# Patient Record
Sex: MALE | Race: BLACK OR AFRICAN AMERICAN | NOT HISPANIC OR LATINO | Employment: FULL TIME | URBAN - METROPOLITAN AREA
[De-identification: names, ages, dates, MRNs, and addresses within clinical notes are randomized per-mention and may not be internally consistent; named-entity substitution may affect disease eponyms.]

---

## 2019-07-28 ENCOUNTER — HOSPITAL ENCOUNTER (EMERGENCY)
Facility: HOSPITAL | Age: 24
Discharge: HOME | End: 2019-07-29
Attending: EMERGENCY MEDICINE

## 2019-07-28 DIAGNOSIS — J36 PERITONSILLAR ABSCESS: Primary | ICD-10-CM

## 2019-07-28 LAB
ALBUMIN SERPL BCP-MCNC: 4.5 G/DL (ref 3.5–5.2)
ALP SERPL-CCNC: 81 U/L (ref 55–135)
ALT SERPL W/O P-5'-P-CCNC: 24 U/L (ref 10–44)
ANION GAP SERPL CALC-SCNC: 12 MMOL/L (ref 8–16)
AST SERPL-CCNC: 14 U/L (ref 10–40)
BASOPHILS # BLD AUTO: 0.02 K/UL (ref 0–0.2)
BASOPHILS NFR BLD: 0.1 % (ref 0–1.9)
BILIRUB SERPL-MCNC: 1.1 MG/DL (ref 0.1–1)
BUN SERPL-MCNC: 10 MG/DL (ref 6–20)
CALCIUM SERPL-MCNC: 10.1 MG/DL (ref 8.7–10.5)
CHLORIDE SERPL-SCNC: 101 MMOL/L (ref 95–110)
CO2 SERPL-SCNC: 25 MMOL/L (ref 23–29)
CREAT SERPL-MCNC: 0.9 MG/DL (ref 0.5–1.4)
DEPRECATED S PYO AG THROAT QL EIA: NEGATIVE
DIFFERENTIAL METHOD: ABNORMAL
EOSINOPHIL # BLD AUTO: 0 K/UL (ref 0–0.5)
EOSINOPHIL NFR BLD: 0.2 % (ref 0–8)
ERYTHROCYTE [DISTWIDTH] IN BLOOD BY AUTOMATED COUNT: 13.4 % (ref 11.5–14.5)
EST. GFR  (AFRICAN AMERICAN): >60 ML/MIN/1.73 M^2
EST. GFR  (NON AFRICAN AMERICAN): >60 ML/MIN/1.73 M^2
GLUCOSE SERPL-MCNC: 114 MG/DL (ref 70–110)
HCT VFR BLD AUTO: 45 % (ref 40–54)
HETEROPH AB SERPL QL IA: NEGATIVE
HGB BLD-MCNC: 15 G/DL (ref 14–18)
HIV 1+2 AB+HIV1 P24 AG SERPL QL IA: NEGATIVE
LYMPHOCYTES # BLD AUTO: 2.3 K/UL (ref 1–4.8)
LYMPHOCYTES NFR BLD: 10.2 % (ref 18–48)
MCH RBC QN AUTO: 27.1 PG (ref 27–31)
MCHC RBC AUTO-ENTMCNC: 33.3 G/DL (ref 32–36)
MCV RBC AUTO: 81 FL (ref 82–98)
MONOCYTES # BLD AUTO: 2.4 K/UL (ref 0.3–1)
MONOCYTES NFR BLD: 10.7 % (ref 4–15)
NEUTROPHILS # BLD AUTO: 17.7 K/UL (ref 1.8–7.7)
NEUTROPHILS NFR BLD: 79.2 % (ref 38–73)
PLATELET # BLD AUTO: 285 K/UL (ref 150–350)
PMV BLD AUTO: 8.8 FL (ref 9.2–12.9)
POTASSIUM SERPL-SCNC: 3.6 MMOL/L (ref 3.5–5.1)
PROT SERPL-MCNC: 8.9 G/DL (ref 6–8.4)
RBC # BLD AUTO: 5.54 M/UL (ref 4.6–6.2)
SODIUM SERPL-SCNC: 138 MMOL/L (ref 136–145)
WBC # BLD AUTO: 22.45 K/UL (ref 3.9–12.7)

## 2019-07-28 PROCEDURE — 25000003 PHARM REV CODE 250: Performed by: EMERGENCY MEDICINE

## 2019-07-28 PROCEDURE — 80053 COMPREHEN METABOLIC PANEL: CPT

## 2019-07-28 PROCEDURE — 36415 COLL VENOUS BLD VENIPUNCTURE: CPT

## 2019-07-28 PROCEDURE — 42700 I&D ABSCESS PERITONSILLAR: CPT

## 2019-07-28 PROCEDURE — 87081 CULTURE SCREEN ONLY: CPT

## 2019-07-28 PROCEDURE — 96374 THER/PROPH/DIAG INJ IV PUSH: CPT | Mod: 59

## 2019-07-28 PROCEDURE — 25500020 PHARM REV CODE 255: Performed by: REGISTERED NURSE

## 2019-07-28 PROCEDURE — 96361 HYDRATE IV INFUSION ADD-ON: CPT | Mod: 59

## 2019-07-28 PROCEDURE — 99285 EMERGENCY DEPT VISIT HI MDM: CPT | Mod: 25

## 2019-07-28 PROCEDURE — 63600175 PHARM REV CODE 636 W HCPCS: Performed by: REGISTERED NURSE

## 2019-07-28 PROCEDURE — 86703 HIV-1/HIV-2 1 RESULT ANTBDY: CPT

## 2019-07-28 PROCEDURE — 87880 STREP A ASSAY W/OPTIC: CPT

## 2019-07-28 PROCEDURE — 86308 HETEROPHILE ANTIBODY SCREEN: CPT

## 2019-07-28 PROCEDURE — 85025 COMPLETE CBC W/AUTO DIFF WBC: CPT

## 2019-07-28 RX ORDER — DEXAMETHASONE SODIUM PHOSPHATE 4 MG/ML
8 INJECTION, SOLUTION INTRA-ARTICULAR; INTRALESIONAL; INTRAMUSCULAR; INTRAVENOUS; SOFT TISSUE
Status: COMPLETED | OUTPATIENT
Start: 2019-07-28 | End: 2019-07-28

## 2019-07-28 RX ORDER — ACETAMINOPHEN 500 MG
1000 TABLET ORAL
Status: COMPLETED | OUTPATIENT
Start: 2019-07-28 | End: 2019-07-28

## 2019-07-28 RX ORDER — LIDOCAINE HYDROCHLORIDE 10 MG/ML
10 INJECTION, SOLUTION EPIDURAL; INFILTRATION; INTRACAUDAL; PERINEURAL
Status: COMPLETED | OUTPATIENT
Start: 2019-07-28 | End: 2019-07-28

## 2019-07-28 RX ORDER — DEXAMETHASONE SODIUM PHOSPHATE 4 MG/ML
8 INJECTION, SOLUTION INTRA-ARTICULAR; INTRALESIONAL; INTRAMUSCULAR; INTRAVENOUS; SOFT TISSUE
Status: DISCONTINUED | OUTPATIENT
Start: 2019-07-28 | End: 2019-07-28

## 2019-07-28 RX ORDER — AMOXICILLIN AND CLAVULANATE POTASSIUM 875; 125 MG/1; MG/1
1 TABLET, FILM COATED ORAL EVERY 12 HOURS
Qty: 28 TABLET | Refills: 0 | Status: SHIPPED | OUTPATIENT
Start: 2019-07-28 | End: 2019-08-11

## 2019-07-28 RX ORDER — AMOXICILLIN AND CLAVULANATE POTASSIUM 875; 125 MG/1; MG/1
1 TABLET, FILM COATED ORAL
Status: COMPLETED | OUTPATIENT
Start: 2019-07-28 | End: 2019-07-28

## 2019-07-28 RX ADMIN — IOHEXOL 75 ML: 350 INJECTION, SOLUTION INTRAVENOUS at 09:07

## 2019-07-28 RX ADMIN — SODIUM CHLORIDE 1000 ML: 0.9 INJECTION, SOLUTION INTRAVENOUS at 08:07

## 2019-07-28 RX ADMIN — LIDOCAINE HYDROCHLORIDE 100 MG: 10 INJECTION, SOLUTION EPIDURAL; INFILTRATION; INTRACAUDAL; PERINEURAL at 11:07

## 2019-07-28 RX ADMIN — DEXAMETHASONE SODIUM PHOSPHATE 8 MG: 4 INJECTION, SOLUTION INTRA-ARTICULAR; INTRALESIONAL; INTRAMUSCULAR; INTRAVENOUS; SOFT TISSUE at 08:07

## 2019-07-28 RX ADMIN — ACETAMINOPHEN 1000 MG: 500 TABLET ORAL at 11:07

## 2019-07-28 RX ADMIN — AMOXICILLIN AND CLAVULANATE POTASSIUM 1 TABLET: 875; 125 TABLET, FILM COATED ORAL at 11:07

## 2019-07-29 VITALS
RESPIRATION RATE: 19 BRPM | DIASTOLIC BLOOD PRESSURE: 77 MMHG | WEIGHT: 175.5 LBS | BODY MASS INDEX: 29.24 KG/M2 | HEIGHT: 65 IN | TEMPERATURE: 98 F | SYSTOLIC BLOOD PRESSURE: 145 MMHG | HEART RATE: 84 BPM | OXYGEN SATURATION: 99 %

## 2019-07-29 NOTE — ED PROVIDER NOTES
SCRIBE #1 NOTE: IGus Zee, am scribing for, and in the presence of, LJ Hager Jr. . I have scribed the HPI, ROS, and PEx.    SCRIBE #2 NOTE: I, Chris Ramírez/Naya Cesar, am scribing for, and in the presence of,  Arnav Mccarthy MD. I have scribed the remaining portions of the note not scribed by Scribe #1.      History     Chief Complaint   Patient presents with    Sore Throat     states sore throat for a week with difficulty swallowing past 4 days. Denies fever. Able to swallow with difficulty, no airway distress noted.      Review of patient's allergies indicates:  No Known Allergies      History of Present Illness           7/28/2019, 7:48 PM  History obtained from the patient      History of Present Illness: Wilfred Beach is a 23 y.o. male patient who presents to the Emergency Department for evaluation of sore throat which onset gradually last weekend. Pt states his sxs had improved during the week but recently worsened. Symptoms are worsening and moderate in severity. No mitigating or exacerbating factors reported. Associated sxs include difficulty swallowing and hoarseness. Patient denies any fever, chills, rhinorrhea, congestion, ear pain, and all other sxs at this time. No further complaints or concerns at this time.         Arrival mode: Personal vehicle     PCP: Primary Doctor No        Past Medical History:  History reviewed. No pertinent past medical history.    Past Surgical History:  History reviewed. No pertinent surgical history.      Family History:  History reviewed. No pertinent family history.    Social History:  Social History     Tobacco Use    Smoking status: Current Some Day Smoker     Types: Cigarettes    Smokeless tobacco: Never Used   Substance and Sexual Activity    Alcohol use: Never     Frequency: Never    Drug use: Never    Sexual activity: Unknown        Review of Systems     Review of Systems   Constitutional: Negative for fever.   HENT:  Positive for sore throat, trouble swallowing and voice change. Negative for congestion, ear pain and rhinorrhea.    Respiratory: Negative for shortness of breath.    Cardiovascular: Negative for chest pain.   Gastrointestinal: Negative for nausea.   Genitourinary: Negative for dysuria.   Musculoskeletal: Negative for back pain.   Skin: Negative for rash.   Neurological: Negative for weakness.   Hematological: Does not bruise/bleed easily.   All other systems reviewed and are negative.       Physical Exam     Initial Vitals [07/28/19 1939]   BP Pulse Resp Temp SpO2   (!) 155/77 86 18 98.4 °F (36.9 °C) 95 %      MAP       --          Physical Exam  Nursing Notes and Vital Signs Reviewed.  Constitutional: Patient is in no apparent distress. Well-developed and well-nourished.  Head: Atraumatic. Normocephalic.  Eyes: PERRL. EOM intact. Conjunctivae are not pale. No scleral icterus.  Nose: Patent nares. Turbinates are normal. No drainage.   Throat: Moist mucous membranes. Posterior oropharynx is symmetric with erythema. +3 right tonsillar edema. +1 left tonsillar edema. Tonsillar exudate is not present. No trismus. Normal handling of secretions. No stridor..    Neck: Supple. Full ROM. No lymphadenopathy.  Cardiovascular: Regular rate. Regular rhythm. No murmurs, rubs, or gallops. Distal pulses are 2+ and symmetric.  Pulmonary/Chest: No respiratory distress. Clear to auscultation bilaterally. No wheezing or rales.  Abdominal: Soft and non-distended.  There is no tenderness.  No rebound, guarding, or rigidity. Good bowel sounds.  Genitourinary: No CVA tenderness  Musculoskeletal: Moves all extremities. No obvious deformities. No edema.  Skin: Warm and dry.  Neurological:  Alert, awake, and appropriate.  Normal speech.  No acute focal neurological deficits are appreciated.  Psychiatric: Normal affect. Good eye contact. Appropriate in content.     ED Course   I & D - Incision and Drainage  Date/Time: 7/28/2019 11:28  "PM  Performed by: Tre Mccarthy MD  Authorized by: Tre Mccarthy MD   Consent Done: Yes  Consent: Verbal consent obtained.  Risks and benefits: risks, benefits and alternatives were discussed  Consent given by: patient  Patient understanding: patient states understanding of the procedure being performed  Patient identity confirmed: name and verbally with patient  Type: abscess  Body area: mouth  Location details: peritonsillar  Anesthesia: local infiltration    Anesthesia:  Local Anesthetic: lidocaine 1% without epinephrine  Anesthetic total: 1 mL  Patient sedated: no  Scalpel size: 11  Incision type: single straight  Complexity: simple  Drainage: bloody and  purulent  Drainage amount: scant  Wound treatment: incision,  drainage and  expression of material  Packing material: none  Complications: No  Patient tolerance: Patient tolerated the procedure well with no immediate complications        ED Vital Signs:  Vitals:    07/28/19 1939   BP: (!) 155/77   Pulse: 86   Resp: 18   Temp: 98.4 °F (36.9 °C)   TempSrc: Oral   SpO2: 95%   Weight: 79.6 kg (175 lb 7.8 oz)   Height: 5' 5" (1.651 m)       Abnormal Lab Results:  Labs Reviewed   CBC W/ AUTO DIFFERENTIAL - Abnormal; Notable for the following components:       Result Value    WBC 22.45 (*)     Mean Corpuscular Volume 81 (*)     MPV 8.8 (*)     Gran # (ANC) 17.7 (*)     Mono # 2.4 (*)     Gran% 79.2 (*)     Lymph% 10.2 (*)     All other components within normal limits   COMPREHENSIVE METABOLIC PANEL - Abnormal; Notable for the following components:    Glucose 114 (*)     Total Protein 8.9 (*)     Total Bilirubin 1.1 (*)     All other components within normal limits   THROAT SCREEN, RAPID   CULTURE, STREP A,  THROAT   HIV 1 / 2 ANTIBODY   HETEROPHILE AB SCREEN        All Lab Results:  Results for orders placed or performed during the hospital encounter of 07/28/19   Rapid strep screen   Result Value Ref Range    Rapid Strep A Screen Negative Negative   HIV " 1/2 Ag/Ab (4th Gen)   Result Value Ref Range    HIV 1/2 Ag/Ab Negative Negative   CBC auto differential   Result Value Ref Range    WBC 22.45 (H) 3.90 - 12.70 K/uL    RBC 5.54 4.60 - 6.20 M/uL    Hemoglobin 15.0 14.0 - 18.0 g/dL    Hematocrit 45.0 40.0 - 54.0 %    Mean Corpuscular Volume 81 (L) 82 - 98 fL    Mean Corpuscular Hemoglobin 27.1 27.0 - 31.0 pg    Mean Corpuscular Hemoglobin Conc 33.3 32.0 - 36.0 g/dL    RDW 13.4 11.5 - 14.5 %    Platelets 285 150 - 350 K/uL    MPV 8.8 (L) 9.2 - 12.9 fL    Gran # (ANC) 17.7 (H) 1.8 - 7.7 K/uL    Lymph # 2.3 1.0 - 4.8 K/uL    Mono # 2.4 (H) 0.3 - 1.0 K/uL    Eos # 0.0 0.0 - 0.5 K/uL    Baso # 0.02 0.00 - 0.20 K/uL    Gran% 79.2 (H) 38.0 - 73.0 %    Lymph% 10.2 (L) 18.0 - 48.0 %    Mono% 10.7 4.0 - 15.0 %    Eosinophil% 0.2 0.0 - 8.0 %    Basophil% 0.1 0.0 - 1.9 %    Differential Method Automated    Comprehensive metabolic panel   Result Value Ref Range    Sodium 138 136 - 145 mmol/L    Potassium 3.6 3.5 - 5.1 mmol/L    Chloride 101 95 - 110 mmol/L    CO2 25 23 - 29 mmol/L    Glucose 114 (H) 70 - 110 mg/dL    BUN, Bld 10 6 - 20 mg/dL    Creatinine 0.9 0.5 - 1.4 mg/dL    Calcium 10.1 8.7 - 10.5 mg/dL    Total Protein 8.9 (H) 6.0 - 8.4 g/dL    Albumin 4.5 3.5 - 5.2 g/dL    Total Bilirubin 1.1 (H) 0.1 - 1.0 mg/dL    Alkaline Phosphatase 81 55 - 135 U/L    AST 14 10 - 40 U/L    ALT 24 10 - 44 U/L    Anion Gap 12 8 - 16 mmol/L    eGFR if African American >60 >60 mL/min/1.73 m^2    eGFR if non African American >60 >60 mL/min/1.73 m^2   Heterophile Ab Screen   Result Value Ref Range    Monospot Negative Negative         Imaging Results:  Imaging Results          CT Soft Tissue Neck With Contrast (Final result)  Result time 07/28/19 21:49:56    Final result by David Pete Jr., MD (07/28/19 21:49:56)                 Impression:      1. Right peritonsillar abscess that moderately narrows the airway of the nasopharynx.  2. There is also prominent left palatine tonsillar tissue  without abscess in this region.  Prominent adenoidal tissue as well.  All CT scans at this facility use dose modulation, iterative reconstruction, and/or weight base dosing when appropriate to reduce radiation dose to as low as reasonably achievable.      Electronically signed by: David Pete Jr., MD  Date:    07/28/2019  Time:    21:49             Narrative:    EXAMINATION:  CT SOFT TISSUE NECK WITH CONTRAST    CLINICAL HISTORY:  Tonsil/adenoid disorder;    TECHNIQUE:  Contiguous axial images were obtained from the aortic arch through the vertex with intravenous contrast in venous phase of imaging.    COMPARISON:  None    FINDINGS:  The parotid and submandibular glands are normal. The thyroid gland is normal in CT appearance.    There is a peripherally enhancing fluid collection within the right palatine tonsil and mucosal pharyngeal space consistent with abscess.  The peripherally enhancing component measures 16 mm craniocaudal by 13 mm transverse by 9 mm AP.  It moderately narrows the airway of the nasopharynx and upper hypopharynx.  The left palatine tonsil is prominent without abscess.  There is also prominent adenoidal tissue bilaterally.  The larynx is normal. The cervical esophagus is unremarkable.    Slightly prominent right jugulodigastric node which is almost certainly reactive in nature.  No cystic necrotic or enhancing lymph nodes.    The carotid arteries and jugular veins are patent.    The cervical spine is normal in appearance. Imaged intracranial structures are unremarkable. Orbits are normal. Paranasal sinuses and mastoid air cells are normal. Visualized lung apices and mediastinum are normal.                                          The Emergency Provider reviewed the vital signs and test results, which are outlined above.     ED Discussion     9:52 PM: Lewis Kahn NP transfers care of pt to Dr. Mccarthy pending lab results.    11:13 PM: Dr. Mccarthy evaluated pt. Pt is resting comfortably  and is in no acute distress.  D/w pt all pertinent results. D/w pt any concerns expressed at this time. Answered all questions. Pt expresses understanding at this time. Discussed with patient risks and benefits of I&D, patient agreed to proceed with I&D.    11:40 PM: Reassessed pt at this time. Patient states sxs have improved at this time. On re-exam, uvula returned towards midline, decreased swelling of tonsil. Discussed with pt all pertinent ED information. Discussed pt dx and plan of tx. Gave pt all f/u and return to the ED instructions. All questions and concerns were addressed at this time. Pt expresses understanding of information and instructions, and is comfortable with plan to discharge. Pt is stable for discharge.    I discussed wound care precautions with patient and/or family/caretaker; specifically that all wounds have risk of infection despite efforts to cleanse and debride the wound; and there is a risk of an occult foreign body (and thus increased risk of infection) despite a negative examination.  I discussed with patient need to return for any signs of infection, specifically redness, increased pain, fever, drainage of pus, or any concern, immediately.    ED Medication(s):  Medications   lidocaine (PF) 10 mg/ml (1%) injection 100 mg (has no administration in time range)   amoxicillin-clavulanate 875-125mg per tablet 1 tablet (has no administration in time range)   acetaminophen tablet 1,000 mg (has no administration in time range)   sodium chloride 0.9% bolus 1,000 mL (0 mLs Intravenous Stopped 7/28/19 2140)   dexamethasone injection 8 mg (8 mg Intravenous Given 7/28/19 2028)   iohexol (OMNIPAQUE 350) injection 100 mL (75 mLs Intravenous Given 7/28/19 2128)       New Prescriptions    AMOXICILLIN-CLAVULANATE 875-125MG (AUGMENTIN) 875-125 MG PER TABLET    Take 1 tablet by mouth every 12 (twelve) hours. for 14 days       Follow-up Information     Primary Doctor No In 5 days.           [facility]  [place] [facility] [place]    Specialty:  Emergency Medicine  Why:  As needed, If symptoms worsen  Contact information:  [address]  [facility] [address] [phone]                       Medical Decision Making:   Clinical Tests:   Lab Tests: Ordered and Reviewed  Radiological Study: Ordered and Reviewed               Scribe Attestation:   Scribe #1: I performed the above scribed service and the documentation accurately describes the services I performed. I attest to the accuracy of the note.     Attending:   Physician Attestation Statement for Scribe #1: I, Lewis Kahn Jr., ALISP , personally performed the services described in this documentation, as scribed by Gus Koch, in my presence, and it is both accurate and complete.       Scribe Attestation:   Scribe #2: I performed the above scribed service and the documentation accurately describes the services I performed. I attest to the accuracy of the note.    Attending Attestation:           Physician Attestation for Scribe:    Physician Attestation Statement for Scribe #2: I, Arnav Mccarthy MD, reviewed documentation, as scribed by Chris Ramírez/Naya Cesar in my presence, and it is both accurate and complete. I also acknowledge and confirm the content of the note done by Colinibe #1.           Clinical Impression       ICD-10-CM ICD-9-CM   1. Peritonsillar abscess J36 475       Disposition:   Disposition: Discharged  Condition: Stable         Tre Mccarthy MD  07/29/19 0808

## 2019-07-31 LAB — BACTERIA THROAT CULT: NORMAL

## 2021-05-30 ENCOUNTER — HOSPITAL ENCOUNTER (EMERGENCY)
Facility: HOSPITAL | Age: 26
Discharge: ANOTHER HOSPITAL | End: 2021-05-31
Attending: EMERGENCY MEDICINE
Payer: MEDICAID

## 2021-05-30 DIAGNOSIS — S00.83XA FACIAL CONTUSION, INITIAL ENCOUNTER: ICD-10-CM

## 2021-05-30 DIAGNOSIS — S02.609B OPEN FRACTURE OF MANDIBLE, UNSPECIFIED LATERALITY, UNSPECIFIED MANDIBULAR SITE, INITIAL ENCOUNTER: Primary | ICD-10-CM
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
## 2021-05-30 LAB
CTP QC/QA: YES
CTP QC/QA: YES
POC MOLECULAR INFLUENZA A AGN: NEGATIVE
POC MOLECULAR INFLUENZA B AGN: NEGATIVE
SARS-COV-2 RDRP RESP QL NAA+PROBE: NEGATIVE

## 2021-05-30 PROCEDURE — 90471 IMMUNIZATION ADMIN: CPT | Performed by: EMERGENCY MEDICINE

## 2021-05-30 PROCEDURE — U0002 COVID-19 LAB TEST NON-CDC: HCPCS | Performed by: EMERGENCY MEDICINE

## 2021-05-30 PROCEDURE — 25000003 PHARM REV CODE 250: Performed by: EMERGENCY MEDICINE

## 2021-05-30 PROCEDURE — 90715 TDAP VACCINE 7 YRS/> IM: CPT | Performed by: EMERGENCY MEDICINE

## 2021-05-30 PROCEDURE — 96375 TX/PRO/DX INJ NEW DRUG ADDON: CPT

## 2021-05-30 PROCEDURE — 96365 THER/PROPH/DIAG IV INF INIT: CPT

## 2021-05-30 PROCEDURE — 99285 EMERGENCY DEPT VISIT HI MDM: CPT | Mod: 25

## 2021-05-30 PROCEDURE — 63600175 PHARM REV CODE 636 W HCPCS: Performed by: EMERGENCY MEDICINE

## 2021-05-30 RX ORDER — MORPHINE SULFATE 2 MG/ML
2 INJECTION, SOLUTION INTRAMUSCULAR; INTRAVENOUS
Status: COMPLETED | OUTPATIENT
Start: 2021-05-30 | End: 2021-05-30

## 2021-05-30 RX ORDER — CLINDAMYCIN PHOSPHATE 900 MG/50ML
900 INJECTION, SOLUTION INTRAVENOUS
Status: COMPLETED | OUTPATIENT
Start: 2021-05-30 | End: 2021-05-30

## 2021-05-30 RX ORDER — KETOROLAC TROMETHAMINE 10 MG/1
10 TABLET, FILM COATED ORAL
Status: COMPLETED | OUTPATIENT
Start: 2021-05-30 | End: 2021-05-30

## 2021-05-30 RX ADMIN — MORPHINE SULFATE 2 MG: 2 INJECTION, SOLUTION INTRAMUSCULAR; INTRAVENOUS at 11:05

## 2021-05-30 RX ADMIN — KETOROLAC TROMETHAMINE 10 MG: 10 TABLET, FILM COATED ORAL at 09:05

## 2021-05-30 RX ADMIN — TETANUS TOXOID, REDUCED DIPHTHERIA TOXOID AND ACELLULAR PERTUSSIS VACCINE, ADSORBED 0.5 ML: 5; 2.5; 8; 8; 2.5 SUSPENSION INTRAMUSCULAR at 10:05

## 2021-05-30 RX ADMIN — CLINDAMYCIN IN 5 PERCENT DEXTROSE 900 MG: 18 INJECTION, SOLUTION INTRAVENOUS at 10:05

## 2021-05-31 ENCOUNTER — ANESTHESIA (OUTPATIENT)
Dept: SURGERY | Facility: OTHER | Age: 26
DRG: 142 | End: 2021-05-31
Payer: MEDICAID

## 2021-05-31 ENCOUNTER — ANESTHESIA EVENT (OUTPATIENT)
Dept: SURGERY | Facility: OTHER | Age: 26
DRG: 142 | End: 2021-05-31
Payer: MEDICAID

## 2021-05-31 ENCOUNTER — HOSPITAL ENCOUNTER (OUTPATIENT)
Facility: OTHER | Age: 26
Discharge: HOME | DRG: 142 | End: 2021-05-31
Attending: EMERGENCY MEDICINE | Admitting: PLASTIC SURGERY
Payer: MEDICAID

## 2021-05-31 VITALS
HEIGHT: 65 IN | OXYGEN SATURATION: 96 % | HEART RATE: 81 BPM | DIASTOLIC BLOOD PRESSURE: 67 MMHG | RESPIRATION RATE: 18 BRPM | SYSTOLIC BLOOD PRESSURE: 117 MMHG | BODY MASS INDEX: 30.95 KG/M2 | WEIGHT: 185.75 LBS | TEMPERATURE: 99 F

## 2021-05-31 VITALS
SYSTOLIC BLOOD PRESSURE: 147 MMHG | BODY MASS INDEX: 31.34 KG/M2 | DIASTOLIC BLOOD PRESSURE: 82 MMHG | TEMPERATURE: 98 F | WEIGHT: 195 LBS | RESPIRATION RATE: 16 BRPM | OXYGEN SATURATION: 96 % | HEIGHT: 66 IN | HEART RATE: 74 BPM

## 2021-05-31 DIAGNOSIS — S02.609A MANDIBLE FRACTURE: ICD-10-CM

## 2021-05-31 DIAGNOSIS — S02.609A CLOSED FRACTURE OF MANDIBLE, UNSPECIFIED LATERALITY, UNSPECIFIED MANDIBULAR SITE, INITIAL ENCOUNTER: Primary | ICD-10-CM

## 2021-05-31 PROCEDURE — 00190 ANES PX FACIAL B1/SKULL NOS: CPT | Performed by: PLASTIC SURGERY

## 2021-05-31 PROCEDURE — 71000033 HC RECOVERY, INTIAL HOUR: Performed by: PLASTIC SURGERY

## 2021-05-31 PROCEDURE — 63600175 PHARM REV CODE 636 W HCPCS: Performed by: NURSE ANESTHETIST, CERTIFIED REGISTERED

## 2021-05-31 PROCEDURE — 25000003 PHARM REV CODE 250: Performed by: STUDENT IN AN ORGANIZED HEALTH CARE EDUCATION/TRAINING PROGRAM

## 2021-05-31 PROCEDURE — 37000008 HC ANESTHESIA 1ST 15 MINUTES: Performed by: PLASTIC SURGERY

## 2021-05-31 PROCEDURE — 37000009 HC ANESTHESIA EA ADD 15 MINS: Performed by: PLASTIC SURGERY

## 2021-05-31 PROCEDURE — 25000003 PHARM REV CODE 250: Performed by: PLASTIC SURGERY

## 2021-05-31 PROCEDURE — G0378 HOSPITAL OBSERVATION PER HR: HCPCS

## 2021-05-31 PROCEDURE — 36000710: Performed by: PLASTIC SURGERY

## 2021-05-31 PROCEDURE — 36000711: Performed by: PLASTIC SURGERY

## 2021-05-31 PROCEDURE — 12000002 HC ACUTE/MED SURGE SEMI-PRIVATE ROOM

## 2021-05-31 PROCEDURE — 71000015 HC POSTOP RECOV 1ST HR: Performed by: PLASTIC SURGERY

## 2021-05-31 PROCEDURE — 99285 EMERGENCY DEPT VISIT HI MDM: CPT | Mod: 25

## 2021-05-31 PROCEDURE — 71000016 HC POSTOP RECOV ADDL HR: Performed by: PLASTIC SURGERY

## 2021-05-31 PROCEDURE — 25000003 PHARM REV CODE 250: Performed by: NURSE ANESTHETIST, CERTIFIED REGISTERED

## 2021-05-31 PROCEDURE — 63600175 PHARM REV CODE 636 W HCPCS: Performed by: EMERGENCY MEDICINE

## 2021-05-31 PROCEDURE — C1713 ANCHOR/SCREW BN/BN,TIS/BN: HCPCS | Performed by: PLASTIC SURGERY

## 2021-05-31 PROCEDURE — 63600175 PHARM REV CODE 636 W HCPCS: Performed by: ANESTHESIOLOGY

## 2021-05-31 PROCEDURE — 71000039 HC RECOVERY, EACH ADD'L HOUR: Performed by: PLASTIC SURGERY

## 2021-05-31 PROCEDURE — 96376 TX/PRO/DX INJ SAME DRUG ADON: CPT

## 2021-05-31 DEVICE — PLATE BONE MAX 9 HOLE SMALL 2M: Type: IMPLANTABLE DEVICE | Site: FACE | Status: FUNCTIONAL

## 2021-05-31 DEVICE — SCREW BONE MAX 2X6MM: Type: IMPLANTABLE DEVICE | Site: FACE | Status: FUNCTIONAL

## 2021-05-31 RX ORDER — MEPERIDINE HYDROCHLORIDE 25 MG/ML
12.5 INJECTION INTRAMUSCULAR; INTRAVENOUS; SUBCUTANEOUS ONCE AS NEEDED
Status: DISCONTINUED | OUTPATIENT
Start: 2021-05-31 | End: 2021-06-01 | Stop reason: HOSPADM

## 2021-05-31 RX ORDER — LIDOCAINE HYDROCHLORIDE AND EPINEPHRINE 10; 10 MG/ML; UG/ML
INJECTION, SOLUTION INFILTRATION; PERINEURAL
Status: DISCONTINUED | OUTPATIENT
Start: 2021-05-31 | End: 2021-05-31 | Stop reason: HOSPADM

## 2021-05-31 RX ORDER — FENTANYL CITRATE 50 UG/ML
INJECTION, SOLUTION INTRAMUSCULAR; INTRAVENOUS
Status: DISCONTINUED | OUTPATIENT
Start: 2021-05-31 | End: 2021-05-31

## 2021-05-31 RX ORDER — CHLORHEXIDINE GLUCONATE ORAL RINSE 1.2 MG/ML
15 SOLUTION DENTAL 4 TIMES DAILY
Status: DISCONTINUED | OUTPATIENT
Start: 2021-05-31 | End: 2021-06-01 | Stop reason: SDUPTHER

## 2021-05-31 RX ORDER — HYDROCODONE BITARTRATE AND ACETAMINOPHEN 7.5; 325 MG/15ML; MG/15ML
15 SOLUTION ORAL EVERY 4 HOURS PRN
Qty: 473 ML | Refills: 0 | Status: SHIPPED | OUTPATIENT
Start: 2021-05-31 | End: 2021-06-06

## 2021-05-31 RX ORDER — ONDANSETRON HYDROCHLORIDE 4 MG/5ML
8 SOLUTION ORAL 2 TIMES DAILY PRN
Qty: 50 ML | Refills: 0 | Status: SHIPPED | OUTPATIENT
Start: 2021-05-31 | End: 2021-06-03

## 2021-05-31 RX ORDER — PROPOFOL 10 MG/ML
VIAL (ML) INTRAVENOUS
Status: DISCONTINUED | OUTPATIENT
Start: 2021-05-31 | End: 2021-05-31

## 2021-05-31 RX ORDER — OXYMETAZOLINE HCL 0.05 %
SPRAY, NON-AEROSOL (ML) NASAL
Status: DISCONTINUED | OUTPATIENT
Start: 2021-05-31 | End: 2021-05-31

## 2021-05-31 RX ORDER — MIDAZOLAM HYDROCHLORIDE 1 MG/ML
INJECTION INTRAMUSCULAR; INTRAVENOUS
Status: DISCONTINUED | OUTPATIENT
Start: 2021-05-31 | End: 2021-05-31

## 2021-05-31 RX ORDER — SODIUM CHLORIDE 0.9 % (FLUSH) 0.9 %
3 SYRINGE (ML) INJECTION
Status: DISCONTINUED | OUTPATIENT
Start: 2021-05-31 | End: 2021-06-06 | Stop reason: HOSPADM

## 2021-05-31 RX ORDER — HYDROMORPHONE HYDROCHLORIDE 2 MG/ML
INJECTION, SOLUTION INTRAMUSCULAR; INTRAVENOUS; SUBCUTANEOUS
Status: DISCONTINUED | OUTPATIENT
Start: 2021-05-31 | End: 2021-05-31

## 2021-05-31 RX ORDER — MORPHINE SULFATE 2 MG/ML
3 INJECTION, SOLUTION INTRAMUSCULAR; INTRAVENOUS EVERY 4 HOURS PRN
Status: DISCONTINUED | OUTPATIENT
Start: 2021-05-31 | End: 2021-06-06 | Stop reason: HOSPADM

## 2021-05-31 RX ORDER — DEXAMETHASONE SODIUM PHOSPHATE 4 MG/ML
INJECTION, SOLUTION INTRA-ARTICULAR; INTRALESIONAL; INTRAMUSCULAR; INTRAVENOUS; SOFT TISSUE
Status: DISCONTINUED | OUTPATIENT
Start: 2021-05-31 | End: 2021-05-31

## 2021-05-31 RX ORDER — MORPHINE SULFATE 4 MG/ML
4 INJECTION, SOLUTION INTRAMUSCULAR; INTRAVENOUS
Status: COMPLETED | OUTPATIENT
Start: 2021-05-31 | End: 2021-05-31

## 2021-05-31 RX ORDER — CHLORHEXIDINE GLUCONATE ORAL RINSE 1.2 MG/ML
15 SOLUTION DENTAL 2 TIMES DAILY
Qty: 420 ML | Refills: 0 | Status: SHIPPED | OUTPATIENT
Start: 2021-05-31 | End: 2021-06-14

## 2021-05-31 RX ORDER — ROCURONIUM BROMIDE 10 MG/ML
INJECTION, SOLUTION INTRAVENOUS
Status: DISCONTINUED | OUTPATIENT
Start: 2021-05-31 | End: 2021-05-31

## 2021-05-31 RX ORDER — HYDROMORPHONE HYDROCHLORIDE 2 MG/ML
0.4 INJECTION, SOLUTION INTRAMUSCULAR; INTRAVENOUS; SUBCUTANEOUS EVERY 5 MIN PRN
Status: DISCONTINUED | OUTPATIENT
Start: 2021-05-31 | End: 2021-06-06 | Stop reason: HOSPADM

## 2021-05-31 RX ORDER — OXYCODONE HYDROCHLORIDE 5 MG/1
5 TABLET ORAL
Status: DISCONTINUED | OUTPATIENT
Start: 2021-05-31 | End: 2021-06-06 | Stop reason: HOSPADM

## 2021-05-31 RX ORDER — CHLORHEXIDINE GLUCONATE ORAL RINSE 1.2 MG/ML
15 SOLUTION DENTAL 2 TIMES DAILY
Status: DISCONTINUED | OUTPATIENT
Start: 2021-05-31 | End: 2021-06-06 | Stop reason: HOSPADM

## 2021-05-31 RX ORDER — AMOXICILLIN AND CLAVULANATE POTASSIUM 400; 57 MG/5ML; MG/5ML
400 POWDER, FOR SUSPENSION ORAL EVERY 12 HOURS
Qty: 100 ML | Refills: 0 | Status: SHIPPED | OUTPATIENT
Start: 2021-05-31 | End: 2021-06-10

## 2021-05-31 RX ORDER — HYDROCODONE BITARTRATE AND ACETAMINOPHEN 7.5; 325 MG/15ML; MG/15ML
20 SOLUTION ORAL EVERY 4 HOURS PRN
Status: DISCONTINUED | OUTPATIENT
Start: 2021-05-31 | End: 2021-06-06 | Stop reason: HOSPADM

## 2021-05-31 RX ORDER — ONDANSETRON 2 MG/ML
INJECTION INTRAMUSCULAR; INTRAVENOUS
Status: DISCONTINUED | OUTPATIENT
Start: 2021-05-31 | End: 2021-05-31

## 2021-05-31 RX ORDER — LIDOCAINE HCL/PF 100 MG/5ML
SYRINGE (ML) INTRAVENOUS
Status: DISCONTINUED | OUTPATIENT
Start: 2021-05-31 | End: 2021-05-31

## 2021-05-31 RX ORDER — ACETAMINOPHEN 10 MG/ML
INJECTION, SOLUTION INTRAVENOUS
Status: DISCONTINUED | OUTPATIENT
Start: 2021-05-31 | End: 2021-05-31

## 2021-05-31 RX ORDER — KETAMINE HCL IN 0.9 % NACL 50 MG/5 ML
SYRINGE (ML) INTRAVENOUS
Status: DISCONTINUED | OUTPATIENT
Start: 2021-05-31 | End: 2021-05-31

## 2021-05-31 RX ORDER — ONDANSETRON 2 MG/ML
4 INJECTION INTRAMUSCULAR; INTRAVENOUS DAILY PRN
Status: DISCONTINUED | OUTPATIENT
Start: 2021-05-31 | End: 2021-06-06 | Stop reason: HOSPADM

## 2021-05-31 RX ORDER — HYDROCODONE BITARTRATE AND ACETAMINOPHEN 7.5; 325 MG/15ML; MG/15ML
15 SOLUTION ORAL EVERY 4 HOURS PRN
Status: DISCONTINUED | OUTPATIENT
Start: 2021-05-31 | End: 2021-06-06 | Stop reason: HOSPADM

## 2021-05-31 RX ADMIN — FENTANYL CITRATE 100 MCG: 50 INJECTION, SOLUTION INTRAMUSCULAR; INTRAVENOUS at 02:05

## 2021-05-31 RX ADMIN — PROPOFOL 200 MG: 10 INJECTION, EMULSION INTRAVENOUS at 02:05

## 2021-05-31 RX ADMIN — CARBOXYMETHYLCELLULOSE SODIUM 2 DROP: 2.5 SOLUTION/ DROPS OPHTHALMIC at 02:05

## 2021-05-31 RX ADMIN — Medication 30 MG: at 02:05

## 2021-05-31 RX ADMIN — ACETAMINOPHEN 1000 MG: 10 INJECTION, SOLUTION INTRAVENOUS at 02:05

## 2021-05-31 RX ADMIN — ONDANSETRON HYDROCHLORIDE 4 MG: 2 INJECTION INTRAMUSCULAR; INTRAVENOUS at 03:05

## 2021-05-31 RX ADMIN — DEXAMETHASONE SODIUM PHOSPHATE 12 MG: 4 INJECTION, SOLUTION INTRAMUSCULAR; INTRAVENOUS at 02:05

## 2021-05-31 RX ADMIN — LIDOCAINE HYDROCHLORIDE 80 MG: 20 INJECTION, SOLUTION INTRAVENOUS at 02:05

## 2021-05-31 RX ADMIN — CHLORHEXIDINE GLUCONATE 0.12% ORAL RINSE 15 ML: 1.2 LIQUID ORAL at 12:05

## 2021-05-31 RX ADMIN — MORPHINE SULFATE 4 MG: 4 INJECTION, SOLUTION INTRAMUSCULAR; INTRAVENOUS at 07:05

## 2021-05-31 RX ADMIN — FENTANYL CITRATE 50 MCG: 50 INJECTION, SOLUTION INTRAMUSCULAR; INTRAVENOUS at 02:05

## 2021-05-31 RX ADMIN — HYDROMORPHONE HYDROCHLORIDE 0.5 MG: 2 INJECTION, SOLUTION INTRAMUSCULAR; INTRAVENOUS; SUBCUTANEOUS at 03:05

## 2021-05-31 RX ADMIN — Medication 2 SPRAY: at 02:05

## 2021-05-31 RX ADMIN — FENTANYL CITRATE 50 MCG: 50 INJECTION, SOLUTION INTRAMUSCULAR; INTRAVENOUS at 03:05

## 2021-05-31 RX ADMIN — GLYCOPYRROLATE 0.1 MCG: 0.2 INJECTION, SOLUTION INTRAMUSCULAR; INTRAVITREAL at 03:05

## 2021-05-31 RX ADMIN — MIDAZOLAM HYDROCHLORIDE 2 MG: 1 INJECTION, SOLUTION INTRAMUSCULAR; INTRAVENOUS at 02:05

## 2021-05-31 RX ADMIN — ONDANSETRON 4 MG: 2 INJECTION INTRAMUSCULAR; INTRAVENOUS at 04:05

## 2021-05-31 RX ADMIN — ESMOLOL HYDROCHLORIDE 20 MG: 10 INJECTION INTRAVENOUS at 03:05

## 2021-05-31 RX ADMIN — SUGAMMADEX 200 MG: 100 INJECTION, SOLUTION INTRAVENOUS at 03:05

## 2021-05-31 RX ADMIN — ROCURONIUM BROMIDE 50 MG: 10 INJECTION, SOLUTION INTRAVENOUS at 02:05

## 2021-05-31 RX ADMIN — HYDROCODONE BITARTRATE AND ACETAMINOPHEN 15 ML: 7.5; 325 SOLUTION ORAL at 04:05

## 2021-06-01 PROCEDURE — 12000002 HC ACUTE/MED SURGE SEMI-PRIVATE ROOM

## 2021-06-02 ENCOUNTER — PATIENT MESSAGE (OUTPATIENT)
Dept: PLASTIC SURGERY | Facility: CLINIC | Age: 26
End: 2021-06-02

## 2021-06-02 PROCEDURE — 12000002 HC ACUTE/MED SURGE SEMI-PRIVATE ROOM

## 2021-06-03 PROCEDURE — 12000002 HC ACUTE/MED SURGE SEMI-PRIVATE ROOM

## 2021-06-03 PROCEDURE — 25000003 PHARM REV CODE 250

## 2021-06-04 PROCEDURE — 12000002 HC ACUTE/MED SURGE SEMI-PRIVATE ROOM

## 2021-06-05 PROCEDURE — 12000002 HC ACUTE/MED SURGE SEMI-PRIVATE ROOM

## 2024-06-01 ENCOUNTER — HOSPITAL ENCOUNTER (INPATIENT)
Facility: HOSPITAL | Age: 29
LOS: 5 days | Discharge: HOME | DRG: 322 | End: 2024-06-06
Attending: EMERGENCY MEDICINE | Admitting: INTERNAL MEDICINE
Payer: MEDICAID

## 2024-06-01 DIAGNOSIS — I21.4 NSTEMI (NON-ST ELEVATED MYOCARDIAL INFARCTION): ICD-10-CM

## 2024-06-01 DIAGNOSIS — I21.3 STEMI (ST ELEVATION MYOCARDIAL INFARCTION): ICD-10-CM

## 2024-06-01 DIAGNOSIS — I21.02 ST ELEVATION MYOCARDIAL INFARCTION INVOLVING LEFT ANTERIOR DESCENDING (LAD) CORONARY ARTERY: Primary | ICD-10-CM

## 2024-06-01 PROBLEM — F19.90 RECREATIONAL DRUG USE: Status: ACTIVE | Noted: 2024-06-01

## 2024-06-01 LAB
ALBUMIN SERPL BCP-MCNC: 4.1 G/DL (ref 3.5–5.2)
ALP SERPL-CCNC: 76 U/L (ref 55–135)
ALT SERPL W/O P-5'-P-CCNC: 39 U/L (ref 10–44)
AMPHET+METHAMPHET UR QL: ABNORMAL
ANION GAP SERPL CALC-SCNC: 18 MMOL/L (ref 8–16)
AST SERPL-CCNC: 36 U/L (ref 10–40)
BARBITURATES UR QL SCN>200 NG/ML: NEGATIVE
BASOPHILS # BLD AUTO: 0.04 K/UL (ref 0–0.2)
BASOPHILS NFR BLD: 0.4 % (ref 0–1.9)
BENZODIAZ UR QL SCN>200 NG/ML: ABNORMAL
BILIRUB SERPL-MCNC: 0.7 MG/DL (ref 0.1–1)
BNP SERPL-MCNC: <10 PG/ML (ref 0–99)
BUN SERPL-MCNC: 9 MG/DL (ref 6–20)
BZE UR QL SCN: NEGATIVE
CALCIUM SERPL-MCNC: 9.4 MG/DL (ref 8.7–10.5)
CANNABINOIDS UR QL SCN: ABNORMAL
CHLORIDE SERPL-SCNC: 103 MMOL/L (ref 95–110)
CO2 SERPL-SCNC: 18 MMOL/L (ref 23–29)
CREAT SERPL-MCNC: 1.1 MG/DL (ref 0.5–1.4)
CREAT UR-MCNC: 72.1 MG/DL (ref 23–375)
DIFFERENTIAL METHOD BLD: ABNORMAL
EOSINOPHIL # BLD AUTO: 0.1 K/UL (ref 0–0.5)
EOSINOPHIL NFR BLD: 1.2 % (ref 0–8)
ERYTHROCYTE [DISTWIDTH] IN BLOOD BY AUTOMATED COUNT: 13 % (ref 11.5–14.5)
EST. GFR  (NO RACE VARIABLE): >60 ML/MIN/1.73 M^2
GLUCOSE SERPL-MCNC: 165 MG/DL (ref 70–110)
HCT VFR BLD AUTO: 39.5 % (ref 40–54)
HGB BLD-MCNC: 12.4 G/DL (ref 14–18)
IMM GRANULOCYTES # BLD AUTO: 0.04 K/UL (ref 0–0.04)
IMM GRANULOCYTES NFR BLD AUTO: 0.4 % (ref 0–0.5)
LYMPHOCYTES # BLD AUTO: 3.4 K/UL (ref 1–4.8)
LYMPHOCYTES NFR BLD: 30.4 % (ref 18–48)
MCH RBC QN AUTO: 25.8 PG (ref 27–31)
MCHC RBC AUTO-ENTMCNC: 31.4 G/DL (ref 32–36)
MCV RBC AUTO: 82 FL (ref 82–98)
METHADONE UR QL SCN>300 NG/ML: NEGATIVE
MONOCYTES # BLD AUTO: 1.1 K/UL (ref 0.3–1)
MONOCYTES NFR BLD: 9.9 % (ref 4–15)
NEUTROPHILS # BLD AUTO: 6.5 K/UL (ref 1.8–7.7)
NEUTROPHILS NFR BLD: 57.7 % (ref 38–73)
NRBC BLD-RTO: 0 /100 WBC
OPIATES UR QL SCN: NEGATIVE
PCP UR QL SCN>25 NG/ML: NEGATIVE
PLATELET # BLD AUTO: 362 K/UL (ref 150–450)
PMV BLD AUTO: 10.2 FL (ref 9.2–12.9)
POC ACTIVATED CLOTTING TIME K: 207 SEC (ref 74–137)
POC ACTIVATED CLOTTING TIME K: 220 SEC (ref 74–137)
POCT GLUCOSE: 115 MG/DL (ref 70–110)
POTASSIUM SERPL-SCNC: 3.9 MMOL/L (ref 3.5–5.1)
PROT SERPL-MCNC: 8.3 G/DL (ref 6–8.4)
RBC # BLD AUTO: 4.81 M/UL (ref 4.6–6.2)
SAMPLE: ABNORMAL
SAMPLE: ABNORMAL
SODIUM SERPL-SCNC: 139 MMOL/L (ref 136–145)
TOXICOLOGY INFORMATION: ABNORMAL
TROPONIN I SERPL DL<=0.01 NG/ML-MCNC: <0.006 NG/ML (ref 0–0.03)
WBC # BLD AUTO: 11.32 K/UL (ref 3.9–12.7)

## 2024-06-01 PROCEDURE — 99152 MOD SED SAME PHYS/QHP 5/>YRS: CPT | Mod: ,,, | Performed by: INTERNAL MEDICINE

## 2024-06-01 PROCEDURE — 027034Z DILATION OF CORONARY ARTERY, ONE ARTERY WITH DRUG-ELUTING INTRALUMINAL DEVICE, PERCUTANEOUS APPROACH: ICD-10-PCS | Performed by: INTERNAL MEDICINE

## 2024-06-01 PROCEDURE — 25000242 PHARM REV CODE 250 ALT 637 W/ HCPCS: Performed by: EMERGENCY MEDICINE

## 2024-06-01 PROCEDURE — 93458 L HRT ARTERY/VENTRICLE ANGIO: CPT | Mod: 26,51,59, | Performed by: INTERNAL MEDICINE

## 2024-06-01 PROCEDURE — 92941 PRQ TRLML REVSC TOT OCCL AMI: CPT | Mod: LD,,, | Performed by: INTERNAL MEDICINE

## 2024-06-01 PROCEDURE — 63600175 PHARM REV CODE 636 W HCPCS: Mod: JZ,JG | Performed by: INTERNAL MEDICINE

## 2024-06-01 PROCEDURE — 02C03ZZ EXTIRPATION OF MATTER FROM CORONARY ARTERY, ONE ARTERY, PERCUTANEOUS APPROACH: ICD-10-PCS | Performed by: INTERNAL MEDICINE

## 2024-06-01 PROCEDURE — 80307 DRUG TEST PRSMV CHEM ANLYZR: CPT | Performed by: EMERGENCY MEDICINE

## 2024-06-01 PROCEDURE — 96374 THER/PROPH/DIAG INJ IV PUSH: CPT

## 2024-06-01 PROCEDURE — C1725 CATH, TRANSLUMIN NON-LASER: HCPCS | Performed by: INTERNAL MEDICINE

## 2024-06-01 PROCEDURE — C1894 INTRO/SHEATH, NON-LASER: HCPCS | Performed by: INTERNAL MEDICINE

## 2024-06-01 PROCEDURE — 93010 ELECTROCARDIOGRAM REPORT: CPT | Mod: ,,, | Performed by: STUDENT IN AN ORGANIZED HEALTH CARE EDUCATION/TRAINING PROGRAM

## 2024-06-01 PROCEDURE — C1757 CATH, THROMBECTOMY/EMBOLECT: HCPCS | Performed by: INTERNAL MEDICINE

## 2024-06-01 PROCEDURE — 93005 ELECTROCARDIOGRAM TRACING: CPT

## 2024-06-01 PROCEDURE — 63600175 PHARM REV CODE 636 W HCPCS: Performed by: INTERNAL MEDICINE

## 2024-06-01 PROCEDURE — 85347 COAGULATION TIME ACTIVATED: CPT | Performed by: INTERNAL MEDICINE

## 2024-06-01 PROCEDURE — 20000000 HC ICU ROOM

## 2024-06-01 PROCEDURE — 25000003 PHARM REV CODE 250: Performed by: EMERGENCY MEDICINE

## 2024-06-01 PROCEDURE — 93458 L HRT ARTERY/VENTRICLE ANGIO: CPT | Mod: 59 | Performed by: INTERNAL MEDICINE

## 2024-06-01 PROCEDURE — 96375 TX/PRO/DX INJ NEW DRUG ADDON: CPT

## 2024-06-01 PROCEDURE — B2151ZZ FLUOROSCOPY OF LEFT HEART USING LOW OSMOLAR CONTRAST: ICD-10-PCS | Performed by: INTERNAL MEDICINE

## 2024-06-01 PROCEDURE — 63600175 PHARM REV CODE 636 W HCPCS: Performed by: EMERGENCY MEDICINE

## 2024-06-01 PROCEDURE — C1769 GUIDE WIRE: HCPCS | Performed by: INTERNAL MEDICINE

## 2024-06-01 PROCEDURE — 25500020 PHARM REV CODE 255: Performed by: INTERNAL MEDICINE

## 2024-06-01 PROCEDURE — C1760 CLOSURE DEV, VASC: HCPCS | Performed by: INTERNAL MEDICINE

## 2024-06-01 PROCEDURE — C1876 STENT, NON-COA/NON-COV W/DEL: HCPCS | Performed by: INTERNAL MEDICINE

## 2024-06-01 PROCEDURE — 94761 N-INVAS EAR/PLS OXIMETRY MLT: CPT

## 2024-06-01 PROCEDURE — 25000003 PHARM REV CODE 250: Performed by: INTERNAL MEDICINE

## 2024-06-01 PROCEDURE — 84484 ASSAY OF TROPONIN QUANT: CPT | Performed by: EMERGENCY MEDICINE

## 2024-06-01 PROCEDURE — 85025 COMPLETE CBC W/AUTO DIFF WBC: CPT | Performed by: EMERGENCY MEDICINE

## 2024-06-01 PROCEDURE — 93010 ELECTROCARDIOGRAM REPORT: CPT | Mod: 59,,, | Performed by: STUDENT IN AN ORGANIZED HEALTH CARE EDUCATION/TRAINING PROGRAM

## 2024-06-01 PROCEDURE — C1887 CATHETER, GUIDING: HCPCS | Performed by: INTERNAL MEDICINE

## 2024-06-01 PROCEDURE — 4A023N7 MEASUREMENT OF CARDIAC SAMPLING AND PRESSURE, LEFT HEART, PERCUTANEOUS APPROACH: ICD-10-PCS | Performed by: INTERNAL MEDICINE

## 2024-06-01 PROCEDURE — 80053 COMPREHEN METABOLIC PANEL: CPT | Performed by: EMERGENCY MEDICINE

## 2024-06-01 PROCEDURE — 83880 ASSAY OF NATRIURETIC PEPTIDE: CPT | Performed by: EMERGENCY MEDICINE

## 2024-06-01 PROCEDURE — 25000003 PHARM REV CODE 250: Performed by: STUDENT IN AN ORGANIZED HEALTH CARE EDUCATION/TRAINING PROGRAM

## 2024-06-01 PROCEDURE — C9606 PERC D-E COR REVASC W AMI S: HCPCS | Mod: LD | Performed by: INTERNAL MEDICINE

## 2024-06-01 PROCEDURE — 99291 CRITICAL CARE FIRST HOUR: CPT

## 2024-06-01 PROCEDURE — 27201423 OPTIME MED/SURG SUP & DEVICES STERILE SUPPLY: Performed by: INTERNAL MEDICINE

## 2024-06-01 PROCEDURE — B2111ZZ FLUOROSCOPY OF MULTIPLE CORONARY ARTERIES USING LOW OSMOLAR CONTRAST: ICD-10-PCS | Performed by: INTERNAL MEDICINE

## 2024-06-01 DEVICE — EVEROLIMUS-ELUTING PLATINUM CHROMIUM CORONARY STENT SYSTEM
Type: IMPLANTABLE DEVICE | Site: CORONARY | Status: FUNCTIONAL
Brand: SYNERGY MEGATRON™

## 2024-06-01 RX ORDER — CLOPIDOGREL BISULFATE 300 MG/1
TABLET, FILM COATED ORAL
Status: DISCONTINUED | OUTPATIENT
Start: 2024-06-01 | End: 2024-06-01 | Stop reason: HOSPADM

## 2024-06-01 RX ORDER — SODIUM CHLORIDE 9 MG/ML
INJECTION, SOLUTION INTRAVENOUS CONTINUOUS
Status: ACTIVE | OUTPATIENT
Start: 2024-06-01 | End: 2024-06-02

## 2024-06-01 RX ORDER — NITROGLYCERIN 0.4 MG/1
TABLET SUBLINGUAL CODE/TRAUMA/SEDATION MEDICATION
Status: DISCONTINUED | OUTPATIENT
Start: 2024-06-01 | End: 2024-06-01 | Stop reason: SDUPTHER

## 2024-06-01 RX ORDER — ASPIRIN 81 MG/1
81 TABLET ORAL DAILY
Status: DISCONTINUED | OUTPATIENT
Start: 2024-06-02 | End: 2024-06-06 | Stop reason: HOSPADM

## 2024-06-01 RX ORDER — ASPIRIN 325 MG
325 TABLET ORAL
Status: ACTIVE | OUTPATIENT
Start: 2024-06-01 | End: 2024-06-02

## 2024-06-01 RX ORDER — HEPARIN SODIUM,PORCINE/D5W 25000/250
0-40 INTRAVENOUS SOLUTION INTRAVENOUS CONTINUOUS
Status: DISCONTINUED | OUTPATIENT
Start: 2024-06-01 | End: 2024-06-01

## 2024-06-01 RX ORDER — HEPARIN SODIUM 5000 [USP'U]/ML
5000 INJECTION, SOLUTION INTRAVENOUS; SUBCUTANEOUS ONCE
Status: DISCONTINUED | OUTPATIENT
Start: 2024-06-01 | End: 2024-06-02 | Stop reason: ALTCHOICE

## 2024-06-01 RX ORDER — CLOPIDOGREL BISULFATE 300 MG/1
300 TABLET, FILM COATED ORAL ONCE
Status: COMPLETED | OUTPATIENT
Start: 2024-06-01 | End: 2024-06-01

## 2024-06-01 RX ORDER — TIROFIBAN HYDROCHLORIDE 50 UG/ML
INJECTION INTRAVENOUS
Status: DISCONTINUED | OUTPATIENT
Start: 2024-06-01 | End: 2024-06-06 | Stop reason: HOSPADM

## 2024-06-01 RX ORDER — OXYCODONE HYDROCHLORIDE 5 MG/1
10 TABLET ORAL EVERY 4 HOURS PRN
Status: DISCONTINUED | OUTPATIENT
Start: 2024-06-01 | End: 2024-06-02 | Stop reason: SDUPTHER

## 2024-06-01 RX ORDER — CLOPIDOGREL BISULFATE 75 MG/1
75 TABLET ORAL DAILY
Status: DISCONTINUED | OUTPATIENT
Start: 2024-06-02 | End: 2024-06-04

## 2024-06-01 RX ORDER — ACETAMINOPHEN 325 MG/1
650 TABLET ORAL EVERY 4 HOURS PRN
Status: DISCONTINUED | OUTPATIENT
Start: 2024-06-01 | End: 2024-06-02 | Stop reason: SDUPTHER

## 2024-06-01 RX ORDER — NITROGLYCERIN 5 MG/ML
INJECTION, SOLUTION INTRAVENOUS
Status: DISCONTINUED | OUTPATIENT
Start: 2024-06-01 | End: 2024-06-01 | Stop reason: HOSPADM

## 2024-06-01 RX ORDER — HEPARIN SODIUM 1000 [USP'U]/ML
INJECTION, SOLUTION INTRAVENOUS; SUBCUTANEOUS
Status: DISCONTINUED | OUTPATIENT
Start: 2024-06-01 | End: 2024-06-01 | Stop reason: HOSPADM

## 2024-06-01 RX ORDER — ATORVASTATIN CALCIUM 40 MG/1
80 TABLET, FILM COATED ORAL NIGHTLY
Status: DISCONTINUED | OUTPATIENT
Start: 2024-06-01 | End: 2024-06-06 | Stop reason: HOSPADM

## 2024-06-01 RX ORDER — LIDOCAINE HYDROCHLORIDE 20 MG/ML
INJECTION, SOLUTION INFILTRATION; PERINEURAL
Status: DISCONTINUED | OUTPATIENT
Start: 2024-06-01 | End: 2024-06-01 | Stop reason: HOSPADM

## 2024-06-01 RX ORDER — LOSARTAN POTASSIUM 25 MG/1
25 TABLET ORAL DAILY
Status: DISCONTINUED | OUTPATIENT
Start: 2024-06-01 | End: 2024-06-06 | Stop reason: HOSPADM

## 2024-06-01 RX ORDER — ONDANSETRON HYDROCHLORIDE 2 MG/ML
4 INJECTION, SOLUTION INTRAVENOUS
Status: COMPLETED | OUTPATIENT
Start: 2024-06-01 | End: 2024-06-01

## 2024-06-01 RX ORDER — HYDROCODONE BITARTRATE AND ACETAMINOPHEN 5; 325 MG/1; MG/1
1 TABLET ORAL EVERY 4 HOURS PRN
Status: DISCONTINUED | OUTPATIENT
Start: 2024-06-01 | End: 2024-06-02 | Stop reason: SDUPTHER

## 2024-06-01 RX ORDER — FENTANYL CITRATE 50 UG/ML
INJECTION, SOLUTION INTRAMUSCULAR; INTRAVENOUS
Status: DISCONTINUED | OUTPATIENT
Start: 2024-06-01 | End: 2024-06-01 | Stop reason: HOSPADM

## 2024-06-01 RX ORDER — METOPROLOL TARTRATE 25 MG/1
25 TABLET, FILM COATED ORAL 2 TIMES DAILY
Status: DISCONTINUED | OUTPATIENT
Start: 2024-06-01 | End: 2024-06-04

## 2024-06-01 RX ORDER — ATROPINE SULFATE 0.1 MG/ML
0.5 INJECTION INTRAVENOUS
Status: DISCONTINUED | OUTPATIENT
Start: 2024-06-01 | End: 2024-06-02 | Stop reason: SDUPTHER

## 2024-06-01 RX ORDER — NITROGLYCERIN 0.4 MG/1
0.4 TABLET SUBLINGUAL EVERY 5 MIN PRN
Status: DISCONTINUED | OUTPATIENT
Start: 2024-06-01 | End: 2024-06-02 | Stop reason: SDUPTHER

## 2024-06-01 RX ORDER — ONDANSETRON 8 MG/1
8 TABLET, ORALLY DISINTEGRATING ORAL EVERY 8 HOURS PRN
Status: DISCONTINUED | OUTPATIENT
Start: 2024-06-01 | End: 2024-06-02 | Stop reason: SDUPTHER

## 2024-06-01 RX ORDER — DIPHENHYDRAMINE HYDROCHLORIDE 50 MG/ML
INJECTION INTRAMUSCULAR; INTRAVENOUS
Status: DISCONTINUED | OUTPATIENT
Start: 2024-06-01 | End: 2024-06-01 | Stop reason: HOSPADM

## 2024-06-01 RX ORDER — MIDAZOLAM HYDROCHLORIDE 1 MG/ML
INJECTION, SOLUTION INTRAMUSCULAR; INTRAVENOUS
Status: DISCONTINUED | OUTPATIENT
Start: 2024-06-01 | End: 2024-06-01 | Stop reason: HOSPADM

## 2024-06-01 RX ORDER — TIROFIBAN HYDROCHLORIDE 50 UG/ML
0.15 INJECTION INTRAVENOUS CONTINUOUS
Status: DISPENSED | OUTPATIENT
Start: 2024-06-01 | End: 2024-06-02

## 2024-06-01 RX ADMIN — NITROGLYCERIN 0.4 MG: 0.4 TABLET SUBLINGUAL at 02:06

## 2024-06-01 RX ADMIN — LOSARTAN POTASSIUM 25 MG: 25 TABLET, FILM COATED ORAL at 05:06

## 2024-06-01 RX ADMIN — ONDANSETRON 4 MG: 2 INJECTION INTRAMUSCULAR; INTRAVENOUS at 02:06

## 2024-06-01 RX ADMIN — ATORVASTATIN CALCIUM 80 MG: 40 TABLET, FILM COATED ORAL at 08:06

## 2024-06-01 RX ADMIN — NITROGLYCERIN 1 INCH: 20 OINTMENT TOPICAL at 05:06

## 2024-06-01 RX ADMIN — ONDANSETRON 8 MG: 8 TABLET, ORALLY DISINTEGRATING ORAL at 09:06

## 2024-06-01 RX ADMIN — CLOPIDOGREL BISULFATE 300 MG: 300 TABLET, FILM COATED ORAL at 02:06

## 2024-06-01 RX ADMIN — NITROGLYCERIN 0.4 MG: 0.4 TABLET SUBLINGUAL at 01:06

## 2024-06-01 RX ADMIN — TIROFIBAN 0.15 MCG/KG/MIN: 5 INJECTION, SOLUTION INTRAVENOUS at 08:06

## 2024-06-01 RX ADMIN — HYDROCODONE BITARTRATE AND ACETAMINOPHEN 1 TABLET: 5; 325 TABLET ORAL at 08:06

## 2024-06-01 RX ADMIN — NITROGLYCERIN 1 INCH: 20 OINTMENT TOPICAL at 11:06

## 2024-06-01 RX ADMIN — HYDROCODONE BITARTRATE AND ACETAMINOPHEN 1 TABLET: 5; 325 TABLET ORAL at 04:06

## 2024-06-01 RX ADMIN — METOPROLOL TARTRATE 25 MG: 25 TABLET, FILM COATED ORAL at 08:06

## 2024-06-01 NOTE — ED NOTES
Spoke with patient's mother Lizbet ([phone]). Informed her that her son was in the emergency room and stable. Mother is on her way.

## 2024-06-01 NOTE — PHARMACY MED REC
"Admission Medication History     The home medication history was taken by Cate Daily.    You may go to "Admission" then "Reconcile Home Medications" tabs to review and/or act upon these items.     The home medication list has been updated by the Pharmacy department.   Please read ALL comments highlighted in yellow.   Please address this information as you see fit.    Feel free to contact us if you have any questions or require assistance.      The medications listed below were removed from the home medication list. Please reorder if appropriate:  Patient reports no longer taking the following medication(s):  Norco 7.5-325        Medications listed below were obtained from: [employer] software- [employer]      LAST [facility] REC COMPLETED:       Cate Daily  [id number]      Current Outpatient Medications on File Prior to Encounter   Medication Sig Dispense Refill Last Dose                                     .          "

## 2024-06-01 NOTE — H&P
Consult Note  Cardiology    Consult Requested By:  Reason for Consult:     SUBJECTIVE:     History of Present Illness:  Patient is a 28 y.o. male presents with CP while getting haircut. Pt without any significant PMhx. 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 EKG with acute anterolateral MI.    CRF- mother with CAD/CABG and MI    Review of patient's allergies indicates:  No Known Allergies    No past medical history on file.  Past Surgical History:   Procedure Laterality Date    ORIF MANDIBULAR FRACTURE Bilateral 5/31/2021    Procedure: ORIF, FRACTURE, MANDIBLE;  Surgeon: Michael Newberry MD;  Location: [facility];  Service: Plastics;  Laterality: Bilateral;  Nasal intubation     No family history on file.  Social History     Tobacco Use    Smoking status: Some Days     Types: Cigarettes    Smokeless tobacco: Never   Substance Use Topics    Alcohol use: Never    Drug use: Never        Review of Systems:  Constitutional: negative  Eyes: negative  Ears, nose, mouth, throat, and face: negative  Respiratory: negative  Cardiovascular: negative  Gastrointestinal: negative  Genitourinary:negative  Hematologic/lymphatic: negative  Musculoskeletal:negative,   Neurological: negative  Behavioral/Psych: negative  Endocrine: negative  Allergic/Immunologic: negative    OBJECTIVE:     Vital Signs (Most Recent)  Temp: 98.3 °F (36.8 °C) (06/01/24 1405)  Pulse: 99 (06/01/24 1410)  Resp: (!) 26 (06/01/24 1410)  BP: (!) 149/90 (06/01/24 1410)  SpO2: 100 % (06/01/24 1410)    Vital Signs Range (Last 24H):  Temp:  [98.3 °F (36.8 °C)]   Pulse:  [92-99]   Resp:  [22-26]   BP: (122-161)/(90-93)   SpO2:  [100 %]     Current Facility-Administered Medications   Medication    0.9%  NaCl infusion    acetaminophen tablet 650 mg    [START ON 6/2/2024] aspirin EC tablet 81 mg    aspirin tablet 325 mg    atorvastatin tablet 80 mg    atropine injection 0.5 mg    [START ON 6/2/2024] clopidogreL tablet 75 mg    heparin (PORCINE) bolus from bag    heparin  (porcine) injection 5,000 Units    HYDROcodone-acetaminophen 5-325 mg per tablet 1 tablet    metoprolol tartrate (LOPRESSOR) tablet 25 mg    nitroGLYCERIN SL tablet 0.4 mg    ondansetron disintegrating tablet 8 mg    oxyCODONE immediate release tablet 10 mg    tirofiban 12.5 mg in NS 12.5 MG/ 250 mL bolus from bag    tirofiban 12.5 mg in sodium chloride 0.9% 250 mL infusion    tirofiban 12.5 mg in sodium chloride 0.9% 250 mL infusion     No current facility-administered medications on file prior to encounter.     Current Outpatient Medications on File Prior to Encounter   Medication Sig    [DISCONTINUED] hydrocodone-acetaminophen (HYCET) solution 7.5-325 mg/15mL TAKE 15 ML Every 6 hours as needed for pain       Physical Exam:  General appearance: alert, appears stated age and cooperative  Head: Normocephalic, without obvious abnormality, atraumatic  Eyes:  conjunctivae/corneas clear. PERRL, EOM's intact. Fundi benign.  Nose: no discharge  Throat: normal findings: tongue midline and normal  Neck: no adenopathy, no carotid bruit, no JVD, supple, symmetrical, trachea midline and thyroid not enlarged, symmetric, no tenderness/mass/nodules  Back:  no skin lesions, erythema, or scars  Lungs:  clear to auscultation bilaterally  Chest wall: no tenderness  Heart: regular rate and rhythm, S1, S2 normal, no murmur, click, rub or gallop  Abdomen: soft, non-tender; bowel sounds normal; no masses,  no organomegaly  Extremities: extremities normal, atraumatic, no cyanosis or edema  Pulses: 2+ and symmetric  Skin: Skin color, texture, turgor normal. No rashes or lesions  Neurologic: Grossly normal    Laboratory:  Chemistry:   Lab Results   Component Value Date     06/01/2024    K 3.9 06/01/2024     06/01/2024    CO2 18 (L) 06/01/2024    BUN 9 06/01/2024    CREATININE 1.1 06/01/2024    GLUCOSE 89 06/28/2021    CALCIUM 9.4 06/01/2024     Cardiac Markers:   Lab Results   Component Value Date    TROPONINI <0.006 06/01/2024  "    Cardiac Markers (Last 3):   Lab Results   Component Value Date    TROPONINI <0.006 06/01/2024     CBC:   Lab Results   Component Value Date    WBC 11.32 06/01/2024    HGB 12.4 (L) 06/01/2024    HCT 39.5 (L) 06/01/2024    MCV 82 06/01/2024     06/01/2024     Lipids: No results found for: "CHOL", "TRIG", "HDL", "LDLDIRECT"  Coagulation: No results found for: "PT", "INR", "APTT"    Diagnostic Results:  ECG: Reviewed  X-Ray: Reviewed  US: Reviewed  CT: Reviewed  Echo: Reviewed      ASSESSMENT/PLAN:     Patient Active Problem List   Diagnosis    Mandible fracture    Acute anterolaterla MI    Plan:     Asa, plavix, IV heparin, b blockers, statin  To cath lab  "

## 2024-06-01 NOTE — ED PROVIDER NOTES
SCRIBE #1 NOTE: I, Navin Mcdonald, am scribing for, and in the presence of, Souleymane Callahan MD. I have scribed the entire note.       History     Chief Complaint   Patient presents with    Chest Pain     Pt c/o 10/10 CP that began around 1220 today. EMS reports STEMI. 325mg ASA given en route.     Review of patient's allergies indicates:  No Known Allergies      History of Present Illness     HPI    6/1/2024, 2:06 PM  History obtained from the patient and EMS      History of Present Illness: Wilfred Beach is a 28 y.o. male patient with a maternal PMHx of MI who presents to the Emergency Department for evaluation of CP which onset suddenly while getting a haircut at the [employer] shop. Pt had eaten chicken around noon. Pt smoked marijuana prior to getting a haircut. Pt's pain is a 10/10. Pt went into Vtach while en route, and pt's BP was 120/80. No shocks were given. No amiodarone was given. He converted spontaneously. Nitro improved pain while in room. Symptoms are constant and moderate in severity. No mitigating or exacerbating factors reported. Associated sxs include N/V, chest tightness. Patient denies any fever, SOB, abd pain, dizziness, and all other sxs at this time. Prior Tx includes 325 mg en route. Pt had no meds prior to EMS arrival. No further complaints or concerns at this time.       Arrival mode: EMS    PCP: No, Primary Doctor        Past Medical History:  No past medical history on file.    Past Surgical History:  Past Surgical History:   Procedure Laterality Date    ORIF MANDIBULAR FRACTURE Bilateral 5/31/2021    Procedure: ORIF, FRACTURE, MANDIBLE;  Surgeon: Michael Newberry MD;  Location: [facility] OR;  Service: Plastics;  Laterality: Bilateral;  Nasal intubation         Family History:  Mother PMHx of MI.    Social History:  Social History     Tobacco Use    Smoking status: Some Days     Types: Cigarettes    Smokeless tobacco: Never   Substance and Sexual Activity    Alcohol use: Never    Drug  use: Never    Sexual activity: Not on file        Review of Systems     Review of Systems   Constitutional:  Negative for chills and fever.   HENT:  Negative for congestion and sore throat.    Respiratory:  Negative for cough and shortness of breath.    Cardiovascular:  Positive for chest pain.   Gastrointestinal:  Positive for nausea and vomiting. Negative for abdominal pain.   Genitourinary:  Negative for dysuria.   Musculoskeletal:  Negative for back pain.   Skin:  Negative for rash.   Neurological:  Negative for dizziness, seizures, weakness, light-headedness, numbness and headaches.   Hematological:  Does not bruise/bleed easily.   All other systems reviewed and are negative.       Physical Exam     Initial Vitals   BP Pulse Resp Temp SpO2   06/01/24 1355 06/01/24 1355 06/01/24 1355 06/01/24 1405 06/01/24 1355   (!) 122/90 92 (!) 22 98.3 °F (36.8 °C) 100 %      MAP       --                 Physical Exam  Nursing Notes and Vital Signs Reviewed.  Constitutional: Patient is in mild distress. Well-developed and well-nourished.  Head: Atraumatic. Normocephalic.  Eyes: PERRL. EOM intact. Conjunctivae are not pale. No scleral icterus.  ENT: Mucous membranes are moist. Oropharynx is clear and symmetric.    Neck: Supple. Full ROM. No lymphadenopathy.  Cardiovascular: Regular rate. Regular rhythm. No murmurs, rubs, or gallops. Distal pulses are 2+ and symmetric.  Pulmonary/Chest: No respiratory distress. Clear to auscultation bilaterally. No wheezing or rales.  Abdominal: Soft and non-distended.  There is no tenderness.  No rebound, guarding, or rigidity. Good bowel sounds.  Genitourinary: No CVA tenderness  Musculoskeletal: Moves all extremities. No obvious deformities. No edema. No calf tenderness.  Skin: Warm and dry.  Neurological:  Alert, awake, and appropriate.  Normal speech.  No acute focal neurological deficits are appreciated.  Psychiatric: Normal affect. Good eye contact. Appropriate in content.     ED Course  "  Critical Care    Date/Time: 6/1/2024 2:06 PM    Performed by: Souleymane Callahan MD  Authorized by: Souleymane Callahan MD  Direct patient critical care time: 23 minutes  Additional history critical care time: 16 minutes  Ordering / reviewing critical care time: 8 minutes  Documentation critical care time: 8 minutes  Consulting other physicians critical care time: 8 minutes  Total critical care time (exclusive of procedural time) : 63 minutes  Critical care time was exclusive of separately billable procedures and treating other patients and teaching time.  Critical care was necessary to treat or prevent imminent or life-threatening deterioration of the following conditions: cardiac failure (STEMI).  Critical care was time spent personally by me on the following activities: blood draw for specimens, development of treatment plan with patient or surrogate, discussions with consultants, interpretation of cardiac output measurements, evaluation of patient's response to treatment, obtaining history from patient or surrogate, examination of patient, ordering and review of laboratory studies, ordering and performing treatments and interventions, ordering and review of radiographic studies, pulse oximetry, re-evaluation of patient's condition and review of old charts.        ED Vital Signs:  Vitals:    06/01/24 1355 06/01/24 1357 06/01/24 1400 06/01/24 1401   BP: (!) 122/90  (!) 161/93    Pulse: 92  96 95   Resp: (!) 22 (!) 22 (!) 23    Temp:       TempSrc:       SpO2: 100% 100% 100%    Weight:       Height:        06/01/24 1403 06/01/24 1405 06/01/24 1410   BP:   (!) 149/90   Pulse:   99   Resp:   (!) 26   Temp:  98.3 °F (36.8 °C)    TempSrc:  Oral    SpO2:   100%   Weight: 89 kg (196 lb 3.2 oz)     Height: 5' 6" (1.676 m)         Abnormal Lab Results:  Labs Reviewed   DRUG SCREEN PANEL, URINE EMERGENCY        All Lab Results:  Results for orders placed or performed during the hospital encounter of 06/01/24   Brain natriuretic " peptide   Result Value Ref Range    BNP <10 0 - 99 pg/mL   Troponin I   Result Value Ref Range    Troponin I <0.006 0.000 - 0.026 ng/mL         Imaging Results:  Imaging Results    None          The EKG was ordered, reviewed, and independently interpreted by the ED provider.  Interpretation time: 13:55  Rate: 96 BPM  Rhythm: normal sinus rhythm  Interpretation: Lateral infarct. STEMI.             The Emergency Provider reviewed the vital signs and test results, which are outlined above.     ED Discussion       2:36 PM: Discussed case with Dr. Mcnamara ([facility]). Dr. Mcnamara  agrees with current care and management of pt and accepts admission.   Admitting Service: Cardiology  Admitting Physician: Dr. Mcnamara   Admit to: Cath Lab    2:36 PM: Re-evaluated pt. I have discussed test results, shared treatment plan, and the need for admission with patient and family at bedside. Pt and family express understanding at this time and agree with all information. All questions answered. Pt and family have no further questions or concerns at this time. Pt is ready for admit.        Medical Decision Making  Amount and/or Complexity of Data Reviewed  Labs: ordered. Decision-making details documented in ED Course.  ECG/medicine tests: ordered and independent interpretation performed. Decision-making details documented in ED Course.    Risk  OTC drugs.  Prescription drug management.  Decision regarding hospitalization.                ED Medication(s):  Medications   aspirin tablet 325 mg (has no administration in time range)   heparin (porcine) injection 5,000 Units (has no administration in time range)   fentaNYL 50 mcg/mL injection (25 mcg Intravenous Given 6/1/24 1453)   midazolam injection (1 mg Intravenous Given 6/1/24 1446)   diphenhydrAMINE injection (25 mg Intravenous Given 6/1/24 1427)   nitroGLYCERIN SL tablet (0.4 mg Sublingual Given 6/1/24 1420)   LIDOcaine HCL 20 mg/ml (2%) injection (5 mLs Subcutaneous Given  6/1/24 1428)   heparin (PORCINE) bolus from bag (5,000 Units Intravenous Given 6/1/24 1358)   heparin (porcine) injection (2,000 Units Intravenous Given 6/1/24 1452)   nitroGLYCERIN injection (100 mcg Intra-arterial Given 6/1/24 1456)   ondansetron injection 4 mg (4 mg Intravenous Given 6/1/24 1402)   clopidogreL tablet 300 mg (300 mg Oral Given 6/1/24 1404)       There are no discharge medications for this patient.              Scribe Attestation:   Scribe #1: I performed the above scribed service and the documentation accurately describes the services I performed. I attest to the accuracy of the note.     Attending:   Physician Attestation Statement for Scribe #1: I, Souleymane Callahan MD, personally performed the services described in this documentation, as scribed by Navin Mcdonald, in my presence, and it is both accurate and complete.           Clinical Impression       ICD-10-CM ICD-9-CM   1. STEMI (ST elevation myocardial infarction)  I21.3 410.90       Disposition:   Disposition: Admitted  Condition: Serious        Souleymane Callahan MD  06/01/24 8917

## 2024-06-01 NOTE — NURSING
Pt received from cath lab, hematoma noted to right groin. Manual pressure held x 10 min by cath lab RN, then sandbag placed. Aggrastat stopped per Dr. Mcnamara. Pulses strong and equal. Puncture site monitored q15 and remains WNL. Pt given Norco for pain, c/o CP 7/10 left sternal. Nitropaste ordered by MD. MARTIN. Advised he must lay flat for min 4 hours. Mother updated at bedside with patient.

## 2024-06-02 LAB
ALBUMIN SERPL BCP-MCNC: 3.6 G/DL (ref 3.5–5.2)
ALP SERPL-CCNC: 69 U/L (ref 55–135)
ALT SERPL W/O P-5'-P-CCNC: 58 U/L (ref 10–44)
ANION GAP SERPL CALC-SCNC: 12 MMOL/L (ref 8–16)
ANION GAP SERPL CALC-SCNC: 12 MMOL/L (ref 8–16)
ASCENDING AORTA: 2.69 CM
AST SERPL-CCNC: 224 U/L (ref 10–40)
AV INDEX (PROSTH): 0.65
AV MEAN GRADIENT: 4 MMHG
AV PEAK GRADIENT: 8 MMHG
AV VALVE AREA BY VELOCITY RATIO: 2.17 CM²
AV VALVE AREA: 2.24 CM²
AV VELOCITY RATIO: 0.63
BILIRUB SERPL-MCNC: 0.7 MG/DL (ref 0.1–1)
BSA FOR ECHO PROCEDURE: 2.04 M2
BUN SERPL-MCNC: 7 MG/DL (ref 6–20)
BUN SERPL-MCNC: 7 MG/DL (ref 6–20)
CALCIUM SERPL-MCNC: 9.1 MG/DL (ref 8.7–10.5)
CALCIUM SERPL-MCNC: 9.1 MG/DL (ref 8.7–10.5)
CATH EF QUANTITATIVE: 50 %
CHLORIDE SERPL-SCNC: 106 MMOL/L (ref 95–110)
CHLORIDE SERPL-SCNC: 106 MMOL/L (ref 95–110)
CHOLEST SERPL-MCNC: 140 MG/DL (ref 120–199)
CHOLEST/HDLC SERPL: 4.4 {RATIO} (ref 2–5)
CO2 SERPL-SCNC: 21 MMOL/L (ref 23–29)
CO2 SERPL-SCNC: 21 MMOL/L (ref 23–29)
CREAT SERPL-MCNC: 0.8 MG/DL (ref 0.5–1.4)
CREAT SERPL-MCNC: 0.8 MG/DL (ref 0.5–1.4)
CV ECHO LV RWT: 0.51 CM
DOP CALC AO PEAK VEL: 1.42 M/S
DOP CALC AO VTI: 24.6 CM
DOP CALC LVOT AREA: 3.5 CM2
DOP CALC LVOT DIAMETER: 2.1 CM
DOP CALC LVOT PEAK VEL: 0.89 M/S
DOP CALC LVOT STROKE VOLUME: 55.04 CM3
DOP CALC RVOT PEAK VEL: 0.52 M/S
DOP CALC RVOT VTI: 11.2 CM
DOP CALCLVOT PEAK VEL VTI: 15.9 CM
E WAVE DECELERATION TIME: 207.35 MSEC
E/A RATIO: 1.23
E/E' RATIO: 5.65 M/S
ECHO LV POSTERIOR WALL: 1.11 CM (ref 0.6–1.1)
EST. GFR  (NO RACE VARIABLE): >60 ML/MIN/1.73 M^2
EST. GFR  (NO RACE VARIABLE): >60 ML/MIN/1.73 M^2
FRACTIONAL SHORTENING: 15 % (ref 28–44)
GLUCOSE SERPL-MCNC: 100 MG/DL (ref 70–110)
GLUCOSE SERPL-MCNC: 100 MG/DL (ref 70–110)
HDLC SERPL-MCNC: 32 MG/DL (ref 40–75)
HDLC SERPL: 22.9 % (ref 20–50)
INTERVENTRICULAR SEPTUM: 0.87 CM (ref 0.6–1.1)
IVC DIAMETER: 2.23 CM
IVRT: 85.63 MSEC
LA MAJOR: 5.27 CM
LA MINOR: 5.63 CM
LA WIDTH: 3.5 CM
LDLC SERPL CALC-MCNC: 93.2 MG/DL (ref 63–159)
LEFT ATRIUM SIZE: 3.35 CM
LEFT ATRIUM VOLUME INDEX: 27.4 ML/M2
LEFT ATRIUM VOLUME: 54.26 CM3
LEFT INTERNAL DIMENSION IN SYSTOLE: 3.71 CM (ref 2.1–4)
LEFT VENTRICLE DIASTOLIC VOLUME INDEX: 43.05 ML/M2
LEFT VENTRICLE DIASTOLIC VOLUME: 85.24 ML
LEFT VENTRICLE MASS INDEX: 72 G/M2
LEFT VENTRICLE SYSTOLIC VOLUME INDEX: 29.5 ML/M2
LEFT VENTRICLE SYSTOLIC VOLUME: 58.35 ML
LEFT VENTRICULAR INTERNAL DIMENSION IN DIASTOLE: 4.35 CM (ref 3.5–6)
LEFT VENTRICULAR MASS: 143.14 G
LV LATERAL E/E' RATIO: 5.42 M/S
LV SEPTAL E/E' RATIO: 5.91 M/S
LVOT MG: 2.03 MMHG
LVOT MV: 0.68 CM/S
MV PEAK A VEL: 0.53 M/S
MV PEAK E VEL: 0.65 M/S
MV STENOSIS PRESSURE HALF TIME: 60.13 MS
MV VALVE AREA P 1/2 METHOD: 3.66 CM2
NONHDLC SERPL-MCNC: 108 MG/DL
OHS CV RV/LV RATIO: 0.82 CM
OHS LV EJECTION FRACTION SIMPSONS BIPLANE MOD: 43 %
PISA TR MAX VEL: 1.84 M/S
POCT GLUCOSE: 101 MG/DL (ref 70–110)
POCT GLUCOSE: 132 MG/DL (ref 70–110)
POTASSIUM SERPL-SCNC: 3.9 MMOL/L (ref 3.5–5.1)
POTASSIUM SERPL-SCNC: 3.9 MMOL/L (ref 3.5–5.1)
PROT SERPL-MCNC: 7 G/DL (ref 6–8.4)
PV MEAN GRADIENT: 1 MMHG
PV MV: 0.69 M/S
PV PEAK GRADIENT: 4 MMHG
PV PEAK VELOCITY: 0.96 M/S
RA MAJOR: 5.03 CM
RA PRESSURE ESTIMATED: 15 MMHG
RA WIDTH: 3.8 CM
RIGHT VENTRICULAR END-DIASTOLIC DIMENSION: 3.55 CM
RV TB RVSP: 17 MMHG
RV TISSUE DOPPLER FREE WALL SYSTOLIC VELOCITY 1 (APICAL 4 CHAMBER VIEW): 13.33 CM/S
SODIUM SERPL-SCNC: 139 MMOL/L (ref 136–145)
SODIUM SERPL-SCNC: 139 MMOL/L (ref 136–145)
STJ: 2.35 CM
TDI LATERAL: 0.12 M/S
TDI SEPTAL: 0.11 M/S
TDI: 0.12 M/S
TR MAX PG: 14 MMHG
TRICUSPID ANNULAR PLANE SYSTOLIC EXCURSION: 2 CM
TRIGL SERPL-MCNC: 74 MG/DL (ref 30–150)
TROPONIN I SERPL DL<=0.01 NG/ML-MCNC: 42.63 NG/ML (ref 0–0.03)
TROPONIN I SERPL DL<=0.01 NG/ML-MCNC: >50 NG/ML (ref 0–0.03)
TROPONIN I SERPL DL<=0.01 NG/ML-MCNC: >50 NG/ML (ref 0–0.03)
TV REST PULMONARY ARTERY PRESSURE: 29 MMHG
Z-SCORE OF LEFT VENTRICULAR DIMENSION IN END DIASTOLE: -2.73
Z-SCORE OF LEFT VENTRICULAR DIMENSION IN END SYSTOLE: 0.43

## 2024-06-02 PROCEDURE — 36415 COLL VENOUS BLD VENIPUNCTURE: CPT | Performed by: INTERNAL MEDICINE

## 2024-06-02 PROCEDURE — 84484 ASSAY OF TROPONIN QUANT: CPT | Mod: 91 | Performed by: STUDENT IN AN ORGANIZED HEALTH CARE EDUCATION/TRAINING PROGRAM

## 2024-06-02 PROCEDURE — C1769 GUIDE WIRE: HCPCS | Performed by: INTERNAL MEDICINE

## 2024-06-02 PROCEDURE — B2101ZZ FLUOROSCOPY OF SINGLE CORONARY ARTERY USING LOW OSMOLAR CONTRAST: ICD-10-PCS | Performed by: INTERNAL MEDICINE

## 2024-06-02 PROCEDURE — 99152 MOD SED SAME PHYS/QHP 5/>YRS: CPT | Mod: ,,, | Performed by: INTERNAL MEDICINE

## 2024-06-02 PROCEDURE — 93005 ELECTROCARDIOGRAM TRACING: CPT

## 2024-06-02 PROCEDURE — 93454 CORONARY ARTERY ANGIO S&I: CPT | Mod: 26,,, | Performed by: INTERNAL MEDICINE

## 2024-06-02 PROCEDURE — C1760 CLOSURE DEV, VASC: HCPCS | Performed by: INTERNAL MEDICINE

## 2024-06-02 PROCEDURE — 25000003 PHARM REV CODE 250: Performed by: INTERNAL MEDICINE

## 2024-06-02 PROCEDURE — 94761 N-INVAS EAR/PLS OXIMETRY MLT: CPT

## 2024-06-02 PROCEDURE — 25000003 PHARM REV CODE 250: Performed by: STUDENT IN AN ORGANIZED HEALTH CARE EDUCATION/TRAINING PROGRAM

## 2024-06-02 PROCEDURE — 93454 CORONARY ARTERY ANGIO S&I: CPT | Performed by: INTERNAL MEDICINE

## 2024-06-02 PROCEDURE — 99152 MOD SED SAME PHYS/QHP 5/>YRS: CPT | Performed by: INTERNAL MEDICINE

## 2024-06-02 PROCEDURE — 63600175 PHARM REV CODE 636 W HCPCS: Performed by: INTERNAL MEDICINE

## 2024-06-02 PROCEDURE — 25500020 PHARM REV CODE 255: Performed by: INTERNAL MEDICINE

## 2024-06-02 PROCEDURE — 20000000 HC ICU ROOM

## 2024-06-02 PROCEDURE — 63600175 PHARM REV CODE 636 W HCPCS: Mod: JZ,JG | Performed by: INTERNAL MEDICINE

## 2024-06-02 PROCEDURE — 4A023N7 MEASUREMENT OF CARDIAC SAMPLING AND PRESSURE, LEFT HEART, PERCUTANEOUS APPROACH: ICD-10-PCS | Performed by: INTERNAL MEDICINE

## 2024-06-02 PROCEDURE — 80061 LIPID PANEL: CPT | Performed by: NURSE PRACTITIONER

## 2024-06-02 PROCEDURE — C1894 INTRO/SHEATH, NON-LASER: HCPCS | Performed by: INTERNAL MEDICINE

## 2024-06-02 PROCEDURE — 63600175 PHARM REV CODE 636 W HCPCS: Performed by: STUDENT IN AN ORGANIZED HEALTH CARE EDUCATION/TRAINING PROGRAM

## 2024-06-02 PROCEDURE — 36415 COLL VENOUS BLD VENIPUNCTURE: CPT | Performed by: STUDENT IN AN ORGANIZED HEALTH CARE EDUCATION/TRAINING PROGRAM

## 2024-06-02 PROCEDURE — 25500020 PHARM REV CODE 255: Performed by: STUDENT IN AN ORGANIZED HEALTH CARE EDUCATION/TRAINING PROGRAM

## 2024-06-02 PROCEDURE — 27201423 OPTIME MED/SURG SUP & DEVICES STERILE SUPPLY: Performed by: INTERNAL MEDICINE

## 2024-06-02 PROCEDURE — 99233 SBSQ HOSP IP/OBS HIGH 50: CPT | Mod: 25,,, | Performed by: STUDENT IN AN ORGANIZED HEALTH CARE EDUCATION/TRAINING PROGRAM

## 2024-06-02 PROCEDURE — 93010 ELECTROCARDIOGRAM REPORT: CPT | Mod: ,,, | Performed by: STUDENT IN AN ORGANIZED HEALTH CARE EDUCATION/TRAINING PROGRAM

## 2024-06-02 PROCEDURE — 80053 COMPREHEN METABOLIC PANEL: CPT | Performed by: INTERNAL MEDICINE

## 2024-06-02 RX ORDER — FENTANYL CITRATE 50 UG/ML
INJECTION, SOLUTION INTRAMUSCULAR; INTRAVENOUS
Status: DISCONTINUED | OUTPATIENT
Start: 2024-06-02 | End: 2024-06-02 | Stop reason: HOSPADM

## 2024-06-02 RX ORDER — MIDAZOLAM HYDROCHLORIDE 1 MG/ML
INJECTION INTRAMUSCULAR; INTRAVENOUS
Status: DISCONTINUED | OUTPATIENT
Start: 2024-06-02 | End: 2024-06-02 | Stop reason: HOSPADM

## 2024-06-02 RX ORDER — ACETAMINOPHEN 325 MG/1
650 TABLET ORAL EVERY 4 HOURS PRN
Status: DISCONTINUED | OUTPATIENT
Start: 2024-06-02 | End: 2024-06-06 | Stop reason: HOSPADM

## 2024-06-02 RX ORDER — LIDOCAINE HYDROCHLORIDE 20 MG/ML
INJECTION, SOLUTION EPIDURAL; INFILTRATION; INTRACAUDAL; PERINEURAL
Status: DISCONTINUED | OUTPATIENT
Start: 2024-06-02 | End: 2024-06-02 | Stop reason: HOSPADM

## 2024-06-02 RX ORDER — SODIUM NITROPRUSSIDE 25 MG/ML
INJECTION INTRAVENOUS
Status: DISCONTINUED | OUTPATIENT
Start: 2024-06-02 | End: 2024-06-02 | Stop reason: HOSPADM

## 2024-06-02 RX ORDER — ONDANSETRON 8 MG/1
8 TABLET, ORALLY DISINTEGRATING ORAL EVERY 8 HOURS PRN
Status: DISCONTINUED | OUTPATIENT
Start: 2024-06-02 | End: 2024-06-06 | Stop reason: HOSPADM

## 2024-06-02 RX ORDER — FUROSEMIDE 10 MG/ML
40 INJECTION INTRAMUSCULAR; INTRAVENOUS ONCE
Status: COMPLETED | OUTPATIENT
Start: 2024-06-02 | End: 2024-06-02

## 2024-06-02 RX ORDER — SODIUM CHLORIDE 9 MG/ML
INJECTION, SOLUTION INTRAVENOUS CONTINUOUS
Status: ACTIVE | OUTPATIENT
Start: 2024-06-02 | End: 2024-06-02

## 2024-06-02 RX ORDER — OXYCODONE HYDROCHLORIDE 5 MG/1
10 TABLET ORAL EVERY 4 HOURS PRN
Status: DISCONTINUED | OUTPATIENT
Start: 2024-06-02 | End: 2024-06-06 | Stop reason: HOSPADM

## 2024-06-02 RX ORDER — HYDROCODONE BITARTRATE AND ACETAMINOPHEN 5; 325 MG/1; MG/1
1 TABLET ORAL EVERY 4 HOURS PRN
Status: DISCONTINUED | OUTPATIENT
Start: 2024-06-02 | End: 2024-06-06 | Stop reason: HOSPADM

## 2024-06-02 RX ORDER — NITROGLYCERIN 20 MG/100ML
0-200 INJECTION INTRAVENOUS CONTINUOUS
Status: DISCONTINUED | OUTPATIENT
Start: 2024-06-02 | End: 2024-06-03

## 2024-06-02 RX ORDER — NITROGLYCERIN 0.4 MG/1
0.4 TABLET SUBLINGUAL EVERY 5 MIN PRN
Status: DISCONTINUED | OUTPATIENT
Start: 2024-06-02 | End: 2024-06-06 | Stop reason: HOSPADM

## 2024-06-02 RX ORDER — ATROPINE SULFATE 0.1 MG/ML
0.5 INJECTION INTRAVENOUS
Status: DISCONTINUED | OUTPATIENT
Start: 2024-06-02 | End: 2024-06-06 | Stop reason: HOSPADM

## 2024-06-02 RX ORDER — DIPHENHYDRAMINE HYDROCHLORIDE 50 MG/ML
INJECTION INTRAMUSCULAR; INTRAVENOUS
Status: DISCONTINUED | OUTPATIENT
Start: 2024-06-02 | End: 2024-06-02 | Stop reason: HOSPADM

## 2024-06-02 RX ADMIN — SODIUM CHLORIDE: 9 INJECTION, SOLUTION INTRAVENOUS at 03:06

## 2024-06-02 RX ADMIN — ASPIRIN 81 MG: 81 TABLET, COATED ORAL at 08:06

## 2024-06-02 RX ADMIN — METOPROLOL TARTRATE 25 MG: 25 TABLET, FILM COATED ORAL at 08:06

## 2024-06-02 RX ADMIN — HYDROCODONE BITARTRATE AND ACETAMINOPHEN 1 TABLET: 5; 325 TABLET ORAL at 06:06

## 2024-06-02 RX ADMIN — ACETAMINOPHEN 650 MG: 325 TABLET ORAL at 08:06

## 2024-06-02 RX ADMIN — NITROGLYCERIN 10 MCG/MIN: 20 INJECTION INTRAVENOUS at 12:06

## 2024-06-02 RX ADMIN — ATORVASTATIN CALCIUM 80 MG: 40 TABLET, FILM COATED ORAL at 08:06

## 2024-06-02 RX ADMIN — LOSARTAN POTASSIUM 25 MG: 25 TABLET, FILM COATED ORAL at 08:06

## 2024-06-02 RX ADMIN — TIROFIBAN 0.15 MCG/KG/MIN: 5 INJECTION, SOLUTION INTRAVENOUS at 07:06

## 2024-06-02 RX ADMIN — CLOPIDOGREL BISULFATE 75 MG: 75 TABLET ORAL at 08:06

## 2024-06-02 RX ADMIN — FUROSEMIDE 40 MG: 10 INJECTION, SOLUTION INTRAMUSCULAR; INTRAVENOUS at 05:06

## 2024-06-02 RX ADMIN — OXYCODONE HYDROCHLORIDE 10 MG: 5 TABLET ORAL at 10:06

## 2024-06-02 RX ADMIN — NITROGLYCERIN 1 INCH: 20 OINTMENT TOPICAL at 06:06

## 2024-06-02 RX ADMIN — HUMAN ALBUMIN MICROSPHERES AND PERFLUTREN 0.11 MG: 10; .22 INJECTION, SOLUTION INTRAVENOUS at 12:06

## 2024-06-02 NOTE — BRIEF OP NOTE
<[facility] - Cath Lab ([facility])  Cardiology Department  Operative Note    SUMMARY     Date of Procedure: 6/2/2024     Procedure: Procedure(s) (LRB):  Left heart cath (Left)     Surgeons and Role:     * Óscar Mcnamara MD - Primary    Assisting Surgeon: None    Pre-Operative Diagnosis: NSTEMI (non-ST elevated myocardial infarction) [I21.4]    Post-Operative Diagnosis: Post-Op Diagnosis Codes:     * NSTEMI (non-ST elevated myocardial infarction) [I21.4]    Anesthesia: RN IV Sedation    Technical Procedures Used: selective coronary angiogram    Description of the Findings of the Procedure: patent LAD stent, some residual distal embolization, improved, gave IC nipride,  Continue aggrastat and prn pain meds    Significant Surgical Tasks Conducted by the Assistant(s), if Applicable: none    Complications: No    Estimated Blood Loss (EBL): < 50 cc           Implants: * No implants in log *    Specimens:   Specimen (24h ago, onward)      None                    Condition: Good    Disposition: PACU - hemodynamically stable.    Attestation: I was present and scrubbed for the entire procedure.

## 2024-06-02 NOTE — NURSING
Pt arrived from cath lab at 1430. Sang bag placed on right groin. Site WNL, no s/s bleeding or hematoma. Aggrastat to stay on until 2100 per Dr. Mcnamara. Nitro titrated for CP. VSS. Lasix given.

## 2024-06-02 NOTE — ASSESSMENT & PLAN NOTE
Per report, one stent placed to 100% occluded LAD  Educated on importance of treatment plan and follow up post acute MI  Cardiology managing  Plan for ASA, statin, plavix, ARB, and BB therapy  Continuous ICU hemodynamic monitoring overnight

## 2024-06-02 NOTE — PROGRESS NOTES
[facility] - Intensive Care ([facility])  Critical Care - Medicine  Progress Note    Patient Name: Wilfred Beach  MRN: [id number]  Admission Date: 6/1/2024  Hospital Length of Stay: 1 days  Code Status: Full Code  Attending Provider: Ena Daniels MD  Primary Care Provider: Johana, Primary Doctor   Principal Problem: ST elevation myocardial infarction involving left anterior descending (LAD) coronary artery    Subjective:     HPI:   28 year old male with elevated BMI but appears muscular/physically fit   Presented to ED on 6/1 for CP that started at rest  EKG with acute anterolateral ST elevation  Taken for emergent LHC, per nurse report he had one stent placed  CC team now consulted to assist with management    Hospital/ICU Course:   Alert  Denies chest pain    Review of Systems as per HPI otherwise negative    Objective:     Vital Signs (Most Recent):  Temp: 98.6 °F (37 °C) (06/02/24 0700)  Pulse: 76 (06/02/24 0807)  Resp: (!) 35 (06/02/24 0807)  BP: 130/73 (06/02/24 0807)  SpO2: 98 % (06/02/24 0807) Vital Signs (24h Range):  Temp:  [98.1 °F (36.7 °C)-99 °F (37.2 °C)] 98.6 °F (37 °C)  Pulse:  [66-99] 76  Resp:  [0-67] 35  SpO2:  [86 %-100 %] 98 %  BP: (109-163)/(56-99) 130/73     Weight: 89 kg (196 lb 3.2 oz)  Body mass index is 31.67 kg/m².      Intake/Output Summary (Last 24 hours) at 6/2/2024 0845  Last data filed at 6/2/2024 0600  Gross per 24 hour   Intake 1386.91 ml   Output 1000 ml   Net 386.91 ml       Physical Exam  GEN: NAD, alert  HEENT: normal  NECK; no JVD  CHEST: clear  HEART: regular no MGR  ABD: soft, NT  EXT: no edema    Vents:  Oxygen Concentration (%): 28 (06/01/24 1357)    Lines/Drains/Airways       Peripheral Intravenous Line  Duration                  Peripheral IV - Single Lumen 06/01/24 18 G Left Antecubital 1 day         Peripheral IV - Single Lumen 06/01/24 1356 18 G Anterior;Distal;Right Upper Arm <1 day                    Significant Labs:    CBC/Anemia Profile:  Recent Labs   Lab  06/01/24  1417   WBC 11.32   HGB 12.4*   HCT 39.5*      MCV 82   RDW 13.0        Chemistries:  Recent Labs   Lab 06/01/24  1417 06/02/24  0550    139  139   K 3.9 3.9  3.9    106  106   CO2 18* 21*  21*   BUN 9 7  7   CREATININE 1.1 0.8  0.8   CALCIUM 9.4 9.1  9.1   ALBUMIN 4.1 3.6   PROT 8.3 7.0   BILITOT 0.7 0.7   ALKPHOS 76 69   ALT 39 58*   AST 36 224*       Assessment/Plan:     Active Diagnoses:    Diagnosis Date Noted POA    PRINCIPAL PROBLEM:  ST elevation myocardial infarction involving left anterior descending (LAD) coronary artery [I21.02] 06/01/2024 Yes    Recreational drug use [F19.90] 06/01/2024 Yes      Problems Resolved During this Admission:     Acute MI  - Unclear if focal arterial disease or thrombus  - S/P DEStent  - Currently on aggrenox  - Plans per cardiology  - No critical care issues otherwise       Critical care was time spent personally by me on the following activities: development of treatment plan with patient or surrogate and bedside caregivers, discussions with consultants, evaluation of patient's response to treatment, examination of patient, ordering and performing treatments and interventions, ordering and review of laboratory studies, ordering and review of radiographic studies, pulse oximetry, re-evaluation of patient's condition.  This critical care time did not overlap with that of any other provider or involve time for any procedures.     Selwyn Kennedy MD  Critical Care - Medicine  '[facility] - Intensive Care ([facility])

## 2024-06-02 NOTE — HPI
28 year old male with elevated BMI but appears muscular/physically fit   Presented to ED on 6/1 for CP that started at work (reports avila, he was standing but not exerting)  EKG with acute anterolateral ST elevation  Taken for emergent LHC, per nurse report he had one stent placed  CC team now consulted to assist with management

## 2024-06-02 NOTE — HOSPITAL COURSE
Seen and examined after notification of consult by nursing staff  AAOx3, CP less (4/10), vomited earlier but denies current nausea.  Unsure but thinks he has uncles that had MIs at young age

## 2024-06-02 NOTE — PLAN OF CARE
Pt AAOx4. On room air. NSR on the monitor. Puncture site WNL. Aggrastat gtt infusing. PRN Norco given for chest pain. Voids per urinal; adequate UOP. 1 episode of emesis. PRN zofran given. NPO. Bed alarm in use, call light within reach POC reviewed with pt and family at bedside.

## 2024-06-02 NOTE — SUBJECTIVE & OBJECTIVE
No past medical history on file.    Past Surgical History:   Procedure Laterality Date    ORIF MANDIBULAR FRACTURE Bilateral 5/31/2021    Procedure: ORIF, FRACTURE, MANDIBLE;  Surgeon: Michael Newberry MD;  Location: [facility];  Service: Plastics;  Laterality: Bilateral;  Nasal intubation       Review of patient's allergies indicates:  No Known Allergies    Family History    None       Tobacco Use    Smoking status: Some Days     Types: Cigarettes    Smokeless tobacco: Never   Substance and Sexual Activity    Alcohol use: Never    Drug use: Never    Sexual activity: Not on file         Review of Systems   Constitutional:  Negative for chills and fever.   HENT:  Negative for congestion and sinus pressure.    Respiratory:  Negative for cough and shortness of breath.    Cardiovascular:  Positive for chest pain. Negative for palpitations and leg swelling.   Gastrointestinal: Negative.    Musculoskeletal: Negative.    Skin: Negative.    Neurological: Negative.    Psychiatric/Behavioral: Negative.       Objective:     Vital Signs (Most Recent):  Temp: 98.6 °F (37 °C) (06/01/24 1915)  Pulse: 71 (06/01/24 2045)  Resp: (!) 35 (06/01/24 2045)  BP: 127/71 (06/01/24 2045)  SpO2: 98 % (06/01/24 2045) Vital Signs (24h Range):  Temp:  [98.1 °F (36.7 °C)-98.6 °F (37 °C)] 98.6 °F (37 °C)  Pulse:  [71-99] 71  Resp:  [0-53] 35  SpO2:  [97 %-100 %] 98 %  BP: (122-163)/(65-93) 127/71     Weight: 89 kg (196 lb 3.2 oz)  Body mass index is 31.67 kg/m².      Intake/Output Summary (Last 24 hours) at 6/1/2024 2150  Last data filed at 6/1/2024 1800  Gross per 24 hour   Intake 106.22 ml   Output 400 ml   Net -293.78 ml        Physical Exam  Vitals and nursing note reviewed.   Constitutional:       General: He is sleeping. He is not in acute distress.     Appearance: Normal appearance. He is well-groomed. He is ill-appearing. He is not toxic-appearing.   HENT:      Head: Atraumatic.   Eyes:      Conjunctiva/sclera: Conjunctivae normal.   Neck:       Vascular: No JVD.   Cardiovascular:      Rate and Rhythm: Normal rate and regular rhythm.      Pulses:           Radial pulses are 2+ on the right side and 2+ on the left side.        Dorsalis pedis pulses are 2+ on the right side and 2+ on the left side.   Pulmonary:      Effort: Pulmonary effort is normal.      Breath sounds: Normal breath sounds.   Abdominal:      General: Bowel sounds are normal. There is no distension.      Palpations: Abdomen is soft.   Musculoskeletal:      Right lower leg: No edema.      Left lower leg: No edema.   Skin:     General: Skin is warm and dry.      Capillary Refill: Capillary refill takes less than 2 seconds.          Neurological:      Mental Status: He is oriented to person, place, and time and easily aroused.      GCS: GCS eye subscore is 4. GCS verbal subscore is 5. GCS motor subscore is 6.      Cranial Nerves: Cranial nerves 2-12 are intact.   Psychiatric:         Attention and Perception: Attention normal.         Mood and Affect: Mood normal.         Speech: Speech normal.         Behavior: Behavior normal. Behavior is cooperative.         Thought Content: Thought content normal.          Vents:  Oxygen Concentration (%): 28 (06/01/24 1357)    Lines/Drains/Airways       Peripheral Intravenous Line  Duration                  Peripheral IV - Single Lumen 06/01/24 1356 18 G Anterior;Distal;Right Upper Arm <1 day         Peripheral IV - Single Lumen 06/01/24 18 G Left Antecubital <1 day                    Significant Labs:    CBC/Anemia Profile:  Recent Labs   Lab 06/01/24  1417   WBC 11.32   HGB 12.4*   HCT 39.5*      MCV 82   RDW 13.0        Chemistries:  Recent Labs   Lab 06/01/24  1417      K 3.9      CO2 18*   BUN 9   CREATININE 1.1   CALCIUM 9.4   ALBUMIN 4.1   PROT 8.3   BILITOT 0.7   ALKPHOS 76   ALT 39   AST 36            Component Ref Range & Units 06/01/24 1814   Benzodiazepines Negative Presumptive Positive Abnormal    Methadone metabolites  Negative Negative   Cocaine (Metab.) Negative Negative   Opiate Scrn, Ur Negative Negative   Barbiturate Screen, Ur Negative Negative   Amphetamine Screen, Ur Negative Presumptive Positive Abnormal    THC Negative Presumptive Positive Abnormal    Phencyclidine Negative Negative   Creatinine, Urine 23.0 - 375.0 mg/dL 72.1          Troponin:   Recent Labs   Lab 06/01/24  1417   TROPONINI <0.006     All pertinent labs within the past 24 hours have been reviewed.    Significant Imaging:   I have reviewed all pertinent imaging results/findings within the past 24 hours.

## 2024-06-02 NOTE — ASSESSMENT & PLAN NOTE
Endorses occasional marijuana smoking  Discussed positive drug screen with amphetamines which surprised him   We discussed risks with tainted marijuana and more specifically recommendation to avoid amphetamine use now with cardiac issue

## 2024-06-03 DIAGNOSIS — I21.02 ST ELEVATION MYOCARDIAL INFARCTION INVOLVING LEFT ANTERIOR DESCENDING (LAD) CORONARY ARTERY: Primary | ICD-10-CM

## 2024-06-03 LAB
ALBUMIN SERPL BCP-MCNC: 3.6 G/DL (ref 3.5–5.2)
ALP SERPL-CCNC: 71 U/L (ref 55–135)
ALT SERPL W/O P-5'-P-CCNC: 48 U/L (ref 10–44)
ANION GAP SERPL CALC-SCNC: 10 MMOL/L (ref 8–16)
ANISOCYTOSIS BLD QL SMEAR: SLIGHT
AST SERPL-CCNC: 109 U/L (ref 10–40)
BASOPHILS # BLD AUTO: 0.02 K/UL (ref 0–0.2)
BASOPHILS NFR BLD: 0.1 % (ref 0–1.9)
BILIRUB SERPL-MCNC: 1.5 MG/DL (ref 0.1–1)
BUN SERPL-MCNC: 7 MG/DL (ref 6–20)
CALCIUM SERPL-MCNC: 9.3 MG/DL (ref 8.7–10.5)
CHLORIDE SERPL-SCNC: 102 MMOL/L (ref 95–110)
CO2 SERPL-SCNC: 23 MMOL/L (ref 23–29)
CREAT SERPL-MCNC: 0.7 MG/DL (ref 0.5–1.4)
DIFFERENTIAL METHOD BLD: ABNORMAL
EOSINOPHIL # BLD AUTO: 0 K/UL (ref 0–0.5)
EOSINOPHIL NFR BLD: 0 % (ref 0–8)
ERYTHROCYTE [DISTWIDTH] IN BLOOD BY AUTOMATED COUNT: 12.9 % (ref 11.5–14.5)
EST. GFR  (NO RACE VARIABLE): >60 ML/MIN/1.73 M^2
GLUCOSE SERPL-MCNC: 106 MG/DL (ref 70–110)
HCT VFR BLD AUTO: 35.3 % (ref 40–54)
HGB BLD-MCNC: 11.1 G/DL (ref 14–18)
IMM GRANULOCYTES # BLD AUTO: 0.07 K/UL (ref 0–0.04)
IMM GRANULOCYTES NFR BLD AUTO: 0.4 % (ref 0–0.5)
LYMPHOCYTES # BLD AUTO: 1.3 K/UL (ref 1–4.8)
LYMPHOCYTES NFR BLD: 7.5 % (ref 18–48)
MAGNESIUM SERPL-MCNC: 1.9 MG/DL (ref 1.6–2.6)
MCH RBC QN AUTO: 25.8 PG (ref 27–31)
MCHC RBC AUTO-ENTMCNC: 31.4 G/DL (ref 32–36)
MCV RBC AUTO: 82 FL (ref 82–98)
MONOCYTES # BLD AUTO: 2.7 K/UL (ref 0.3–1)
MONOCYTES NFR BLD: 15.4 % (ref 4–15)
NEUTROPHILS # BLD AUTO: 13.3 K/UL (ref 1.8–7.7)
NEUTROPHILS NFR BLD: 76.6 % (ref 38–73)
NRBC BLD-RTO: 0 /100 WBC
OHS QRS DURATION: 78 MS
OHS QRS DURATION: 82 MS
OHS QRS DURATION: 82 MS
OHS QTC CALCULATION: 415 MS
OHS QTC CALCULATION: 428 MS
OHS QTC CALCULATION: 459 MS
PHOSPHATE SERPL-MCNC: 2.5 MG/DL (ref 2.7–4.5)
PLATELET # BLD AUTO: 321 K/UL (ref 150–450)
PLATELET BLD QL SMEAR: ABNORMAL
PMV BLD AUTO: 9 FL (ref 9.2–12.9)
POTASSIUM SERPL-SCNC: 3.4 MMOL/L (ref 3.5–5.1)
PROT SERPL-MCNC: 7.4 G/DL (ref 6–8.4)
RBC # BLD AUTO: 4.3 M/UL (ref 4.6–6.2)
SODIUM SERPL-SCNC: 135 MMOL/L (ref 136–145)
SPHEROCYTES BLD QL SMEAR: ABNORMAL
TROPONIN I SERPL DL<=0.01 NG/ML-MCNC: 25.95 NG/ML (ref 0–0.03)
TROPONIN I SERPL DL<=0.01 NG/ML-MCNC: 27.71 NG/ML (ref 0–0.03)
TROPONIN I SERPL DL<=0.01 NG/ML-MCNC: 31.24 NG/ML (ref 0–0.03)
TROPONIN I SERPL DL<=0.01 NG/ML-MCNC: 35.17 NG/ML (ref 0–0.03)
TROPONIN I SERPL DL<=0.01 NG/ML-MCNC: 35.48 NG/ML (ref 0–0.03)
TROPONIN I SERPL DL<=0.01 NG/ML-MCNC: 39.62 NG/ML (ref 0–0.03)
WBC # BLD AUTO: 17.4 K/UL (ref 3.9–12.7)

## 2024-06-03 PROCEDURE — 36415 COLL VENOUS BLD VENIPUNCTURE: CPT | Performed by: INTERNAL MEDICINE

## 2024-06-03 PROCEDURE — 99233 SBSQ HOSP IP/OBS HIGH 50: CPT | Mod: ,,, | Performed by: INTERNAL MEDICINE

## 2024-06-03 PROCEDURE — 84484 ASSAY OF TROPONIN QUANT: CPT | Mod: 91 | Performed by: STUDENT IN AN ORGANIZED HEALTH CARE EDUCATION/TRAINING PROGRAM

## 2024-06-03 PROCEDURE — 36415 COLL VENOUS BLD VENIPUNCTURE: CPT | Mod: XB | Performed by: NURSE PRACTITIONER

## 2024-06-03 PROCEDURE — 36415 COLL VENOUS BLD VENIPUNCTURE: CPT | Mod: XB | Performed by: STUDENT IN AN ORGANIZED HEALTH CARE EDUCATION/TRAINING PROGRAM

## 2024-06-03 PROCEDURE — 80053 COMPREHEN METABOLIC PANEL: CPT | Performed by: INTERNAL MEDICINE

## 2024-06-03 PROCEDURE — 25000003 PHARM REV CODE 250: Performed by: INTERNAL MEDICINE

## 2024-06-03 PROCEDURE — 85025 COMPLETE CBC W/AUTO DIFF WBC: CPT | Performed by: NURSE PRACTITIONER

## 2024-06-03 PROCEDURE — 25000242 PHARM REV CODE 250 ALT 637 W/ HCPCS: Performed by: INTERNAL MEDICINE

## 2024-06-03 PROCEDURE — 25000003 PHARM REV CODE 250: Performed by: STUDENT IN AN ORGANIZED HEALTH CARE EDUCATION/TRAINING PROGRAM

## 2024-06-03 PROCEDURE — 20000000 HC ICU ROOM

## 2024-06-03 PROCEDURE — 84100 ASSAY OF PHOSPHORUS: CPT | Performed by: NURSE PRACTITIONER

## 2024-06-03 PROCEDURE — 83735 ASSAY OF MAGNESIUM: CPT | Performed by: NURSE PRACTITIONER

## 2024-06-03 PROCEDURE — 84484 ASSAY OF TROPONIN QUANT: CPT | Mod: 91 | Performed by: NURSE PRACTITIONER

## 2024-06-03 RX ORDER — SODIUM,POTASSIUM PHOSPHATES 280-250MG
2 POWDER IN PACKET (EA) ORAL ONCE
Status: COMPLETED | OUTPATIENT
Start: 2024-06-03 | End: 2024-06-03

## 2024-06-03 RX ORDER — PRASUGREL 10 MG/1
10 TABLET, FILM COATED ORAL DAILY
Qty: 30 TABLET | Refills: 11 | Status: SHIPPED | OUTPATIENT
Start: 2024-06-03 | End: 2024-06-06

## 2024-06-03 RX ORDER — POTASSIUM CHLORIDE 20 MEQ/1
40 TABLET, EXTENDED RELEASE ORAL ONCE
Status: COMPLETED | OUTPATIENT
Start: 2024-06-03 | End: 2024-06-03

## 2024-06-03 RX ORDER — MUPIROCIN 20 MG/G
OINTMENT TOPICAL 2 TIMES DAILY
Status: DISCONTINUED | OUTPATIENT
Start: 2024-06-03 | End: 2024-06-06 | Stop reason: HOSPADM

## 2024-06-03 RX ADMIN — ATORVASTATIN CALCIUM 80 MG: 40 TABLET, FILM COATED ORAL at 09:06

## 2024-06-03 RX ADMIN — ASPIRIN 81 MG: 81 TABLET, COATED ORAL at 08:06

## 2024-06-03 RX ADMIN — METOPROLOL TARTRATE 25 MG: 25 TABLET, FILM COATED ORAL at 09:06

## 2024-06-03 RX ADMIN — Medication 2 PACKET: at 09:06

## 2024-06-03 RX ADMIN — LOSARTAN POTASSIUM 25 MG: 25 TABLET, FILM COATED ORAL at 08:06

## 2024-06-03 RX ADMIN — NITROGLYCERIN 0.4 MG: 0.4 TABLET SUBLINGUAL at 11:06

## 2024-06-03 RX ADMIN — CLOPIDOGREL BISULFATE 75 MG: 75 TABLET ORAL at 08:06

## 2024-06-03 RX ADMIN — POTASSIUM CHLORIDE 40 MEQ: 1500 TABLET, EXTENDED RELEASE ORAL at 09:06

## 2024-06-03 RX ADMIN — METOPROLOL TARTRATE 25 MG: 25 TABLET, FILM COATED ORAL at 08:06

## 2024-06-03 NOTE — ASSESSMENT & PLAN NOTE
Per report, one stent placed to 100% occluded LAD  Educated on importance of treatment plan and follow up post acute MI  Cardiology managing  Plan for ASA, statin, RB, and BB therapy  - cardiology planning switch to prasugrel

## 2024-06-03 NOTE — HOSPITAL COURSE
6/2/24  underwent emergent LHC on yesterday for anterior STEMI, PCI to ostial LAD, had clot burden, on Aggrastat infusion.  Reports chest pain similar to what brought him in, but not as intense, 6/10 pain.  EKG post PCI with resolutions ST elevations.  EKG this morning with ST elevation in anterior leads, taking back to cath lab for evaluation,   Found to have distal clot burden.  Aggrastat continued, placed on tridil gtt.     6/3/24 pt seen and examined today sitting up in bed feels ok, denies any CP or SOB at this time. Labs reviewed, chart reviewed    6/4/24 pt seen and examined today, sitting up in bed. Feels ok today, reports improvement in chest pains denies any SOB at this time. Tele still with ST elevations. Labs reviewed, troponin trending down, Mg 1.8, Crt 0.09, K 3.4    6/5/24: Pt seen and examined in ICU this am.  Felt better. No angina or CHF sxs. HR went up last night with ambulation in room.  Now on Effient.  Beta-blocker increased yesterday.  Chart/labs reviewed.  Still with persistent ST elevation on monitor due to his severe thrombotic MI.    6/6/24 Pt seen and examined today sitting up in bed feels ok still with some chest discomfort on the left side. Denies any SOB or palpitations. Walked with RN yesterday and HR was stable. Labs reviewed, chart reviewed

## 2024-06-03 NOTE — PROGRESS NOTES
[facility] ([facility])  Cardiology  Progress Note    Patient Name: Wilfred Beach  MRN: [id number]  Admission Date: 6/1/2024  Hospital Length of Stay: 1 days  Code Status: Full Code   Attending Physician: Ena Daniels MD   Primary Care Physician: Johana, Primary Doctor  Expected Discharge Date:   Principal Problem:ST elevation myocardial infarction involving left anterior descending (LAD) coronary artery    Subjective:     Hospital Course:    6/2/24  underwent emergent LHC on yesterday for anterior STEMI, PCI to ostial LAD, had clot burden, on Aggrastat infusion.  Reports chest pain similar to what brought him in, but not as intense, 6/10 pain.  EKG post PCI with resolutions ST elevations.  EKG this morning with ST elevation in anterior leads, taking back to cath lab for evaluation,   Found to have distal clot burden.  Aggrastat continued, placed on tridil gtt.       Review of Systems   Constitutional: Negative for diaphoresis, malaise/fatigue, weight gain and weight loss.   HENT:  Negative for congestion and nosebleeds.    Cardiovascular:  Positive for chest pain. Negative for claudication, cyanosis, dyspnea on exertion, irregular heartbeat, leg swelling, near-syncope, orthopnea, palpitations, paroxysmal nocturnal dyspnea and syncope.   Respiratory:  Positive for shortness of breath. Negative for cough, hemoptysis, sleep disturbances due to breathing, snoring, sputum production and wheezing.    Hematologic/Lymphatic: Negative for bleeding problem. Does not bruise/bleed easily.   Skin:  Negative for rash.   Musculoskeletal:  Negative for arthritis, back pain, falls, joint pain, muscle cramps and muscle weakness.   Gastrointestinal:  Negative for abdominal pain, constipation, diarrhea, heartburn, hematemesis, hematochezia, melena, nausea and vomiting.   Genitourinary:  Negative for dysuria, hematuria and nocturia.   Neurological:  Negative for excessive daytime sleepiness, dizziness, headaches,  light-headedness, loss of balance, numbness, vertigo and weakness.     Objective:     Vital Signs (Most Recent):  Temp: 98.7 °F (37.1 °C) (06/02/24 1500)  Pulse: 110 (06/02/24 1800)  Resp: (!) 44 (06/02/24 1800)  BP: (!) 143/77 (06/02/24 1800)  SpO2: 97 % (06/02/24 1800) Vital Signs (24h Range):  Temp:  [98.1 °F (36.7 °C)-99 °F (37.2 °C)] 98.7 °F (37.1 °C)  Pulse:  [] 110  Resp:  [0-67] 44  SpO2:  [76 %-100 %] 97 %  BP: (109-174)/() 143/77     Weight: 89 kg (196 lb 3.4 oz)  Body mass index is 31.67 kg/m².     SpO2: 97 %         Intake/Output Summary (Last 24 hours) at 6/2/2024 1900  Last data filed at 6/2/2024 1800  Gross per 24 hour   Intake 1639.78 ml   Output 1800 ml   Net -160.22 ml       Lines/Drains/Airways       Peripheral Intravenous Line  Duration                  Peripheral IV - Single Lumen 06/01/24 1356 18 G Anterior;Distal;Right Upper Arm 1 day         Peripheral IV - Single Lumen 06/01/24 18 G Left Antecubital 1 day                       Physical Exam  Vitals and nursing note reviewed.   Constitutional:       Appearance: Normal appearance. He is well-developed.   HENT:      Head: Normocephalic.      Mouth/Throat:      Mouth: Mucous membranes are moist.   Neck:      Vascular: No carotid bruit or JVD.   Cardiovascular:      Rate and Rhythm: Normal rate and regular rhythm.      Pulses: Normal pulses.      Heart sounds: Normal heart sounds. No murmur heard.     No friction rub.   Pulmonary:      Effort: Pulmonary effort is normal. No respiratory distress.      Breath sounds: Normal breath sounds. No wheezing or rales.   Abdominal:      General: Bowel sounds are normal. There is no distension.      Palpations: Abdomen is soft.      Tenderness: There is no abdominal tenderness. There is no guarding.   Musculoskeletal:         General: No swelling or tenderness.      Cervical back: Neck supple. No tenderness.      Right lower leg: No edema.      Left lower leg: No edema.   Skin:     General: Skin  "is warm and dry.      Capillary Refill: Capillary refill takes less than 2 seconds.      Findings: No rash.   Neurological:      General: No focal deficit present.      Mental Status: He is alert and oriented to person, place, and time.   Psychiatric:         Mood and Affect: Mood normal.         Behavior: Behavior normal.         Thought Content: Thought content normal.            Significant Labs: BMP:   Recent Labs   Lab 06/01/24  1417 06/02/24  0550   * 100  100    139  139   K 3.9 3.9  3.9    106  106   CO2 18* 21*  21*   BUN 9 7  7   CREATININE 1.1 0.8  0.8   CALCIUM 9.4 9.1  9.1   , CMP   Recent Labs   Lab 06/01/24  1417 06/02/24  0550    139  139   K 3.9 3.9  3.9    106  106   CO2 18* 21*  21*   * 100  100   BUN 9 7  7   CREATININE 1.1 0.8  0.8   CALCIUM 9.4 9.1  9.1   PROT 8.3 7.0   ALBUMIN 4.1 3.6   BILITOT 0.7 0.7   ALKPHOS 76 69   AST 36 224*   ALT 39 58*   ANIONGAP 18* 12  12   , CBC   Recent Labs   Lab 06/01/24  1417   WBC 11.32   HGB 12.4*   HCT 39.5*      , INR No results for input(s): "INR", "PROTIME" in the last 48 hours., Lipid Panel   Recent Labs   Lab 06/02/24  0550   CHOL 140   HDL 32*   LDLCALC 93.2   TRIG 74   CHOLHDL 22.9   , and Troponin   Recent Labs   Lab 06/01/24  1417 06/02/24  0819 06/02/24  1433   TROPONINI <0.006 >50.000* >50.000*       Significant Imaging: Echocardiogram: 2D echo with color flow doppler: No results found for this or any previous visit. and Transthoracic echo (TTE) complete (Cupid Only):   Results for orders placed or performed during the hospital encounter of 06/01/24   Echo   Result Value Ref Range    BSA 2.04 m2    Left Atrium Major Axis 5.27 cm    Left Atrium Minor Axis 5.63 cm    RA Major Axis 5.03 cm    LV Diastolic Volume 85.24 mL    LV Systolic Volume 58.35 mL    MV Peak A Daniel 0.53 m/s    MV stenosis pressure 1/2 time 60.13 ms    TR Max Daniel 1.84 m/s    MV Peak E Daniel 0.65 m/s    PV mean gradient 1 " mmHg    RVOT peak VTI 11.2 cm    RVOT peak li 0.52 m/s    Ao VTI 24.60 cm    Ao peak li 1.42 m/s    LVOT peak VTI 15.90 cm    LVOT peak li 0.89 m/s    LVOT diameter 2.10 cm    IVRT 85.63 msec    E wave deceleration time 207.35 msec    PV peak gradient 4 mmHg    AV mean gradient 4 mmHg    RV S' 13.33 cm/s    TAPSE 2.00 cm    RVDD 3.55 cm    LA size 3.35 cm    Ascending aorta 2.69 cm    STJ 2.35 cm    LVIDs 3.71 2.1 - 4.0 cm    Posterior Wall 1.11 (A) 0.6 - 1.1 cm    IVS 0.87 0.6 - 1.1 cm    LVIDd 4.35 3.5 - 6.0 cm    PV PEAK VELOCITY 0.96 m/s    TDI LATERAL 0.12 m/s    Pulmonary Valve Mean Velocity 0.69 m/s    Left Ventricular Outflow Tract Mean Gradient 2.03 mmHg    Left Ventricular Outflow Tract Mean Velocity 0.68 cm/s    Pedro's Biplane MOD Ejection Fraction 43 %    IVC diameter 2.23 cm    TDI SEPTAL 0.11 m/s    LV LATERAL E/E' RATIO 5.42 m/s    LV SEPTAL E/E' RATIO 5.91 m/s    RV/LV Ratio 0.82 cm    FS 15 (A) 28 - 44 %    LV mass 143.14 g    ZLVIDD -2.73     ZLVIDS 0.43     Left Ventricle Relative Wall Thickness 0.51 cm    AV valve area 2.24 cm²    AV Velocity Ratio 0.63     AV index (prosthetic) 0.65     MV valve area p 1/2 method 3.66 cm2    E/A ratio 1.23     Mean e' 0.12 m/s    LVOT area 3.5 cm2    LVOT stroke volume 55.04 cm3    AV peak gradient 8 mmHg    E/E' ratio 5.65 m/s    LV Systolic Volume Index 29.5 mL/m2    LV Diastolic Volume Index 43.05 mL/m2    LV Mass Index 72 g/m2    Triscuspid Valve Regurgitation Peak Gradient 14 mmHg    SHIRAZ by Velocity Ratio 2.17 cm²    LA Volume Index 27.4 mL/m2    LA volume 54.26 cm3    LA WIDTH 3.5 cm    RA Width 3.8 cm    TV resting pulmonary artery pressure 29 mmHg    RV TB RVSP 17 mmHg    Est. RA pres 15 mmHg    Narrative      Left Ventricle: The left ventricle is normal in size. Normal wall   thickness. There is concentric remodeling. Regional wall motion   abnormalities and Global hypokinesis present. There is mildly reduced   systolic function with a visually  estimated ejection fraction of 40 - 45%.   Biplane (2D) method of discs ejection fraction is 43%. There is normal   diastolic function.    Right Ventricle: Normal right ventricular cavity size. Wall thickness   is normal. Systolic function is normal.    Pulmonary Artery: The estimated pulmonary artery systolic pressure is   29 mmHg.    IVC/SVC: Elevated venous pressure at 15 mmHg.       Assessment and Plan:    Patient comes in with acute anterior STEMI s/p PCI to ostial LAD.  Went back to cath lab  due to ongoing chest pain and recurrence of anterior SKYLAR  with distal LAD clot seen.  Continuing Aggrastat.  Placed on  Tridil infusion.      * ST elevation myocardial infarction involving left anterior descending (LAD) coronary artery   Family history with mom with premature CAD at 48 yo, history of stents and CABG   Patient presented with anterior MI, s/p PCI to ostial LAD   Continue Aggrastat   Continue Tridil infusion, can wean  and chest pain improves  Echo with EF 40-45%,  akinetic apex      Recreational drug use   UDS positive for amphetamines, benzos, marijuana   Cessation strongly encouraged        VTE Risk Mitigation (From admission, onward)      None            Ena Daniels MD  Cardiology  [facility] ([facility])

## 2024-06-03 NOTE — PROGRESS NOTES
[facility] ([facility])  Cardiology  Progress Note    Patient Name: Wilfred Beach  MRN: [id number]  Admission Date: 6/1/2024  Hospital Length of Stay: 2 days  Code Status: Full Code   Attending Physician: Ena Daniels MD   Primary Care Physician: Johana, Primary Doctor  Expected Discharge Date:   Principal Problem:ST elevation myocardial infarction involving left anterior descending (LAD) coronary artery    Subjective:   Patient is a 28 y.o. male presents with CP while getting haircut. Pt without any significant PMhx. 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 EKG with acute anterolateral MI. To cath lab     CRF- mother with CAD/CABG and MI  Hospital Course:    6/2/24  underwent emergent LHC on yesterday for anterior STEMI, PCI to ostial LAD, had clot burden, on Aggrastat infusion.  Reports chest pain similar to what brought him in, but not as intense, 6/10 pain.  EKG post PCI with resolutions ST elevations.  EKG this morning with ST elevation in anterior leads, taking back to cath lab for evaluation,   Found to have distal clot burden.  Aggrastat continued, placed on tridil gtt.     6/3/24 pt seen and examined today sitting up in bed feels ok, denies any CP or SOB at this time. Labs reviewed, chart reviewed        Review of Systems   Constitutional: Negative.   HENT: Negative.     Eyes: Negative.    Cardiovascular: Negative.    Respiratory: Negative.     Skin: Negative.    Musculoskeletal: Negative.    Gastrointestinal: Negative.    Genitourinary: Negative.    Neurological: Negative.    Psychiatric/Behavioral: Negative.       Objective:     Vital Signs (Most Recent):  Temp: 97.7 °F (36.5 °C) (06/03/24 0305)  Pulse: 110 (06/03/24 1000)  Resp: (!) 35 (06/03/24 1000)  BP: (!) 156/93 (06/03/24 0945)  SpO2: 98 % (06/03/24 1000) Vital Signs (24h Range):  Temp:  [97.7 °F (36.5 °C)-99.4 °F (37.4 °C)] 97.7 °F (36.5 °C)  Pulse:  [] 110  Resp:  [0-64] 35  SpO2:  [94 %-99 %] 98 %  BP: (115-163)/()  156/93     Weight: 90.8 kg (200 lb 2.8 oz)  Body mass index is 32.31 kg/m².     SpO2: 98 %         Intake/Output Summary (Last 24 hours) at 6/3/2024 1318  Last data filed at 6/3/2024 0600  Gross per 24 hour   Intake 1098.73 ml   Output 2575 ml   Net -1476.27 ml       Lines/Drains/Airways       Peripheral Intravenous Line  Duration                  Peripheral IV - Single Lumen 06/01/24 18 G Left Antecubital 2 days         Peripheral IV - Single Lumen 06/01/24 1356 18 G Anterior;Distal;Right Upper Arm 1 day                       Physical Exam  Vitals and nursing note reviewed.   Constitutional:       Appearance: Normal appearance.   HENT:      Head: Normocephalic and atraumatic.   Eyes:      General:         Right eye: No discharge.         Left eye: No discharge.      Pupils: Pupils are equal, round, and reactive to light.   Cardiovascular:      Rate and Rhythm: Regular rhythm. Tachycardia present.      Heart sounds: S1 normal and S2 normal. No murmur heard.     No friction rub.   Pulmonary:      Effort: Pulmonary effort is normal. No respiratory distress.      Breath sounds: Normal breath sounds. No rales.   Abdominal:      Palpations: Abdomen is soft.      Tenderness: There is no abdominal tenderness.   Musculoskeletal:      Cervical back: Neck supple.      Right lower leg: No edema.      Left lower leg: No edema.   Skin:     General: Skin is warm and dry.   Neurological:      General: No focal deficit present.      Mental Status: He is alert and oriented to person, place, and time.   Psychiatric:         Mood and Affect: Mood normal.         Behavior: Behavior normal.         Thought Content: Thought content normal.            Significant Labs: BMP:   Recent Labs   Lab 06/01/24  1417 06/02/24  0550 06/03/24  0444   * 100  100 106    139  139 135*   K 3.9 3.9  3.9 3.4*    106  106 102   CO2 18* 21*  21* 23   BUN 9 7  7 7   CREATININE 1.1 0.8  0.8 0.7   CALCIUM 9.4 9.1  9.1 9.3   MG  --    "--  1.9   , CMP   Recent Labs   Lab 06/01/24  1417 06/02/24  0550 06/03/24  0444    139  139 135*   K 3.9 3.9  3.9 3.4*    106  106 102   CO2 18* 21*  21* 23   * 100  100 106   BUN 9 7  7 7   CREATININE 1.1 0.8  0.8 0.7   CALCIUM 9.4 9.1  9.1 9.3   PROT 8.3 7.0 7.4   ALBUMIN 4.1 3.6 3.6   BILITOT 0.7 0.7 1.5*   ALKPHOS 76 69 71   AST 36 224* 109*   ALT 39 58* 48*   ANIONGAP 18* 12  12 10   , CBC   Recent Labs   Lab 06/01/24  1417 06/03/24  0444   WBC 11.32 17.40*   HGB 12.4* 11.1*   HCT 39.5* 35.3*    321   , INR No results for input(s): "INR", "PROTIME" in the last 48 hours., Lipid Panel   Recent Labs   Lab 06/02/24  0550   CHOL 140   HDL 32*   LDLCALC 93.2   TRIG 74   CHOLHDL 22.9   , Troponin   Recent Labs   Lab 06/03/24  0444 06/03/24  0746 06/03/24  1225   TROPONINI 35.172* 35.480* 31.244*   , and All pertinent lab results from the last 24 hours have been reviewed.    Significant Imaging: Cardiac Cath: reviewed, Echocardiogram: Transthoracic echo (TTE) complete (Cupid Only):   Results for orders placed or performed during the hospital encounter of 06/01/24   Echo   Result Value Ref Range    BSA 2.04 m2    Left Atrium Major Axis 5.27 cm    Left Atrium Minor Axis 5.63 cm    RA Major Axis 5.03 cm    LV Diastolic Volume 85.24 mL    LV Systolic Volume 58.35 mL    MV Peak A Daniel 0.53 m/s    MV stenosis pressure 1/2 time 60.13 ms    TR Max Daniel 1.84 m/s    MV Peak E Daniel 0.65 m/s    PV mean gradient 1 mmHg    RVOT peak VTI 11.2 cm    RVOT peak daniel 0.52 m/s    Ao VTI 24.60 cm    Ao peak daniel 1.42 m/s    LVOT peak VTI 15.90 cm    LVOT peak daniel 0.89 m/s    LVOT diameter 2.10 cm    IVRT 85.63 msec    E wave deceleration time 207.35 msec    PV peak gradient 4 mmHg    AV mean gradient 4 mmHg    RV S' 13.33 cm/s    TAPSE 2.00 cm    RVDD 3.55 cm    LA size 3.35 cm    Ascending aorta 2.69 cm    STJ 2.35 cm    LVIDs 3.71 2.1 - 4.0 cm    Posterior Wall 1.11 (A) 0.6 - 1.1 cm    IVS 0.87 0.6 - 1.1 cm "    LVIDd 4.35 3.5 - 6.0 cm    PV PEAK VELOCITY 0.96 m/s    TDI LATERAL 0.12 m/s    Pulmonary Valve Mean Velocity 0.69 m/s    Left Ventricular Outflow Tract Mean Gradient 2.03 mmHg    Left Ventricular Outflow Tract Mean Velocity 0.68 cm/s    Pedro's Biplane MOD Ejection Fraction 43 %    IVC diameter 2.23 cm    TDI SEPTAL 0.11 m/s    LV LATERAL E/E' RATIO 5.42 m/s    LV SEPTAL E/E' RATIO 5.91 m/s    RV/LV Ratio 0.82 cm    FS 15 (A) 28 - 44 %    LV mass 143.14 g    ZLVIDD -2.73     ZLVIDS 0.43     Left Ventricle Relative Wall Thickness 0.51 cm    AV valve area 2.24 cm²    AV Velocity Ratio 0.63     AV index (prosthetic) 0.65     MV valve area p 1/2 method 3.66 cm2    E/A ratio 1.23     Mean e' 0.12 m/s    LVOT area 3.5 cm2    LVOT stroke volume 55.04 cm3    AV peak gradient 8 mmHg    E/E' ratio 5.65 m/s    LV Systolic Volume Index 29.5 mL/m2    LV Diastolic Volume Index 43.05 mL/m2    LV Mass Index 72 g/m2    Triscuspid Valve Regurgitation Peak Gradient 14 mmHg    SHIRAZ by Velocity Ratio 2.17 cm²    LA Volume Index 27.4 mL/m2    LA volume 54.26 cm3    LA WIDTH 3.5 cm    RA Width 3.8 cm    TV resting pulmonary artery pressure 29 mmHg    RV TB RVSP 17 mmHg    Est. RA pres 15 mmHg    Narrative      Left Ventricle: The left ventricle is normal in size. Normal wall   thickness. There is concentric remodeling. Regional wall motion   abnormalities and Global hypokinesis present. There is mildly reduced   systolic function with a visually estimated ejection fraction of 40 - 45%.   Biplane (2D) method of discs ejection fraction is 43%. There is normal   diastolic function.    Right Ventricle: Normal right ventricular cavity size. Wall thickness   is normal. Systolic function is normal.    Pulmonary Artery: The estimated pulmonary artery systolic pressure is   29 mmHg.    IVC/SVC: Elevated venous pressure at 15 mmHg.     , EKG: reviewed, Stress Test: reviewed, and X-Ray: CXR: X-Ray Chest 1 View (CXR): No results found for this  visit on 06/01/24.  Assessment and Plan:       * ST elevation myocardial infarction involving left anterior descending (LAD) coronary artery   Family history with mom with premature CAD at 48 yo, history of stents and CABG   Patient presented with anterior MI, s/p PCI to ostial LAD   Continue Aggrastat   Continue Tridil infusion, can wean  and chest pain improves  Echo with EF 40-45%,  akinetic apex    6/3/24  Asa, statin, ARB and plavix  Consider switching to prasugrel tomorrow if no issues with cost, pt currently pending medicaid coverage  Cont monitor in ICU another day      Recreational drug use   UDS positive for amphetamines, benzos, marijuana   Cessation strongly encouraged        VTE Risk Mitigation (From admission, onward)      None            Maricruz Posada NP  Cardiology  [facility] ([facility])

## 2024-06-03 NOTE — PLAN OF CARE
Pt is AAOx4, VSS on room air. Sinus tachycardia on the heart monitor with ST elevation, Cardiology is aware. Cardiac diet in place, pt tolerating well. POC reviewed with pt at bedside. Bed is locked in lowest position, side rails up x2, bed alarm set, call light/personal items within reach, pt instructed to call staff for assistance with mobility.  Pulse:  [101-120]   Resp:  [9-64]   BP: (115-163)/()   SpO2:  [96 %-99 %]      Problem: Adult Inpatient Plan of Care  Goal: Plan of Care Review  Outcome: Progressing  Goal: Patient-Specific Goal (Individualized)  Outcome: Progressing  Goal: Absence of Hospital-Acquired Illness or Injury  Outcome: Progressing  Goal: Optimal Comfort and Wellbeing  Outcome: Progressing  Goal: Readiness for Transition of Care  Outcome: Progressing     Problem: Wound  Goal: Optimal Coping  Outcome: Progressing  Goal: Optimal Functional Ability  Outcome: Progressing  Goal: Absence of Infection Signs and Symptoms  Outcome: Progressing  Goal: Improved Oral Intake  Outcome: Progressing  Goal: Optimal Pain Control and Function  Outcome: Progressing  Goal: Skin Health and Integrity  Outcome: Progressing  Goal: Optimal Wound Healing  Outcome: Progressing     Problem: Skin Injury Risk Increased  Goal: Skin Health and Integrity  Outcome: Progressing     Problem: Fall Injury Risk  Goal: Absence of Fall and Fall-Related Injury  Outcome: Progressing

## 2024-06-03 NOTE — ASSESSMENT & PLAN NOTE
Endorses occasional marijuana smoking  Positive drug screen with amphetamines which surprised him   The team discussed risks with tainted marijuana and more specifically recommendation to avoid amphetamine use now with cardiac issue

## 2024-06-03 NOTE — PLAN OF CARE
BARAK received secure chat from Maricruz Posada, NP, Cardiology regarding patient's medications. Per NP, Dr. Hooper wished to switch Patient to effient in attempts of a cheaper medication cost. BARAK advised NP to place medication order to pharmacy and she would call to get med cost.  Per Pharmacy, Patient does not have insurance and cost of medication would be $23.37.    BARAK relayed med cost to NP for Patient.    SW will continue to follow and assist as needed.

## 2024-06-03 NOTE — PLAN OF CARE
AAOx4. VSS. NSR on the monitor. On room air. Aggrastat stopped at 2100 per Dr. Mcnamara. Nitro titrated for chest pain; currently off. L groin puncture site WNL. Cardiac diet.Voiding per urinal; adequate UOP for shift. Bed alarm in use, call light within reach, POC reviewed with pt and mother at bedside.

## 2024-06-03 NOTE — PLAN OF CARE
[facility] - Intensive Care (Hospital)  Initial Discharge Assessment       Primary Care Provider: No, Primary Doctor, Wishes to establish with PCP upon DC    Admission Diagnosis: STEMI (ST elevation myocardial infarction) [I21.3]    Admission Date: 6/1/2024  Expected Discharge Date: Per Attending    Transition of Care Barriers: Unisured    Payor: /     Extended Emergency Contact Information  Primary Emergency Contact: Lizbet Beach  Mobile Phone: [phone]  Relation: Mother  Secondary Emergency Contact: Sharlene Choudhury  Mobile Phone: [phone]  Relation: Significant other  Preferred language: English   needed? No    Discharge Plan A: Home with family         [id number] - [place] - [address] AT [facility]  [address]  [place] [address]  Phone: [phone] Fax: [phone]      Initial Assessment (most recent)       Adult Discharge Assessment - 06/03/24 1138          Discharge Assessment    Assessment Type Discharge Planning Assessment     Confirmed/corrected address, phone number and insurance Yes     Confirmed Demographics Correct on Facesheet     Source of Information family;patient     Communicated JUANA with patient/caregiver Date not available/Unable to determine     Reason For Admission ST elevation myocardial infarction involving left anterior descending (LAD) coronary artery     People in Home parent(s)     Facility Arrived From: home     Do you expect to return to your current living situation? Yes     Do you have help at home or someone to help you manage your care at home? Yes     Who are your caregiver(s) and their phone number(s)? Lizbet Beach - mother     Prior to hospitilization cognitive status: Alert/Oriented     Current cognitive status: Alert/Oriented     Walking or Climbing Stairs Difficulty no     Dressing/Bathing Difficulty no     Home Accessibility wheelchair accessible     Equipment Currently Used at Home none      Readmission within 30 days? No     Patient currently being followed by outpatient case management? No     Do you currently have service(s) that help you manage your care at home? No     Do you take prescription medications? Yes     Do you have prescription coverage? No     Do you have any problems affording any of your prescribed medications? No     Is the patient taking medications as prescribed? yes     Who is going to help you get home at discharge? Lizbet Beach - mother     How do you get to doctors appointments? car, drives self     Are you on dialysis? No     Do you take coumadin? No     Discharge Plan A Home with family     DME Needed Upon Discharge  none     Discharge Plan discussed with: Patient;Parent(s)     Name(s) and Number(s) Lizbet Beach     Transition of Care Barriers Unisured        Transportation Needs    Has the lack of transportation kept you from medical appointments, meetings, work or from getting things needed for daily living? No                   Anticipated DC dispo: home with family  Prior Level of Function: independent with ADLs  People in home: Lizbet Beach - mother    Comments:  SW met with patient and mother at bedside to introduce role and discuss discharge planning. Patient's mother will be help at home and can provide transport at time of discharge. Confirmed demographics, insurance, and emergency contacts. Patient states he had Medicaid and has not yet reapplied for coverage. Patient stated he has recently gotten a new job, however it does not have benefits. Patient is being screened for Medicaid coverage now. CM discharge needs depends on hospital progress. SW will continue following to assist with other needs.

## 2024-06-03 NOTE — SUBJECTIVE & OBJECTIVE
Review of Systems   Constitutional: Negative.   HENT: Negative.     Eyes: Negative.    Cardiovascular: Negative.    Respiratory: Negative.     Skin: Negative.    Musculoskeletal: Negative.    Gastrointestinal: Negative.    Genitourinary: Negative.    Neurological: Negative.    Psychiatric/Behavioral: Negative.       Objective:     Vital Signs (Most Recent):  Temp: 97.7 °F (36.5 °C) (06/03/24 0305)  Pulse: 110 (06/03/24 1000)  Resp: (!) 35 (06/03/24 1000)  BP: (!) 156/93 (06/03/24 0945)  SpO2: 98 % (06/03/24 1000) Vital Signs (24h Range):  Temp:  [97.7 °F (36.5 °C)-99.4 °F (37.4 °C)] 97.7 °F (36.5 °C)  Pulse:  [] 110  Resp:  [0-64] 35  SpO2:  [94 %-99 %] 98 %  BP: (115-163)/() 156/93     Weight: 90.8 kg (200 lb 2.8 oz)  Body mass index is 32.31 kg/m².     SpO2: 98 %         Intake/Output Summary (Last 24 hours) at 6/3/2024 1318  Last data filed at 6/3/2024 0600  Gross per 24 hour   Intake 1098.73 ml   Output 2575 ml   Net -1476.27 ml       Lines/Drains/Airways       Peripheral Intravenous Line  Duration                  Peripheral IV - Single Lumen 06/01/24 18 G Left Antecubital 2 days         Peripheral IV - Single Lumen 06/01/24 1356 18 G Anterior;Distal;Right Upper Arm 1 day                       Physical Exam  Vitals and nursing note reviewed.   Constitutional:       Appearance: Normal appearance.   HENT:      Head: Normocephalic and atraumatic.   Eyes:      General:         Right eye: No discharge.         Left eye: No discharge.      Pupils: Pupils are equal, round, and reactive to light.   Cardiovascular:      Rate and Rhythm: Regular rhythm. Tachycardia present.      Heart sounds: S1 normal and S2 normal. No murmur heard.     No friction rub.   Pulmonary:      Effort: Pulmonary effort is normal. No respiratory distress.      Breath sounds: Normal breath sounds. No rales.   Abdominal:      Palpations: Abdomen is soft.      Tenderness: There is no abdominal tenderness.   Musculoskeletal:       "Cervical back: Neck supple.      Right lower leg: No edema.      Left lower leg: No edema.   Skin:     General: Skin is warm and dry.   Neurological:      General: No focal deficit present.      Mental Status: He is alert and oriented to person, place, and time.   Psychiatric:         Mood and Affect: Mood normal.         Behavior: Behavior normal.         Thought Content: Thought content normal.            Significant Labs: BMP:   Recent Labs   Lab 06/01/24  1417 06/02/24  0550 06/03/24  0444   * 100  100 106    139  139 135*   K 3.9 3.9  3.9 3.4*    106  106 102   CO2 18* 21*  21* 23   BUN 9 7  7 7   CREATININE 1.1 0.8  0.8 0.7   CALCIUM 9.4 9.1  9.1 9.3   MG  --   --  1.9   , CMP   Recent Labs   Lab 06/01/24  1417 06/02/24  0550 06/03/24  0444    139  139 135*   K 3.9 3.9  3.9 3.4*    106  106 102   CO2 18* 21*  21* 23   * 100  100 106   BUN 9 7  7 7   CREATININE 1.1 0.8  0.8 0.7   CALCIUM 9.4 9.1  9.1 9.3   PROT 8.3 7.0 7.4   ALBUMIN 4.1 3.6 3.6   BILITOT 0.7 0.7 1.5*   ALKPHOS 76 69 71   AST 36 224* 109*   ALT 39 58* 48*   ANIONGAP 18* 12  12 10   , CBC   Recent Labs   Lab 06/01/24  1417 06/03/24  0444   WBC 11.32 17.40*   HGB 12.4* 11.1*   HCT 39.5* 35.3*    321   , INR No results for input(s): "INR", "PROTIME" in the last 48 hours., Lipid Panel   Recent Labs   Lab 06/02/24  0550   CHOL 140   HDL 32*   LDLCALC 93.2   TRIG 74   CHOLHDL 22.9   , Troponin   Recent Labs   Lab 06/03/24  0444 06/03/24  0746 06/03/24  1225   TROPONINI 35.172* 35.480* 31.244*   , and All pertinent lab results from the last 24 hours have been reviewed.    Significant Imaging: Cardiac Cath: reviewed, Echocardiogram: Transthoracic echo (TTE) complete (Cupid Only):   Results for orders placed or performed during the hospital encounter of 06/01/24   Echo   Result Value Ref Range    BSA 2.04 m2    Left Atrium Major Axis 5.27 cm    Left Atrium Minor Axis 5.63 cm    RA Major Axis " 5.03 cm    LV Diastolic Volume 85.24 mL    LV Systolic Volume 58.35 mL    MV Peak A Daniel 0.53 m/s    MV stenosis pressure 1/2 time 60.13 ms    TR Max Daniel 1.84 m/s    MV Peak E Daniel 0.65 m/s    PV mean gradient 1 mmHg    RVOT peak VTI 11.2 cm    RVOT peak daniel 0.52 m/s    Ao VTI 24.60 cm    Ao peak daniel 1.42 m/s    LVOT peak VTI 15.90 cm    LVOT peak daniel 0.89 m/s    LVOT diameter 2.10 cm    IVRT 85.63 msec    E wave deceleration time 207.35 msec    PV peak gradient 4 mmHg    AV mean gradient 4 mmHg    RV S' 13.33 cm/s    TAPSE 2.00 cm    RVDD 3.55 cm    LA size 3.35 cm    Ascending aorta 2.69 cm    STJ 2.35 cm    LVIDs 3.71 2.1 - 4.0 cm    Posterior Wall 1.11 (A) 0.6 - 1.1 cm    IVS 0.87 0.6 - 1.1 cm    LVIDd 4.35 3.5 - 6.0 cm    PV PEAK VELOCITY 0.96 m/s    TDI LATERAL 0.12 m/s    Pulmonary Valve Mean Velocity 0.69 m/s    Left Ventricular Outflow Tract Mean Gradient 2.03 mmHg    Left Ventricular Outflow Tract Mean Velocity 0.68 cm/s    Pedro's Biplane MOD Ejection Fraction 43 %    IVC diameter 2.23 cm    TDI SEPTAL 0.11 m/s    LV LATERAL E/E' RATIO 5.42 m/s    LV SEPTAL E/E' RATIO 5.91 m/s    RV/LV Ratio 0.82 cm    FS 15 (A) 28 - 44 %    LV mass 143.14 g    ZLVIDD -2.73     ZLVIDS 0.43     Left Ventricle Relative Wall Thickness 0.51 cm    AV valve area 2.24 cm²    AV Velocity Ratio 0.63     AV index (prosthetic) 0.65     MV valve area p 1/2 method 3.66 cm2    E/A ratio 1.23     Mean e' 0.12 m/s    LVOT area 3.5 cm2    LVOT stroke volume 55.04 cm3    AV peak gradient 8 mmHg    E/E' ratio 5.65 m/s    LV Systolic Volume Index 29.5 mL/m2    LV Diastolic Volume Index 43.05 mL/m2    LV Mass Index 72 g/m2    Triscuspid Valve Regurgitation Peak Gradient 14 mmHg    SHIRAZ by Velocity Ratio 2.17 cm²    LA Volume Index 27.4 mL/m2    LA volume 54.26 cm3    LA WIDTH 3.5 cm    RA Width 3.8 cm    TV resting pulmonary artery pressure 29 mmHg    RV TB RVSP 17 mmHg    Est. RA pres 15 mmHg    Narrative      Left Ventricle: The left  ventricle is normal in size. Normal wall   thickness. There is concentric remodeling. Regional wall motion   abnormalities and Global hypokinesis present. There is mildly reduced   systolic function with a visually estimated ejection fraction of 40 - 45%.   Biplane (2D) method of discs ejection fraction is 43%. There is normal   diastolic function.    Right Ventricle: Normal right ventricular cavity size. Wall thickness   is normal. Systolic function is normal.    Pulmonary Artery: The estimated pulmonary artery systolic pressure is   29 mmHg.    IVC/SVC: Elevated venous pressure at 15 mmHg.     , EKG: reviewed, Stress Test: reviewed, and X-Ray: CXR: X-Ray Chest 1 View (CXR): No results found for this visit on 06/01/24.

## 2024-06-03 NOTE — ASSESSMENT & PLAN NOTE
Family history with mom with premature CAD at 48 yo, history of stents and CABG   Patient presented with anterior MI, s/p PCI to ostial LAD   Continue Aggrastat   Continue Tridil infusion, can wean  and chest pain improves  Echo with EF 40-45%,  akinetic apex    6/3/24  Asa, statin, ARB and plavix  Consider switching to prasugrel tomorrow if no issues with cost, pt currently pending medicaid coverage  Cont monitor in ICU another day

## 2024-06-03 NOTE — SUBJECTIVE & OBJECTIVE
Interval History: No acute events.  Complaining of pleuritic chest pain, central, sharp.  Non-reproducible with palpation.  Denies dyspnea, cough, fever/chills, sputum.        Objective:     Vital Signs (Most Recent):  Temp: 97.7 °F (36.5 °C) (06/03/24 0305)  Pulse: 110 (06/03/24 1000)  Resp: (!) 35 (06/03/24 1000)  BP: (!) 156/93 (06/03/24 0945)  SpO2: 98 % (06/03/24 1000) Vital Signs (24h Range):  Temp:  [97.7 °F (36.5 °C)-99.4 °F (37.4 °C)] 97.7 °F (36.5 °C)  Pulse:  [] 110  Resp:  [0-64] 35  SpO2:  [94 %-99 %] 98 %  BP: (115-163)/() 156/93     Weight: 90.8 kg (200 lb 2.8 oz)  Body mass index is 32.31 kg/m².      Intake/Output Summary (Last 24 hours) at 6/3/2024 1355  Last data filed at 6/3/2024 0600  Gross per 24 hour   Intake 1098.73 ml   Output 2575 ml   Net -1476.27 ml        Physical Exam  Vitals and nursing note reviewed.   Constitutional:       General: He is not in acute distress.  Cardiovascular:      Rate and Rhythm: Regular rhythm. Tachycardia present.      Pulses: Normal pulses.      Heart sounds: No murmur heard.  Pulmonary:      Effort: Pulmonary effort is normal. No respiratory distress.      Breath sounds: No wheezing, rhonchi or rales.   Abdominal:      General: Abdomen is flat. There is no distension.      Palpations: Abdomen is soft.      Tenderness: There is no abdominal tenderness.   Musculoskeletal:      Right lower leg: No edema.      Left lower leg: No edema.   Neurological:      General: No focal deficit present.      Mental Status: He is alert and oriented to person, place, and time. Mental status is at baseline.           Review of Systems    Vents:  Oxygen Concentration (%): 28 (06/03/24 0705)    Lines/Drains/Airways       Peripheral Intravenous Line  Duration                  Peripheral IV - Single Lumen 06/01/24 18 G Left Antecubital 2 days         Peripheral IV - Single Lumen 06/01/24 1356 18 G Anterior;Distal;Right Upper Arm 1 day                    Significant  Labs:    CBC/Anemia Profile:  Recent Labs   Lab 06/01/24  1417 06/03/24  0444   WBC 11.32 17.40*   HGB 12.4* 11.1*   HCT 39.5* 35.3*    321   MCV 82 82   RDW 13.0 12.9        Chemistries:  Recent Labs   Lab 06/01/24  1417 06/02/24  0550 06/03/24  0444    139  139 135*   K 3.9 3.9  3.9 3.4*    106  106 102   CO2 18* 21*  21* 23   BUN 9 7  7 7   CREATININE 1.1 0.8  0.8 0.7   CALCIUM 9.4 9.1  9.1 9.3   ALBUMIN 4.1 3.6 3.6   PROT 8.3 7.0 7.4   BILITOT 0.7 0.7 1.5*   ALKPHOS 76 69 71   ALT 39 58* 48*   AST 36 224* 109*   MG  --   --  1.9   PHOS  --   --  2.5*       All pertinent labs within the past 24 hours have been reviewed.    Significant Imaging:  I have reviewed all pertinent imaging results/findings within the past 24 hours.

## 2024-06-03 NOTE — ASSESSMENT & PLAN NOTE
Family history with mom with premature CAD at 48 yo, history of stents and CABG   Patient presented with anterior MI, s/p PCI to ostial LAD   Continue Aggrastat   Continue Tridil infusion, can wean  and chest pain improves  Echo with EF 40-45%,  akinetic apex

## 2024-06-03 NOTE — PROGRESS NOTES
[facility] - Intensive Care ([facility])  Critical Care Medicine  Progress Note    Patient Name: Wilfred Beach  MRN: [id number]  Admission Date: 6/1/2024  Hospital Length of Stay: 2 days  Code Status: Full Code  Attending Provider: Ena Daniels MD  Primary Care Provider: Johana, Primary Doctor   Principal Problem: ST elevation myocardial infarction involving left anterior descending (LAD) coronary artery    Subjective:     HPI:  28 year old male with elevated BMI but appears muscular/physically fit   Presented to ED on 6/1 for CP that started at work (reports avila, he was standing but not exerting)  EKG with acute anterolateral ST elevation  Taken for emergent LHC, per nurse report he had one stent placed  CC team now consulted to assist with management    Hospital/ICU Course:  Seen and examined after notification of consult by nursing staff  AAOx3, CP less (4/10), vomited earlier but denies current nausea.  Unsure but thinks he has uncles that had MIs at young age    Interval History: No acute events.  Complaining of pleuritic chest pain, central, sharp.  Non-reproducible with palpation.  Denies dyspnea, cough, fever/chills, sputum.        Objective:     Vital Signs (Most Recent):  Temp: 97.7 °F (36.5 °C) (06/03/24 0305)  Pulse: 110 (06/03/24 1000)  Resp: (!) 35 (06/03/24 1000)  BP: (!) 156/93 (06/03/24 0945)  SpO2: 98 % (06/03/24 1000) Vital Signs (24h Range):  Temp:  [97.7 °F (36.5 °C)-99.4 °F (37.4 °C)] 97.7 °F (36.5 °C)  Pulse:  [] 110  Resp:  [0-64] 35  SpO2:  [94 %-99 %] 98 %  BP: (115-163)/() 156/93     Weight: 90.8 kg (200 lb 2.8 oz)  Body mass index is 32.31 kg/m².      Intake/Output Summary (Last 24 hours) at 6/3/2024 1355  Last data filed at 6/3/2024 0600  Gross per 24 hour   Intake 1098.73 ml   Output 2575 ml   Net -1476.27 ml        Physical Exam  Vitals and nursing note reviewed.   Constitutional:       General: He is not in acute distress.  Cardiovascular:      Rate and Rhythm:  "Regular rhythm. Tachycardia present.      Pulses: Normal pulses.      Heart sounds: No murmur heard.  Pulmonary:      Effort: Pulmonary effort is normal. No respiratory distress.      Breath sounds: No wheezing, rhonchi or rales.   Abdominal:      General: Abdomen is flat. There is no distension.      Palpations: Abdomen is soft.      Tenderness: There is no abdominal tenderness.   Musculoskeletal:      Right lower leg: No edema.      Left lower leg: No edema.   Neurological:      General: No focal deficit present.      Mental Status: He is alert and oriented to person, place, and time. Mental status is at baseline.           Review of Systems    Vents:  Oxygen Concentration (%): 28 (06/03/24 0705)    Lines/Drains/Airways       Peripheral Intravenous Line  Duration                  Peripheral IV - Single Lumen 06/01/24 18 G Left Antecubital 2 days         Peripheral IV - Single Lumen 06/01/24 1356 18 G Anterior;Distal;Right Upper Arm 1 day                    Significant Labs:    CBC/Anemia Profile:  Recent Labs   Lab 06/01/24  1417 06/03/24  0444   WBC 11.32 17.40*   HGB 12.4* 11.1*   HCT 39.5* 35.3*    321   MCV 82 82   RDW 13.0 12.9        Chemistries:  Recent Labs   Lab 06/01/24  1417 06/02/24  0550 06/03/24  0444    139  139 135*   K 3.9 3.9  3.9 3.4*    106  106 102   CO2 18* 21*  21* 23   BUN 9 7  7 7   CREATININE 1.1 0.8  0.8 0.7   CALCIUM 9.4 9.1  9.1 9.3   ALBUMIN 4.1 3.6 3.6   PROT 8.3 7.0 7.4   BILITOT 0.7 0.7 1.5*   ALKPHOS 76 69 71   ALT 39 58* 48*   AST 36 224* 109*   MG  --   --  1.9   PHOS  --   --  2.5*       All pertinent labs within the past 24 hours have been reviewed.    Significant Imaging:  I have reviewed all pertinent imaging results/findings within the past 24 hours.    ABG  No results for input(s): "PH", "PO2", "PCO2", "HCO3", "BE" in the last 168 hours.  Assessment/Plan:     Psychiatric  Recreational drug use  Endorses occasional marijuana smoking  Positive drug " screen with amphetamines which surprised him   The team discussed risks with tainted marijuana and more specifically recommendation to avoid amphetamine use now with cardiac issue    Cardiac/Vascular  * ST elevation myocardial infarction involving left anterior descending (LAD) coronary artery  Per report, one stent placed to 100% occluded LAD  Educated on importance of treatment plan and follow up post acute MI  Cardiology managing  Plan for ASA, statin, RB, and BB therapy  - cardiology planning switch to prasugrel        Will monitor while in the ICU.     Travon Pruitt MD  Critical Care Medicine  [facility] - Intensive Care ([facility])

## 2024-06-03 NOTE — HPI
Patient is a 28 y.o. male presents with CP  after eating.  Reports not having an appetite for few days. Pt without any significant PMhx. 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 EKG with acute anterolateral MI.     CRF- mother with CAD/CABG and MI     Review of patient's allergies indicates:  No Known Allergies

## 2024-06-03 NOTE — SUBJECTIVE & OBJECTIVE
Review of Systems   Constitutional: Negative for diaphoresis, malaise/fatigue, weight gain and weight loss.   HENT:  Negative for congestion and nosebleeds.    Cardiovascular:  Positive for chest pain. Negative for claudication, cyanosis, dyspnea on exertion, irregular heartbeat, leg swelling, near-syncope, orthopnea, palpitations, paroxysmal nocturnal dyspnea and syncope.   Respiratory:  Positive for shortness of breath. Negative for cough, hemoptysis, sleep disturbances due to breathing, snoring, sputum production and wheezing.    Hematologic/Lymphatic: Negative for bleeding problem. Does not bruise/bleed easily.   Skin:  Negative for rash.   Musculoskeletal:  Negative for arthritis, back pain, falls, joint pain, muscle cramps and muscle weakness.   Gastrointestinal:  Negative for abdominal pain, constipation, diarrhea, heartburn, hematemesis, hematochezia, melena, nausea and vomiting.   Genitourinary:  Negative for dysuria, hematuria and nocturia.   Neurological:  Negative for excessive daytime sleepiness, dizziness, headaches, light-headedness, loss of balance, numbness, vertigo and weakness.     Objective:     Vital Signs (Most Recent):  Temp: 98.7 °F (37.1 °C) (06/02/24 1500)  Pulse: 110 (06/02/24 1800)  Resp: (!) 44 (06/02/24 1800)  BP: (!) 143/77 (06/02/24 1800)  SpO2: 97 % (06/02/24 1800) Vital Signs (24h Range):  Temp:  [98.1 °F (36.7 °C)-99 °F (37.2 °C)] 98.7 °F (37.1 °C)  Pulse:  [] 110  Resp:  [0-67] 44  SpO2:  [76 %-100 %] 97 %  BP: (109-174)/() 143/77     Weight: 89 kg (196 lb 3.4 oz)  Body mass index is 31.67 kg/m².     SpO2: 97 %         Intake/Output Summary (Last 24 hours) at 6/2/2024 1900  Last data filed at 6/2/2024 1800  Gross per 24 hour   Intake 1639.78 ml   Output 1800 ml   Net -160.22 ml       Lines/Drains/Airways       Peripheral Intravenous Line  Duration                  Peripheral IV - Single Lumen 06/01/24 1356 18 G Anterior;Distal;Right Upper Arm 1 day         Peripheral  IV - Single Lumen 06/01/24 18 G Left Antecubital 1 day                       Physical Exam  Vitals and nursing note reviewed.   Constitutional:       Appearance: Normal appearance. He is well-developed.   HENT:      Head: Normocephalic.      Mouth/Throat:      Mouth: Mucous membranes are moist.   Neck:      Vascular: No carotid bruit or JVD.   Cardiovascular:      Rate and Rhythm: Normal rate and regular rhythm.      Pulses: Normal pulses.      Heart sounds: Normal heart sounds. No murmur heard.     No friction rub.   Pulmonary:      Effort: Pulmonary effort is normal. No respiratory distress.      Breath sounds: Normal breath sounds. No wheezing or rales.   Abdominal:      General: Bowel sounds are normal. There is no distension.      Palpations: Abdomen is soft.      Tenderness: There is no abdominal tenderness. There is no guarding.   Musculoskeletal:         General: No swelling or tenderness.      Cervical back: Neck supple. No tenderness.      Right lower leg: No edema.      Left lower leg: No edema.   Skin:     General: Skin is warm and dry.      Capillary Refill: Capillary refill takes less than 2 seconds.      Findings: No rash.   Neurological:      General: No focal deficit present.      Mental Status: He is alert and oriented to person, place, and time.   Psychiatric:         Mood and Affect: Mood normal.         Behavior: Behavior normal.         Thought Content: Thought content normal.            Significant Labs: BMP:   Recent Labs   Lab 06/01/24  1417 06/02/24  0550   * 100  100    139  139   K 3.9 3.9  3.9    106  106   CO2 18* 21*  21*   BUN 9 7  7   CREATININE 1.1 0.8  0.8   CALCIUM 9.4 9.1  9.1   , CMP   Recent Labs   Lab 06/01/24  1417 06/02/24  0550    139  139   K 3.9 3.9  3.9    106  106   CO2 18* 21*  21*   * 100  100   BUN 9 7  7   CREATININE 1.1 0.8  0.8   CALCIUM 9.4 9.1  9.1   PROT 8.3 7.0   ALBUMIN 4.1 3.6   BILITOT 0.7 0.7   ALKPHOS  "76 69   AST 36 224*   ALT 39 58*   ANIONGAP 18* 12  12   , CBC   Recent Labs   Lab 06/01/24  1417   WBC 11.32   HGB 12.4*   HCT 39.5*      , INR No results for input(s): "INR", "PROTIME" in the last 48 hours., Lipid Panel   Recent Labs   Lab 06/02/24  0550   CHOL 140   HDL 32*   LDLCALC 93.2   TRIG 74   CHOLHDL 22.9   , and Troponin   Recent Labs   Lab 06/01/24  1417 06/02/24  0819 06/02/24  1433   TROPONINI <0.006 >50.000* >50.000*       Significant Imaging: Echocardiogram: 2D echo with color flow doppler: No results found for this or any previous visit. and Transthoracic echo (TTE) complete (Cupid Only):   Results for orders placed or performed during the hospital encounter of 06/01/24   Echo   Result Value Ref Range    BSA 2.04 m2    Left Atrium Major Axis 5.27 cm    Left Atrium Minor Axis 5.63 cm    RA Major Axis 5.03 cm    LV Diastolic Volume 85.24 mL    LV Systolic Volume 58.35 mL    MV Peak A Daniel 0.53 m/s    MV stenosis pressure 1/2 time 60.13 ms    TR Max Daniel 1.84 m/s    MV Peak E Daniel 0.65 m/s    PV mean gradient 1 mmHg    RVOT peak VTI 11.2 cm    RVOT peak daniel 0.52 m/s    Ao VTI 24.60 cm    Ao peak daniel 1.42 m/s    LVOT peak VTI 15.90 cm    LVOT peak daniel 0.89 m/s    LVOT diameter 2.10 cm    IVRT 85.63 msec    E wave deceleration time 207.35 msec    PV peak gradient 4 mmHg    AV mean gradient 4 mmHg    RV S' 13.33 cm/s    TAPSE 2.00 cm    RVDD 3.55 cm    LA size 3.35 cm    Ascending aorta 2.69 cm    STJ 2.35 cm    LVIDs 3.71 2.1 - 4.0 cm    Posterior Wall 1.11 (A) 0.6 - 1.1 cm    IVS 0.87 0.6 - 1.1 cm    LVIDd 4.35 3.5 - 6.0 cm    PV PEAK VELOCITY 0.96 m/s    TDI LATERAL 0.12 m/s    Pulmonary Valve Mean Velocity 0.69 m/s    Left Ventricular Outflow Tract Mean Gradient 2.03 mmHg    Left Ventricular Outflow Tract Mean Velocity 0.68 cm/s    Pedro's Biplane MOD Ejection Fraction 43 %    IVC diameter 2.23 cm    TDI SEPTAL 0.11 m/s    LV LATERAL E/E' RATIO 5.42 m/s    LV SEPTAL E/E' RATIO 5.91 m/s    " RV/LV Ratio 0.82 cm    FS 15 (A) 28 - 44 %    LV mass 143.14 g    ZLVIDD -2.73     ZLVIDS 0.43     Left Ventricle Relative Wall Thickness 0.51 cm    AV valve area 2.24 cm²    AV Velocity Ratio 0.63     AV index (prosthetic) 0.65     MV valve area p 1/2 method 3.66 cm2    E/A ratio 1.23     Mean e' 0.12 m/s    LVOT area 3.5 cm2    LVOT stroke volume 55.04 cm3    AV peak gradient 8 mmHg    E/E' ratio 5.65 m/s    LV Systolic Volume Index 29.5 mL/m2    LV Diastolic Volume Index 43.05 mL/m2    LV Mass Index 72 g/m2    Triscuspid Valve Regurgitation Peak Gradient 14 mmHg    SHIRAZ by Velocity Ratio 2.17 cm²    LA Volume Index 27.4 mL/m2    LA volume 54.26 cm3    LA WIDTH 3.5 cm    RA Width 3.8 cm    TV resting pulmonary artery pressure 29 mmHg    RV TB RVSP 17 mmHg    Est. RA pres 15 mmHg    Narrative      Left Ventricle: The left ventricle is normal in size. Normal wall   thickness. There is concentric remodeling. Regional wall motion   abnormalities and Global hypokinesis present. There is mildly reduced   systolic function with a visually estimated ejection fraction of 40 - 45%.   Biplane (2D) method of discs ejection fraction is 43%. There is normal   diastolic function.    Right Ventricle: Normal right ventricular cavity size. Wall thickness   is normal. Systolic function is normal.    Pulmonary Artery: The estimated pulmonary artery systolic pressure is   29 mmHg.    IVC/SVC: Elevated venous pressure at 15 mmHg.

## 2024-06-04 LAB
ALBUMIN SERPL BCP-MCNC: 3.4 G/DL (ref 3.5–5.2)
ALP SERPL-CCNC: 78 U/L (ref 55–135)
ALT SERPL W/O P-5'-P-CCNC: 41 U/L (ref 10–44)
ANION GAP SERPL CALC-SCNC: 14 MMOL/L (ref 8–16)
AST SERPL-CCNC: 57 U/L (ref 10–40)
BASOPHILS # BLD AUTO: 0.03 K/UL (ref 0–0.2)
BASOPHILS NFR BLD: 0.2 % (ref 0–1.9)
BILIRUB SERPL-MCNC: 1.6 MG/DL (ref 0.1–1)
BUN SERPL-MCNC: 7 MG/DL (ref 6–20)
CALCIUM SERPL-MCNC: 9.5 MG/DL (ref 8.7–10.5)
CHLORIDE SERPL-SCNC: 101 MMOL/L (ref 95–110)
CO2 SERPL-SCNC: 22 MMOL/L (ref 23–29)
CREAT SERPL-MCNC: 0.9 MG/DL (ref 0.5–1.4)
DIFFERENTIAL METHOD BLD: ABNORMAL
EOSINOPHIL # BLD AUTO: 0 K/UL (ref 0–0.5)
EOSINOPHIL NFR BLD: 0.1 % (ref 0–8)
ERYTHROCYTE [DISTWIDTH] IN BLOOD BY AUTOMATED COUNT: 12.9 % (ref 11.5–14.5)
EST. GFR  (NO RACE VARIABLE): >60 ML/MIN/1.73 M^2
GLUCOSE SERPL-MCNC: 131 MG/DL (ref 70–110)
HCT VFR BLD AUTO: 39.3 % (ref 40–54)
HGB BLD-MCNC: 12.4 G/DL (ref 14–18)
IMM GRANULOCYTES # BLD AUTO: 0.09 K/UL (ref 0–0.04)
IMM GRANULOCYTES NFR BLD AUTO: 0.6 % (ref 0–0.5)
LYMPHOCYTES # BLD AUTO: 1.7 K/UL (ref 1–4.8)
LYMPHOCYTES NFR BLD: 10.2 % (ref 18–48)
MAGNESIUM SERPL-MCNC: 1.8 MG/DL (ref 1.6–2.6)
MCH RBC QN AUTO: 25.7 PG (ref 27–31)
MCHC RBC AUTO-ENTMCNC: 31.6 G/DL (ref 32–36)
MCV RBC AUTO: 82 FL (ref 82–98)
MONOCYTES # BLD AUTO: 1.9 K/UL (ref 0.3–1)
MONOCYTES NFR BLD: 11.7 % (ref 4–15)
NEUTROPHILS # BLD AUTO: 12.6 K/UL (ref 1.8–7.7)
NEUTROPHILS NFR BLD: 77.2 % (ref 38–73)
NRBC BLD-RTO: 0 /100 WBC
PHOSPHATE SERPL-MCNC: 2.2 MG/DL (ref 2.7–4.5)
PLATELET # BLD AUTO: 330 K/UL (ref 150–450)
PMV BLD AUTO: 9.5 FL (ref 9.2–12.9)
POTASSIUM SERPL-SCNC: 3.4 MMOL/L (ref 3.5–5.1)
PROT SERPL-MCNC: 7.7 G/DL (ref 6–8.4)
RBC # BLD AUTO: 4.82 M/UL (ref 4.6–6.2)
SODIUM SERPL-SCNC: 137 MMOL/L (ref 136–145)
TROPONIN I SERPL DL<=0.01 NG/ML-MCNC: 16.45 NG/ML (ref 0–0.03)
TROPONIN I SERPL DL<=0.01 NG/ML-MCNC: 16.99 NG/ML (ref 0–0.03)
TROPONIN I SERPL DL<=0.01 NG/ML-MCNC: 22.45 NG/ML (ref 0–0.03)
WBC # BLD AUTO: 16.33 K/UL (ref 3.9–12.7)

## 2024-06-04 PROCEDURE — 99233 SBSQ HOSP IP/OBS HIGH 50: CPT | Mod: ,,, | Performed by: INTERNAL MEDICINE

## 2024-06-04 PROCEDURE — 63600175 PHARM REV CODE 636 W HCPCS: Performed by: INTERNAL MEDICINE

## 2024-06-04 PROCEDURE — 85025 COMPLETE CBC W/AUTO DIFF WBC: CPT | Performed by: NURSE PRACTITIONER

## 2024-06-04 PROCEDURE — 21400001 HC TELEMETRY ROOM

## 2024-06-04 PROCEDURE — 25000003 PHARM REV CODE 250: Performed by: INTERNAL MEDICINE

## 2024-06-04 PROCEDURE — 36415 COLL VENOUS BLD VENIPUNCTURE: CPT | Performed by: INTERNAL MEDICINE

## 2024-06-04 PROCEDURE — 83735 ASSAY OF MAGNESIUM: CPT | Performed by: NURSE PRACTITIONER

## 2024-06-04 PROCEDURE — 84484 ASSAY OF TROPONIN QUANT: CPT | Performed by: NURSE PRACTITIONER

## 2024-06-04 PROCEDURE — 25000003 PHARM REV CODE 250: Performed by: STUDENT IN AN ORGANIZED HEALTH CARE EDUCATION/TRAINING PROGRAM

## 2024-06-04 PROCEDURE — 36415 COLL VENOUS BLD VENIPUNCTURE: CPT | Performed by: NURSE PRACTITIONER

## 2024-06-04 PROCEDURE — 80053 COMPREHEN METABOLIC PANEL: CPT | Performed by: INTERNAL MEDICINE

## 2024-06-04 PROCEDURE — 84100 ASSAY OF PHOSPHORUS: CPT | Performed by: NURSE PRACTITIONER

## 2024-06-04 RX ORDER — ISOSORBIDE MONONITRATE 30 MG/1
30 TABLET, EXTENDED RELEASE ORAL DAILY
Status: DISCONTINUED | OUTPATIENT
Start: 2024-06-04 | End: 2024-06-06 | Stop reason: HOSPADM

## 2024-06-04 RX ORDER — PRASUGREL 10 MG/1
10 TABLET, FILM COATED ORAL DAILY
Status: DISCONTINUED | OUTPATIENT
Start: 2024-06-05 | End: 2024-06-04

## 2024-06-04 RX ORDER — AMOXICILLIN 250 MG
2 CAPSULE ORAL ONCE
Status: COMPLETED | OUTPATIENT
Start: 2024-06-04 | End: 2024-06-04

## 2024-06-04 RX ORDER — MAGNESIUM SULFATE HEPTAHYDRATE 40 MG/ML
2 INJECTION, SOLUTION INTRAVENOUS ONCE
Status: COMPLETED | OUTPATIENT
Start: 2024-06-04 | End: 2024-06-04

## 2024-06-04 RX ORDER — METOPROLOL TARTRATE 50 MG/1
50 TABLET ORAL 2 TIMES DAILY
Status: DISCONTINUED | OUTPATIENT
Start: 2024-06-04 | End: 2024-06-06 | Stop reason: HOSPADM

## 2024-06-04 RX ORDER — PRASUGREL 10 MG/1
10 TABLET, FILM COATED ORAL DAILY
Status: DISCONTINUED | OUTPATIENT
Start: 2024-06-04 | End: 2024-06-06 | Stop reason: HOSPADM

## 2024-06-04 RX ADMIN — ASPIRIN 81 MG: 81 TABLET, COATED ORAL at 08:06

## 2024-06-04 RX ADMIN — METOPROLOL TARTRATE 50 MG: 50 TABLET, FILM COATED ORAL at 08:06

## 2024-06-04 RX ADMIN — ACETAMINOPHEN 650 MG: 325 TABLET ORAL at 09:06

## 2024-06-04 RX ADMIN — CLOPIDOGREL BISULFATE 75 MG: 75 TABLET ORAL at 08:06

## 2024-06-04 RX ADMIN — MAGNESIUM SULFATE HEPTAHYDRATE 2 G: 40 INJECTION, SOLUTION INTRAVENOUS at 10:06

## 2024-06-04 RX ADMIN — POTASSIUM PHOSPHATE, MONOBASIC AND POTASSIUM PHOSPHATE, DIBASIC 30 MMOL: 224; 236 INJECTION, SOLUTION, CONCENTRATE INTRAVENOUS at 09:06

## 2024-06-04 RX ADMIN — SENNOSIDES AND DOCUSATE SODIUM 2 TABLET: 8.6; 5 TABLET ORAL at 01:06

## 2024-06-04 RX ADMIN — PRASUGREL 10 MG: 10 TABLET, FILM COATED ORAL at 10:06

## 2024-06-04 RX ADMIN — ATORVASTATIN CALCIUM 80 MG: 40 TABLET, FILM COATED ORAL at 09:06

## 2024-06-04 RX ADMIN — LOSARTAN POTASSIUM 25 MG: 25 TABLET, FILM COATED ORAL at 08:06

## 2024-06-04 RX ADMIN — METOPROLOL TARTRATE 50 MG: 50 TABLET, FILM COATED ORAL at 09:06

## 2024-06-04 RX ADMIN — ISOSORBIDE MONONITRATE 30 MG: 30 TABLET, EXTENDED RELEASE ORAL at 08:06

## 2024-06-04 NOTE — PROGRESS NOTES
[facility] (Hospital)  Cardiology  Progress Note    Patient Name: Wilfred Beach  MRN: [id number]  Admission Date: 6/1/2024  Hospital Length of Stay: 3 days  Code Status: Full Code   Attending Physician: Ena Daniels MD   Primary Care Physician: Johana, Primary Doctor  Expected Discharge Date:   Principal Problem:ST elevation myocardial infarction involving left anterior descending (LAD) coronary artery    Subjective:   Patient is a 28 y.o. male presents with CP while getting haircut. Pt without any significant PMhx. 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 EKG with acute anterolateral MI. To cath lab     CRF- mother with CAD/CABG and MI  Hospital Course:    6/2/24  underwent emergent LHC on yesterday for anterior STEMI, PCI to ostial LAD, had clot burden, on Aggrastat infusion.  Reports chest pain similar to what brought him in, but not as intense, 6/10 pain.  EKG post PCI with resolutions ST elevations.  EKG this morning with ST elevation in anterior leads, taking back to cath lab for evaluation,   Found to have distal clot burden.  Aggrastat continued, placed on tridil gtt.     6/3/24 pt seen and examined today sitting up in bed feels ok, denies any CP or SOB at this time. Labs reviewed, chart reviewed    6/4/24 pt seen and examined today, sitting up in bed. Feels ok today, reports improvement in chest pains denies any SOB at this time. Tele still with ST elevations. Labs reviewed, troponin trending down, Mg 1.8, Crt 0.09, K 3.4          Review of Systems   Constitutional: Negative.   HENT: Negative.     Eyes: Negative.    Cardiovascular:  Positive for chest pain.   Respiratory: Negative.     Skin: Negative.    Musculoskeletal: Negative.    Gastrointestinal: Negative.    Genitourinary: Negative.    Neurological: Negative.    Psychiatric/Behavioral: Negative.       Objective:     Vital Signs (Most Recent):  Temp: 98.1 °F (36.7 °C) (06/04/24 1315)  Pulse: 105 (06/04/24 1315)  Resp: 18 (06/04/24  1315)  BP: 113/75 (06/04/24 1315)  SpO2: 97 % (06/04/24 1315) Vital Signs (24h Range):  Temp:  [97.9 °F (36.6 °C)-98.8 °F (37.1 °C)] 98.1 °F (36.7 °C)  Pulse:  [] 105  Resp:  [0-56] 18  SpO2:  [93 %-98 %] 97 %  BP: (100-153)/(55-98) 113/75     Weight: 85.9 kg (189 lb 6 oz)  Body mass index is 30.57 kg/m².     SpO2: 97 %         Intake/Output Summary (Last 24 hours) at 6/4/2024 1320  Last data filed at 6/4/2024 1000  Gross per 24 hour   Intake 468.56 ml   Output 1800 ml   Net -1331.44 ml       Lines/Drains/Airways       Peripheral Intravenous Line  Duration                  Peripheral IV - Single Lumen 06/01/24 18 G Left Antecubital 3 days         Peripheral IV - Single Lumen 06/01/24 1356 18 G Anterior;Distal;Right Upper Arm 2 days                       Physical Exam  Vitals and nursing note reviewed.   Constitutional:       Appearance: Normal appearance.   HENT:      Head: Normocephalic and atraumatic.   Eyes:      General:         Right eye: No discharge.         Left eye: No discharge.      Pupils: Pupils are equal, round, and reactive to light.   Cardiovascular:      Rate and Rhythm: Normal rate and regular rhythm.      Heart sounds: S1 normal and S2 normal. No murmur heard.     No friction rub.   Pulmonary:      Effort: Pulmonary effort is normal. No respiratory distress.      Breath sounds: Normal breath sounds. No rales.   Abdominal:      Palpations: Abdomen is soft.      Tenderness: There is no abdominal tenderness.   Musculoskeletal:      Cervical back: Neck supple.      Right lower leg: No edema.      Left lower leg: No edema.   Skin:     General: Skin is warm and dry.   Neurological:      General: No focal deficit present.      Mental Status: He is alert and oriented to person, place, and time.   Psychiatric:         Mood and Affect: Mood normal.         Behavior: Behavior normal.         Thought Content: Thought content normal.            Significant Labs: BMP:   Recent Labs   Lab 06/03/24  9953  "06/04/24  0446    131*   * 137   K 3.4* 3.4*    101   CO2 23 22*   BUN 7 7   CREATININE 0.7 0.9   CALCIUM 9.3 9.5   MG 1.9 1.8   , CMP   Recent Labs   Lab 06/03/24  0444 06/04/24 0446   * 137   K 3.4* 3.4*    101   CO2 23 22*    131*   BUN 7 7   CREATININE 0.7 0.9   CALCIUM 9.3 9.5   PROT 7.4 7.7   ALBUMIN 3.6 3.4*   BILITOT 1.5* 1.6*   ALKPHOS 71 78   * 57*   ALT 48* 41   ANIONGAP 10 14   , CBC   Recent Labs   Lab 06/03/24 0444 06/04/24 0446   WBC 17.40* 16.33*   HGB 11.1* 12.4*   HCT 35.3* 39.3*    330   , INR No results for input(s): "INR", "PROTIME" in the last 48 hours., Lipid Panel No results for input(s): "CHOL", "HDL", "LDLCALC", "TRIG", "CHOLHDL" in the last 48 hours., Troponin   Recent Labs   Lab 06/03/24  2102 06/04/24  0446 06/04/24  1233   TROPONINI 25.950* 22.453* 16.449*   , and All pertinent lab results from the last 24 hours have been reviewed.    Significant Imaging: Cardiac Cath: reviewed, Echocardiogram: Transthoracic echo (TTE) complete (Cupid Only):   Results for orders placed or performed during the hospital encounter of 06/01/24   Echo   Result Value Ref Range    BSA 2.04 m2    Left Atrium Major Axis 5.27 cm    Left Atrium Minor Axis 5.63 cm    RA Major Axis 5.03 cm    LV Diastolic Volume 85.24 mL    LV Systolic Volume 58.35 mL    MV Peak A Daniel 0.53 m/s    MV stenosis pressure 1/2 time 60.13 ms    TR Max Daniel 1.84 m/s    MV Peak E Daniel 0.65 m/s    PV mean gradient 1 mmHg    RVOT peak VTI 11.2 cm    RVOT peak daniel 0.52 m/s    Ao VTI 24.60 cm    Ao peak daniel 1.42 m/s    LVOT peak VTI 15.90 cm    LVOT peak daniel 0.89 m/s    LVOT diameter 2.10 cm    IVRT 85.63 msec    E wave deceleration time 207.35 msec    PV peak gradient 4 mmHg    AV mean gradient 4 mmHg    RV S' 13.33 cm/s    TAPSE 2.00 cm    RVDD 3.55 cm    LA size 3.35 cm    Ascending aorta 2.69 cm    STJ 2.35 cm    LVIDs 3.71 2.1 - 4.0 cm    Posterior Wall 1.11 (A) 0.6 - 1.1 cm    IVS 0.87 " 0.6 - 1.1 cm    LVIDd 4.35 3.5 - 6.0 cm    PV PEAK VELOCITY 0.96 m/s    TDI LATERAL 0.12 m/s    Pulmonary Valve Mean Velocity 0.69 m/s    Left Ventricular Outflow Tract Mean Gradient 2.03 mmHg    Left Ventricular Outflow Tract Mean Velocity 0.68 cm/s    Pedro's Biplane MOD Ejection Fraction 43 %    IVC diameter 2.23 cm    TDI SEPTAL 0.11 m/s    LV LATERAL E/E' RATIO 5.42 m/s    LV SEPTAL E/E' RATIO 5.91 m/s    RV/LV Ratio 0.82 cm    FS 15 (A) 28 - 44 %    LV mass 143.14 g    ZLVIDD -2.73     ZLVIDS 0.43     Left Ventricle Relative Wall Thickness 0.51 cm    AV valve area 2.24 cm²    AV Velocity Ratio 0.63     AV index (prosthetic) 0.65     MV valve area p 1/2 method 3.66 cm2    E/A ratio 1.23     Mean e' 0.12 m/s    LVOT area 3.5 cm2    LVOT stroke volume 55.04 cm3    AV peak gradient 8 mmHg    E/E' ratio 5.65 m/s    LV Systolic Volume Index 29.5 mL/m2    LV Diastolic Volume Index 43.05 mL/m2    LV Mass Index 72 g/m2    Triscuspid Valve Regurgitation Peak Gradient 14 mmHg    SHIRAZ by Velocity Ratio 2.17 cm²    LA Volume Index 27.4 mL/m2    LA volume 54.26 cm3    LA WIDTH 3.5 cm    RA Width 3.8 cm    TV resting pulmonary artery pressure 29 mmHg    RV TB RVSP 17 mmHg    Est. RA pres 15 mmHg    Narrative      Left Ventricle: The left ventricle is normal in size. Normal wall   thickness. There is concentric remodeling. Regional wall motion   abnormalities and Global hypokinesis present. There is mildly reduced   systolic function with a visually estimated ejection fraction of 40 - 45%.   Biplane (2D) method of discs ejection fraction is 43%. There is normal   diastolic function.    Right Ventricle: Normal right ventricular cavity size. Wall thickness   is normal. Systolic function is normal.    Pulmonary Artery: The estimated pulmonary artery systolic pressure is   29 mmHg.    IVC/SVC: Elevated venous pressure at 15 mmHg.     , EKG: reviewed, Stress Test: reviewed, and X-Ray: CXR: X-Ray Chest 1 View (CXR): No results  found for this visit on 06/01/24.  Assessment and Plan:       * ST elevation myocardial infarction involving left anterior descending (LAD) coronary artery   Family history with mom with premature CAD at 48 yo, history of stents and CABG   Patient presented with anterior MI, s/p PCI to ostial LAD   Continue Aggrastat   Continue Tridil infusion, can wean  and chest pain improves  Echo with EF 40-45%,  akinetic apex    6/3/24  Asa, statin, ARB and plavix  Consider switching to prasugrel tomorrow if no issues with cost, pt currently pending medicaid coverage  Cont monitor in ICU another day    6/4/24  Switched plavix to prasugrel  Cont asa statin ARB, Imdur and BB  Ok to step down to telemetry       Recreational drug use   UDS positive for amphetamines, benzos, marijuana   Cessation strongly encouraged        VTE Risk Mitigation (From admission, onward)      None            Maricruz Posada, NP  Cardiology  [facility] ([facility])

## 2024-06-04 NOTE — PLAN OF CARE
Problem: Adult Inpatient Plan of Care  Goal: Plan of Care Review  Outcome: Progressing  Pt remains AAOx4. Complains of 2-3/10 chest pain, pt states this has been constant since Nitro gtt was turned off. SL Nitro given x1 with relief, NP aware. Afebrile. Sinus tach with ST elevation on monitor. BP stable. PIVs saline locked. Pt voids to urinal independently, adequate  urine output. Pt repositions independently in bed, ambulated in room with standby assist. POC reviewed with pt, verbalized understanding but needs reinforcement. Will continue with current POC and update as needed.

## 2024-06-04 NOTE — ASSESSMENT & PLAN NOTE
Family history with mom with premature CAD at 50 yo, history of stents and CABG   Patient presented with anterior MI, s/p PCI to ostial LAD   Continue Aggrastat   Continue Tridil infusion, can wean  and chest pain improves  Echo with EF 40-45%,  akinetic apex    6/3/24  Asa, statin, ARB and plavix  Consider switching to prasugrel tomorrow if no issues with cost, pt currently pending medicaid coverage  Cont monitor in ICU another day    6/4/24  Switched plavix to prasugrel  Cont asa statin ARB, Imdur and BB  Ok to step down to telemetry

## 2024-06-04 NOTE — PLAN OF CARE
Pt is AAOx4, VSS on room air. Sinus rhythm on heart monitor with ST elevation (Cardiology aware). Cardiac diet, pt tolerating well. Pt has procedure related chest pain, relieved by tylenol. Up at junior. POC reviewed with pt at bedside. Bed is locked in lowest position, side rails up x2, bed alarm set, call light/personal items within reach, pt instructed to call staff for assistance with mobility.   Temp:  [97.9 °F (36.6 °C)-98.1 °F (36.7 °C)]   Pulse:  []   Resp:  [0-64]   BP: (100-130)/(55-84)   SpO2:  [93 %-98 %]      Problem: Adult Inpatient Plan of Care  Goal: Plan of Care Review  Outcome: Progressing  Goal: Patient-Specific Goal (Individualized)  Outcome: Progressing  Goal: Absence of Hospital-Acquired Illness or Injury  Outcome: Progressing  Goal: Optimal Comfort and Wellbeing  Outcome: Progressing  Goal: Readiness for Transition of Care  Outcome: Progressing     Problem: Wound  Goal: Optimal Coping  Outcome: Progressing  Goal: Optimal Functional Ability  Outcome: Progressing  Goal: Absence of Infection Signs and Symptoms  Outcome: Progressing  Goal: Improved Oral Intake  Outcome: Progressing  Goal: Optimal Pain Control and Function  Outcome: Progressing  Goal: Skin Health and Integrity  Outcome: Progressing  Goal: Optimal Wound Healing  Outcome: Progressing     Problem: Skin Injury Risk Increased  Goal: Skin Health and Integrity  Outcome: Progressing     Problem: Fall Injury Risk  Goal: Absence of Fall and Fall-Related Injury  Outcome: Progressing

## 2024-06-04 NOTE — SUBJECTIVE & OBJECTIVE
Review of Systems   Constitutional: Negative.   HENT: Negative.     Eyes: Negative.    Cardiovascular:  Positive for chest pain.   Respiratory: Negative.     Skin: Negative.    Musculoskeletal: Negative.    Gastrointestinal: Negative.    Genitourinary: Negative.    Neurological: Negative.    Psychiatric/Behavioral: Negative.       Objective:     Vital Signs (Most Recent):  Temp: 98.1 °F (36.7 °C) (06/04/24 1315)  Pulse: 105 (06/04/24 1315)  Resp: 18 (06/04/24 1315)  BP: 113/75 (06/04/24 1315)  SpO2: 97 % (06/04/24 1315) Vital Signs (24h Range):  Temp:  [97.9 °F (36.6 °C)-98.8 °F (37.1 °C)] 98.1 °F (36.7 °C)  Pulse:  [] 105  Resp:  [0-56] 18  SpO2:  [93 %-98 %] 97 %  BP: (100-153)/(55-98) 113/75     Weight: 85.9 kg (189 lb 6 oz)  Body mass index is 30.57 kg/m².     SpO2: 97 %         Intake/Output Summary (Last 24 hours) at 6/4/2024 1320  Last data filed at 6/4/2024 1000  Gross per 24 hour   Intake 468.56 ml   Output 1800 ml   Net -1331.44 ml       Lines/Drains/Airways       Peripheral Intravenous Line  Duration                  Peripheral IV - Single Lumen 06/01/24 18 G Left Antecubital 3 days         Peripheral IV - Single Lumen 06/01/24 1356 18 G Anterior;Distal;Right Upper Arm 2 days                       Physical Exam  Vitals and nursing note reviewed.   Constitutional:       Appearance: Normal appearance.   HENT:      Head: Normocephalic and atraumatic.   Eyes:      General:         Right eye: No discharge.         Left eye: No discharge.      Pupils: Pupils are equal, round, and reactive to light.   Cardiovascular:      Rate and Rhythm: Normal rate and regular rhythm.      Heart sounds: S1 normal and S2 normal. No murmur heard.     No friction rub.   Pulmonary:      Effort: Pulmonary effort is normal. No respiratory distress.      Breath sounds: Normal breath sounds. No rales.   Abdominal:      Palpations: Abdomen is soft.      Tenderness: There is no abdominal tenderness.   Musculoskeletal:       "Cervical back: Neck supple.      Right lower leg: No edema.      Left lower leg: No edema.   Skin:     General: Skin is warm and dry.   Neurological:      General: No focal deficit present.      Mental Status: He is alert and oriented to person, place, and time.   Psychiatric:         Mood and Affect: Mood normal.         Behavior: Behavior normal.         Thought Content: Thought content normal.            Significant Labs: BMP:   Recent Labs   Lab 06/03/24 0444 06/04/24  0446    131*   * 137   K 3.4* 3.4*    101   CO2 23 22*   BUN 7 7   CREATININE 0.7 0.9   CALCIUM 9.3 9.5   MG 1.9 1.8   , CMP   Recent Labs   Lab 06/03/24 0444 06/04/24  0446   * 137   K 3.4* 3.4*    101   CO2 23 22*    131*   BUN 7 7   CREATININE 0.7 0.9   CALCIUM 9.3 9.5   PROT 7.4 7.7   ALBUMIN 3.6 3.4*   BILITOT 1.5* 1.6*   ALKPHOS 71 78   * 57*   ALT 48* 41   ANIONGAP 10 14   , CBC   Recent Labs   Lab 06/03/24 0444 06/04/24 0446   WBC 17.40* 16.33*   HGB 11.1* 12.4*   HCT 35.3* 39.3*    330   , INR No results for input(s): "INR", "PROTIME" in the last 48 hours., Lipid Panel No results for input(s): "CHOL", "HDL", "LDLCALC", "TRIG", "CHOLHDL" in the last 48 hours., Troponin   Recent Labs   Lab 06/03/24  2102 06/04/24  0446 06/04/24  1233   TROPONINI 25.950* 22.453* 16.449*   , and All pertinent lab results from the last 24 hours have been reviewed.    Significant Imaging: Cardiac Cath: reviewed, Echocardiogram: Transthoracic echo (TTE) complete (Cupid Only):   Results for orders placed or performed during the hospital encounter of 06/01/24   Echo   Result Value Ref Range    BSA 2.04 m2    Left Atrium Major Axis 5.27 cm    Left Atrium Minor Axis 5.63 cm    RA Major Axis 5.03 cm    LV Diastolic Volume 85.24 mL    LV Systolic Volume 58.35 mL    MV Peak A Daniel 0.53 m/s    MV stenosis pressure 1/2 time 60.13 ms    TR Max Daniel 1.84 m/s    MV Peak E Daniel 0.65 m/s    PV mean gradient 1 mmHg    RVOT " peak VTI 11.2 cm    RVOT peak li 0.52 m/s    Ao VTI 24.60 cm    Ao peak li 1.42 m/s    LVOT peak VTI 15.90 cm    LVOT peak li 0.89 m/s    LVOT diameter 2.10 cm    IVRT 85.63 msec    E wave deceleration time 207.35 msec    PV peak gradient 4 mmHg    AV mean gradient 4 mmHg    RV S' 13.33 cm/s    TAPSE 2.00 cm    RVDD 3.55 cm    LA size 3.35 cm    Ascending aorta 2.69 cm    STJ 2.35 cm    LVIDs 3.71 2.1 - 4.0 cm    Posterior Wall 1.11 (A) 0.6 - 1.1 cm    IVS 0.87 0.6 - 1.1 cm    LVIDd 4.35 3.5 - 6.0 cm    PV PEAK VELOCITY 0.96 m/s    TDI LATERAL 0.12 m/s    Pulmonary Valve Mean Velocity 0.69 m/s    Left Ventricular Outflow Tract Mean Gradient 2.03 mmHg    Left Ventricular Outflow Tract Mean Velocity 0.68 cm/s    Pedro's Biplane MOD Ejection Fraction 43 %    IVC diameter 2.23 cm    TDI SEPTAL 0.11 m/s    LV LATERAL E/E' RATIO 5.42 m/s    LV SEPTAL E/E' RATIO 5.91 m/s    RV/LV Ratio 0.82 cm    FS 15 (A) 28 - 44 %    LV mass 143.14 g    ZLVIDD -2.73     ZLVIDS 0.43     Left Ventricle Relative Wall Thickness 0.51 cm    AV valve area 2.24 cm²    AV Velocity Ratio 0.63     AV index (prosthetic) 0.65     MV valve area p 1/2 method 3.66 cm2    E/A ratio 1.23     Mean e' 0.12 m/s    LVOT area 3.5 cm2    LVOT stroke volume 55.04 cm3    AV peak gradient 8 mmHg    E/E' ratio 5.65 m/s    LV Systolic Volume Index 29.5 mL/m2    LV Diastolic Volume Index 43.05 mL/m2    LV Mass Index 72 g/m2    Triscuspid Valve Regurgitation Peak Gradient 14 mmHg    SHIRAZ by Velocity Ratio 2.17 cm²    LA Volume Index 27.4 mL/m2    LA volume 54.26 cm3    LA WIDTH 3.5 cm    RA Width 3.8 cm    TV resting pulmonary artery pressure 29 mmHg    RV TB RVSP 17 mmHg    Est. RA pres 15 mmHg    Narrative      Left Ventricle: The left ventricle is normal in size. Normal wall   thickness. There is concentric remodeling. Regional wall motion   abnormalities and Global hypokinesis present. There is mildly reduced   systolic function with a visually estimated  ejection fraction of 40 - 45%.   Biplane (2D) method of discs ejection fraction is 43%. There is normal   diastolic function.    Right Ventricle: Normal right ventricular cavity size. Wall thickness   is normal. Systolic function is normal.    Pulmonary Artery: The estimated pulmonary artery systolic pressure is   29 mmHg.    IVC/SVC: Elevated venous pressure at 15 mmHg.     , EKG: reviewed, Stress Test: reviewed, and X-Ray: CXR: X-Ray Chest 1 View (CXR): No results found for this visit on 06/01/24.

## 2024-06-05 LAB
BASOPHILS # BLD AUTO: 0.03 K/UL (ref 0–0.2)
BASOPHILS NFR BLD: 0.2 % (ref 0–1.9)
DIFFERENTIAL METHOD BLD: ABNORMAL
EOSINOPHIL # BLD AUTO: 0.1 K/UL (ref 0–0.5)
EOSINOPHIL NFR BLD: 0.4 % (ref 0–8)
ERYTHROCYTE [DISTWIDTH] IN BLOOD BY AUTOMATED COUNT: 12.5 % (ref 11.5–14.5)
HCT VFR BLD AUTO: 37.6 % (ref 40–54)
HGB BLD-MCNC: 12.1 G/DL (ref 14–18)
IMM GRANULOCYTES # BLD AUTO: 0.05 K/UL (ref 0–0.04)
IMM GRANULOCYTES NFR BLD AUTO: 0.4 % (ref 0–0.5)
LYMPHOCYTES # BLD AUTO: 2.4 K/UL (ref 1–4.8)
LYMPHOCYTES NFR BLD: 17.2 % (ref 18–48)
MAGNESIUM SERPL-MCNC: 2.1 MG/DL (ref 1.6–2.6)
MCH RBC QN AUTO: 26.1 PG (ref 27–31)
MCHC RBC AUTO-ENTMCNC: 32.2 G/DL (ref 32–36)
MCV RBC AUTO: 81 FL (ref 82–98)
MONOCYTES # BLD AUTO: 1.5 K/UL (ref 0.3–1)
MONOCYTES NFR BLD: 10.5 % (ref 4–15)
NEUTROPHILS # BLD AUTO: 10.1 K/UL (ref 1.8–7.7)
NEUTROPHILS NFR BLD: 71.3 % (ref 38–73)
NRBC BLD-RTO: 0 /100 WBC
PHOSPHATE SERPL-MCNC: 3.2 MG/DL (ref 2.7–4.5)
PLATELET # BLD AUTO: 399 K/UL (ref 150–450)
PMV BLD AUTO: 8.9 FL (ref 9.2–12.9)
RBC # BLD AUTO: 4.64 M/UL (ref 4.6–6.2)
TROPONIN I SERPL DL<=0.01 NG/ML-MCNC: 16.82 NG/ML (ref 0–0.03)
WBC # BLD AUTO: 14.13 K/UL (ref 3.9–12.7)

## 2024-06-05 PROCEDURE — 25000003 PHARM REV CODE 250: Performed by: INTERNAL MEDICINE

## 2024-06-05 PROCEDURE — 85025 COMPLETE CBC W/AUTO DIFF WBC: CPT | Performed by: NURSE PRACTITIONER

## 2024-06-05 PROCEDURE — 63600175 PHARM REV CODE 636 W HCPCS

## 2024-06-05 PROCEDURE — 83735 ASSAY OF MAGNESIUM: CPT | Performed by: NURSE PRACTITIONER

## 2024-06-05 PROCEDURE — 11000001 HC ACUTE MED/SURG PRIVATE ROOM

## 2024-06-05 PROCEDURE — 36415 COLL VENOUS BLD VENIPUNCTURE: CPT | Performed by: NURSE PRACTITIONER

## 2024-06-05 PROCEDURE — 25000003 PHARM REV CODE 250: Performed by: STUDENT IN AN ORGANIZED HEALTH CARE EDUCATION/TRAINING PROGRAM

## 2024-06-05 PROCEDURE — 84100 ASSAY OF PHOSPHORUS: CPT | Performed by: NURSE PRACTITIONER

## 2024-06-05 PROCEDURE — 99233 SBSQ HOSP IP/OBS HIGH 50: CPT | Mod: ,,, | Performed by: INTERNAL MEDICINE

## 2024-06-05 PROCEDURE — 84484 ASSAY OF TROPONIN QUANT: CPT | Performed by: NURSE PRACTITIONER

## 2024-06-05 RX ORDER — ENOXAPARIN SODIUM 100 MG/ML
40 INJECTION SUBCUTANEOUS EVERY 24 HOURS
Status: DISCONTINUED | OUTPATIENT
Start: 2024-06-05 | End: 2024-06-06 | Stop reason: HOSPADM

## 2024-06-05 RX ADMIN — METOPROLOL TARTRATE 50 MG: 50 TABLET, FILM COATED ORAL at 08:06

## 2024-06-05 RX ADMIN — PRASUGREL 10 MG: 10 TABLET, FILM COATED ORAL at 08:06

## 2024-06-05 RX ADMIN — ATORVASTATIN CALCIUM 80 MG: 40 TABLET, FILM COATED ORAL at 08:06

## 2024-06-05 RX ADMIN — ISOSORBIDE MONONITRATE 30 MG: 30 TABLET, EXTENDED RELEASE ORAL at 08:06

## 2024-06-05 RX ADMIN — MUPIROCIN: 20 OINTMENT TOPICAL at 08:06

## 2024-06-05 RX ADMIN — ACETAMINOPHEN 650 MG: 325 TABLET ORAL at 08:06

## 2024-06-05 RX ADMIN — LOSARTAN POTASSIUM 25 MG: 25 TABLET, FILM COATED ORAL at 08:06

## 2024-06-05 RX ADMIN — ENOXAPARIN SODIUM 40 MG: 40 INJECTION SUBCUTANEOUS at 05:06

## 2024-06-05 RX ADMIN — ASPIRIN 81 MG: 81 TABLET, COATED ORAL at 08:06

## 2024-06-05 NOTE — SUBJECTIVE & OBJECTIVE
Review of Systems   Constitutional: Positive for malaise/fatigue.   HENT: Negative.     Eyes: Negative.    Cardiovascular: Negative.    Respiratory: Negative.     Endocrine: Negative.    Hematologic/Lymphatic: Negative.    Skin: Negative.    Musculoskeletal: Negative.    Gastrointestinal: Negative.    Genitourinary: Negative.    Neurological: Negative.    Psychiatric/Behavioral: Negative.     Allergic/Immunologic: Negative.      Objective:     Vital Signs (Most Recent):  Temp: 98.6 °F (37 °C) (06/05/24 0800)  Pulse: 94 (06/05/24 1145)  Resp: (!) 27 (06/05/24 1145)  BP: 125/79 (06/05/24 0753)  SpO2: 96 % (06/05/24 0745) Vital Signs (24h Range):  Temp:  [98.1 °F (36.7 °C)-99 °F (37.2 °C)] 98.6 °F (37 °C)  Pulse:  [] 94  Resp:  [15-64] 27  SpO2:  [96 %-98 %] 96 %  BP: (113-132)/(60-79) 125/79     Weight: 85.9 kg (189 lb 6 oz)  Body mass index is 30.57 kg/m².     SpO2: 96 %       No intake or output data in the 24 hours ending 06/05/24 1219    Lines/Drains/Airways       Peripheral Intravenous Line  Duration                  Peripheral IV - Single Lumen 06/04/24 2200 22 G Left;Posterior Hand <1 day                       Physical Exam  Vitals and nursing note reviewed.   Constitutional:       Appearance: He is well-developed.   HENT:      Head: Normocephalic.   Neck:      Thyroid: No thyromegaly.      Vascular: No carotid bruit or JVD.   Cardiovascular:      Rate and Rhythm: Normal rate and regular rhythm.      Pulses:           Radial pulses are 2+ on the right side and 2+ on the left side.      Heart sounds: S1 normal and S2 normal. Heart sounds not distant. No midsystolic click and no opening snap. No murmur heard.     No friction rub. No S3 or S4 sounds.   Pulmonary:      Effort: Pulmonary effort is normal.      Breath sounds: Normal breath sounds. No wheezing or rales.   Abdominal:      General: Bowel sounds are normal. There is no distension or abdominal bruit.      Palpations: Abdomen is soft.       "Tenderness: There is no abdominal tenderness.   Musculoskeletal:      Cervical back: Neck supple.   Skin:     General: Skin is warm.   Neurological:      Mental Status: He is alert and oriented to person, place, and time.   Psychiatric:         Behavior: Behavior normal.            Significant Labs: ABG: No results for input(s): "PH", "PCO2", "HCO3", "POCSATURATED", "BE" in the last 48 hours., Blood Culture: No results for input(s): "LABBLOO" in the last 48 hours., BMP:   Recent Labs   Lab 06/04/24  0446 06/05/24  0428   *  --      --    K 3.4*  --      --    CO2 22*  --    BUN 7  --    CREATININE 0.9  --    CALCIUM 9.5  --    MG 1.8 2.1   , CMP   Recent Labs   Lab 06/04/24 0446      K 3.4*      CO2 22*   *   BUN 7   CREATININE 0.9   CALCIUM 9.5   PROT 7.7   ALBUMIN 3.4*   BILITOT 1.6*   ALKPHOS 78   AST 57*   ALT 41   ANIONGAP 14   , CBC   Recent Labs   Lab 06/04/24  0446 06/05/24  0428   WBC 16.33* 14.13*   HGB 12.4* 12.1*   HCT 39.3* 37.6*    399   , INR No results for input(s): "INR", "PROTIME" in the last 48 hours., Lipid Panel No results for input(s): "CHOL", "HDL", "LDLCALC", "TRIG", "CHOLHDL" in the last 48 hours., and Troponin   Recent Labs   Lab 06/04/24  1233 06/04/24  2030 06/05/24  0428   TROPONINI 16.449* 16.993* 16.818*       Significant Imaging: Echocardiogram: Transthoracic echo (TTE) complete (Cupid Only):   Results for orders placed or performed during the hospital encounter of 06/01/24   Echo   Result Value Ref Range    BSA 2.04 m2    Left Atrium Major Axis 5.27 cm    Left Atrium Minor Axis 5.63 cm    RA Major Axis 5.03 cm    LV Diastolic Volume 85.24 mL    LV Systolic Volume 58.35 mL    MV Peak A Daniel 0.53 m/s    MV stenosis pressure 1/2 time 60.13 ms    TR Max Daniel 1.84 m/s    MV Peak E Daniel 0.65 m/s    PV mean gradient 1 mmHg    RVOT peak VTI 11.2 cm    RVOT peak daniel 0.52 m/s    Ao VTI 24.60 cm    Ao peak daniel 1.42 m/s    LVOT peak VTI 15.90 cm    LVOT " peak li 0.89 m/s    LVOT diameter 2.10 cm    IVRT 85.63 msec    E wave deceleration time 207.35 msec    PV peak gradient 4 mmHg    AV mean gradient 4 mmHg    RV S' 13.33 cm/s    TAPSE 2.00 cm    RVDD 3.55 cm    LA size 3.35 cm    Ascending aorta 2.69 cm    STJ 2.35 cm    LVIDs 3.71 2.1 - 4.0 cm    Posterior Wall 1.11 (A) 0.6 - 1.1 cm    IVS 0.87 0.6 - 1.1 cm    LVIDd 4.35 3.5 - 6.0 cm    PV PEAK VELOCITY 0.96 m/s    TDI LATERAL 0.12 m/s    Pulmonary Valve Mean Velocity 0.69 m/s    Left Ventricular Outflow Tract Mean Gradient 2.03 mmHg    Left Ventricular Outflow Tract Mean Velocity 0.68 cm/s    Pedro's Biplane MOD Ejection Fraction 43 %    IVC diameter 2.23 cm    TDI SEPTAL 0.11 m/s    LV LATERAL E/E' RATIO 5.42 m/s    LV SEPTAL E/E' RATIO 5.91 m/s    RV/LV Ratio 0.82 cm    FS 15 (A) 28 - 44 %    LV mass 143.14 g    ZLVIDD -2.73     ZLVIDS 0.43     Left Ventricle Relative Wall Thickness 0.51 cm    AV valve area 2.24 cm²    AV Velocity Ratio 0.63     AV index (prosthetic) 0.65     MV valve area p 1/2 method 3.66 cm2    E/A ratio 1.23     Mean e' 0.12 m/s    LVOT area 3.5 cm2    LVOT stroke volume 55.04 cm3    AV peak gradient 8 mmHg    E/E' ratio 5.65 m/s    LV Systolic Volume Index 29.5 mL/m2    LV Diastolic Volume Index 43.05 mL/m2    LV Mass Index 72 g/m2    Triscuspid Valve Regurgitation Peak Gradient 14 mmHg    SHIRAZ by Velocity Ratio 2.17 cm²    LA Volume Index 27.4 mL/m2    LA volume 54.26 cm3    LA WIDTH 3.5 cm    RA Width 3.8 cm    TV resting pulmonary artery pressure 29 mmHg    RV TB RVSP 17 mmHg    Est. RA pres 15 mmHg    Narrative      Left Ventricle: The left ventricle is normal in size. Normal wall   thickness. There is concentric remodeling. Regional wall motion   abnormalities and Global hypokinesis present. There is mildly reduced   systolic function with a visually estimated ejection fraction of 40 - 45%.   Biplane (2D) method of discs ejection fraction is 43%. There is normal   diastolic  function.    Right Ventricle: Normal right ventricular cavity size. Wall thickness   is normal. Systolic function is normal.    Pulmonary Artery: The estimated pulmonary artery systolic pressure is   29 mmHg.    IVC/SVC: Elevated venous pressure at 15 mmHg.

## 2024-06-05 NOTE — PLAN OF CARE
Problem: Adult Inpatient Plan of Care  Goal: Plan of Care Review  Outcome: Progressing  Pt remains AAOx4. No complaints of pain or discomfort. Afebrile. Sinus tach with ST elevation on monitor. BP stable. PIV saline locked. Pt ambulates to toilet independently. Pt repositions independently in bed. POC reviewed with pt, verbalized understanding but needs reinforcement. Will continue with current POC and update as needed.

## 2024-06-05 NOTE — PLAN OF CARE
Patient is stable. No signs or symptoms of acute distress. Continuous cardiac monitoring in place. Bed in lowest position, call light in reach. Chart orders reviewed.

## 2024-06-05 NOTE — PROGRESS NOTES
[facility] ([facility])  Cardiology  Progress Note    Patient Name: Wilfred Beach  MRN: [id number]  Admission Date: 6/1/2024  Hospital Length of Stay: 4 days  Code Status: Full Code   Attending Physician: Ena Daniels MD   Primary Care Physician: Johana, Primary Doctor  Expected Discharge Date:   Principal Problem:ST elevation myocardial infarction involving left anterior descending (LAD) coronary artery    Subjective:     HPI:  Patient is a 28 y.o. male presents with CP  after eating.  Reports not having an appetite for few days. Pt without any significant PMhx. 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 EKG with acute anterolateral MI.     CRF- mother with CAD/CABG and MI     Review of patient's allergies indicates:  No Known Allergies     Hospital Course:    6/2/24  underwent emergent LHC on yesterday for anterior STEMI, PCI to ostial LAD, had clot burden, on Aggrastat infusion.  Reports chest pain similar to what brought him in, but not as intense, 6/10 pain.  EKG post PCI with resolutions ST elevations.  EKG this morning with ST elevation in anterior leads, taking back to cath lab for evaluation,   Found to have distal clot burden.  Aggrastat continued, placed on tridil gtt.     6/3/24 pt seen and examined today sitting up in bed feels ok, denies any CP or SOB at this time. Labs reviewed, chart reviewed    6/4/24 pt seen and examined today, sitting up in bed. Feels ok today, reports improvement in chest pains denies any SOB at this time. Tele still with ST elevations. Labs reviewed, troponin trending down, Mg 1.8, Crt 0.09, K 3.4    6/5/24: Pt seen and examined in ICU this am.  Felt better. No angina or CHF sxs. HR went up last night with ambulation in room.  Now on Effient.  Beta-blocker increased yesterday.  Chart/labs reviewed.  Still with persistent ST elevation on monitor due to his severe thrombotic MI.        Review of Systems   Constitutional: Positive for malaise/fatigue.   HENT:  Negative.     Eyes: Negative.    Cardiovascular: Negative.    Respiratory: Negative.     Endocrine: Negative.    Hematologic/Lymphatic: Negative.    Skin: Negative.    Musculoskeletal: Negative.    Gastrointestinal: Negative.    Genitourinary: Negative.    Neurological: Negative.    Psychiatric/Behavioral: Negative.     Allergic/Immunologic: Negative.      Objective:     Vital Signs (Most Recent):  Temp: 98.6 °F (37 °C) (06/05/24 0800)  Pulse: 94 (06/05/24 1145)  Resp: (!) 27 (06/05/24 1145)  BP: 125/79 (06/05/24 0753)  SpO2: 96 % (06/05/24 0745) Vital Signs (24h Range):  Temp:  [98.1 °F (36.7 °C)-99 °F (37.2 °C)] 98.6 °F (37 °C)  Pulse:  [] 94  Resp:  [15-64] 27  SpO2:  [96 %-98 %] 96 %  BP: (113-132)/(60-79) 125/79     Weight: 85.9 kg (189 lb 6 oz)  Body mass index is 30.57 kg/m².     SpO2: 96 %       No intake or output data in the 24 hours ending 06/05/24 1219    Lines/Drains/Airways       Peripheral Intravenous Line  Duration                  Peripheral IV - Single Lumen 06/04/24 2200 22 G Left;Posterior Hand <1 day                       Physical Exam  Vitals and nursing note reviewed.   Constitutional:       Appearance: He is well-developed.   HENT:      Head: Normocephalic.   Neck:      Thyroid: No thyromegaly.      Vascular: No carotid bruit or JVD.   Cardiovascular:      Rate and Rhythm: Normal rate and regular rhythm.      Pulses:           Radial pulses are 2+ on the right side and 2+ on the left side.      Heart sounds: S1 normal and S2 normal. Heart sounds not distant. No midsystolic click and no opening snap. No murmur heard.     No friction rub. No S3 or S4 sounds.   Pulmonary:      Effort: Pulmonary effort is normal.      Breath sounds: Normal breath sounds. No wheezing or rales.   Abdominal:      General: Bowel sounds are normal. There is no distension or abdominal bruit.      Palpations: Abdomen is soft.      Tenderness: There is no abdominal tenderness.   Musculoskeletal:      Cervical back:  "Neck supple.   Skin:     General: Skin is warm.   Neurological:      Mental Status: He is alert and oriented to person, place, and time.   Psychiatric:         Behavior: Behavior normal.            Significant Labs: ABG: No results for input(s): "PH", "PCO2", "HCO3", "POCSATURATED", "BE" in the last 48 hours., Blood Culture: No results for input(s): "LABBLOO" in the last 48 hours., BMP:   Recent Labs   Lab 06/04/24  0446 06/05/24  0428   *  --      --    K 3.4*  --      --    CO2 22*  --    BUN 7  --    CREATININE 0.9  --    CALCIUM 9.5  --    MG 1.8 2.1   , CMP   Recent Labs   Lab 06/04/24 0446      K 3.4*      CO2 22*   *   BUN 7   CREATININE 0.9   CALCIUM 9.5   PROT 7.7   ALBUMIN 3.4*   BILITOT 1.6*   ALKPHOS 78   AST 57*   ALT 41   ANIONGAP 14   , CBC   Recent Labs   Lab 06/04/24 0446 06/05/24 0428   WBC 16.33* 14.13*   HGB 12.4* 12.1*   HCT 39.3* 37.6*    399   , INR No results for input(s): "INR", "PROTIME" in the last 48 hours., Lipid Panel No results for input(s): "CHOL", "HDL", "LDLCALC", "TRIG", "CHOLHDL" in the last 48 hours., and Troponin   Recent Labs   Lab 06/04/24  1233 06/04/24  2030 06/05/24  0428   TROPONINI 16.449* 16.993* 16.818*       Significant Imaging: Echocardiogram: Transthoracic echo (TTE) complete (Cupid Only):   Results for orders placed or performed during the hospital encounter of 06/01/24   Echo   Result Value Ref Range    BSA 2.04 m2    Left Atrium Major Axis 5.27 cm    Left Atrium Minor Axis 5.63 cm    RA Major Axis 5.03 cm    LV Diastolic Volume 85.24 mL    LV Systolic Volume 58.35 mL    MV Peak A Daniel 0.53 m/s    MV stenosis pressure 1/2 time 60.13 ms    TR Max Daniel 1.84 m/s    MV Peak E Daniel 0.65 m/s    PV mean gradient 1 mmHg    RVOT peak VTI 11.2 cm    RVOT peak daniel 0.52 m/s    Ao VTI 24.60 cm    Ao peak daniel 1.42 m/s    LVOT peak VTI 15.90 cm    LVOT peak daniel 0.89 m/s    LVOT diameter 2.10 cm    IVRT 85.63 msec    E wave deceleration " time 207.35 msec    PV peak gradient 4 mmHg    AV mean gradient 4 mmHg    RV S' 13.33 cm/s    TAPSE 2.00 cm    RVDD 3.55 cm    LA size 3.35 cm    Ascending aorta 2.69 cm    STJ 2.35 cm    LVIDs 3.71 2.1 - 4.0 cm    Posterior Wall 1.11 (A) 0.6 - 1.1 cm    IVS 0.87 0.6 - 1.1 cm    LVIDd 4.35 3.5 - 6.0 cm    PV PEAK VELOCITY 0.96 m/s    TDI LATERAL 0.12 m/s    Pulmonary Valve Mean Velocity 0.69 m/s    Left Ventricular Outflow Tract Mean Gradient 2.03 mmHg    Left Ventricular Outflow Tract Mean Velocity 0.68 cm/s    Pedro's Biplane MOD Ejection Fraction 43 %    IVC diameter 2.23 cm    TDI SEPTAL 0.11 m/s    LV LATERAL E/E' RATIO 5.42 m/s    LV SEPTAL E/E' RATIO 5.91 m/s    RV/LV Ratio 0.82 cm    FS 15 (A) 28 - 44 %    LV mass 143.14 g    ZLVIDD -2.73     ZLVIDS 0.43     Left Ventricle Relative Wall Thickness 0.51 cm    AV valve area 2.24 cm²    AV Velocity Ratio 0.63     AV index (prosthetic) 0.65     MV valve area p 1/2 method 3.66 cm2    E/A ratio 1.23     Mean e' 0.12 m/s    LVOT area 3.5 cm2    LVOT stroke volume 55.04 cm3    AV peak gradient 8 mmHg    E/E' ratio 5.65 m/s    LV Systolic Volume Index 29.5 mL/m2    LV Diastolic Volume Index 43.05 mL/m2    LV Mass Index 72 g/m2    Triscuspid Valve Regurgitation Peak Gradient 14 mmHg    SHIRAZ by Velocity Ratio 2.17 cm²    LA Volume Index 27.4 mL/m2    LA volume 54.26 cm3    LA WIDTH 3.5 cm    RA Width 3.8 cm    TV resting pulmonary artery pressure 29 mmHg    RV TB RVSP 17 mmHg    Est. RA pres 15 mmHg    Narrative      Left Ventricle: The left ventricle is normal in size. Normal wall   thickness. There is concentric remodeling. Regional wall motion   abnormalities and Global hypokinesis present. There is mildly reduced   systolic function with a visually estimated ejection fraction of 40 - 45%.   Biplane (2D) method of discs ejection fraction is 43%. There is normal   diastolic function.    Right Ventricle: Normal right ventricular cavity size. Wall thickness   is  normal. Systolic function is normal.    Pulmonary Artery: The estimated pulmonary artery systolic pressure is   29 mmHg.    IVC/SVC: Elevated venous pressure at 15 mmHg.       Assessment and Plan:     S/P ACUTE STEMI WITH OCCLUDED LAD TX W IVANIA.  OMT ADVISED FOR CAD.  RISK FACTOR MODIFICATION.  DRUG ABUSE CESSATION.  DAPT X ONE YEAR.  PLAVIX SWITCHED TO EFFIENT.  BETA-BLOCKER, STATIN, NITRATES.  AMBULATE TODAY.  TX TO TELEMETERY.  HOME TOMORROW IF REMAINS STABLE.  F/U LABS IN AM.    * ST elevation myocardial infarction involving left anterior descending (LAD) coronary artery   Family history with mom with premature CAD at 48 yo, history of stents and CABG   Patient presented with anterior MI, s/p PCI to ostial LAD   Continue Aggrastat   Continue Tridil infusion, can wean  and chest pain improves  Echo with EF 40-45%,  akinetic apex    6/3/24  Asa, statin, ARB and plavix  Consider switching to prasugrel tomorrow if no issues with cost, pt currently pending medicaid coverage  Cont monitor in ICU another day    6/4/24  Switched plavix to prasugrel  Cont asa statin ARB, Imdur and BB  Ok to step down to telemetry       Recreational drug use   UDS positive for amphetamines, benzos, marijuana   Cessation strongly encouraged        VTE Risk Mitigation (From admission, onward)           Ordered     enoxaparin injection 40 mg  Every 24 hours         06/05/24 6049                    Horace Hooper MD  Cardiology  [facility] ([facility])

## 2024-06-05 NOTE — NURSING
Pt transferred via wheel chair to room 571. Heart monitor in place, all personal belongings sent with pt. Tolerated well.

## 2024-06-06 VITALS
BODY MASS INDEX: 30.44 KG/M2 | SYSTOLIC BLOOD PRESSURE: 114 MMHG | HEIGHT: 66 IN | TEMPERATURE: 98 F | HEART RATE: 87 BPM | RESPIRATION RATE: 18 BRPM | OXYGEN SATURATION: 98 % | WEIGHT: 189.38 LBS | DIASTOLIC BLOOD PRESSURE: 61 MMHG

## 2024-06-06 DIAGNOSIS — I21.02 ST ELEVATION MYOCARDIAL INFARCTION INVOLVING LEFT ANTERIOR DESCENDING (LAD) CORONARY ARTERY: Primary | ICD-10-CM

## 2024-06-06 LAB
ALBUMIN SERPL BCP-MCNC: 3.3 G/DL (ref 3.5–5.2)
ALP SERPL-CCNC: 91 U/L (ref 55–135)
ALT SERPL W/O P-5'-P-CCNC: 97 U/L (ref 10–44)
ANION GAP SERPL CALC-SCNC: 12 MMOL/L (ref 8–16)
AST SERPL-CCNC: 103 U/L (ref 10–40)
BASOPHILS # BLD AUTO: 0.02 K/UL (ref 0–0.2)
BASOPHILS # BLD AUTO: 0.02 K/UL (ref 0–0.2)
BASOPHILS NFR BLD: 0.2 % (ref 0–1.9)
BASOPHILS NFR BLD: 0.2 % (ref 0–1.9)
BILIRUB SERPL-MCNC: 1 MG/DL (ref 0.1–1)
BNP SERPL-MCNC: 89 PG/ML (ref 0–99)
BUN SERPL-MCNC: 9 MG/DL (ref 6–20)
CALCIUM SERPL-MCNC: 9.5 MG/DL (ref 8.7–10.5)
CHLORIDE SERPL-SCNC: 104 MMOL/L (ref 95–110)
CO2 SERPL-SCNC: 23 MMOL/L (ref 23–29)
CREAT SERPL-MCNC: 0.8 MG/DL (ref 0.5–1.4)
DIFFERENTIAL METHOD BLD: ABNORMAL
DIFFERENTIAL METHOD BLD: ABNORMAL
EOSINOPHIL # BLD AUTO: 0.1 K/UL (ref 0–0.5)
EOSINOPHIL # BLD AUTO: 0.1 K/UL (ref 0–0.5)
EOSINOPHIL NFR BLD: 1 % (ref 0–8)
EOSINOPHIL NFR BLD: 1 % (ref 0–8)
ERYTHROCYTE [DISTWIDTH] IN BLOOD BY AUTOMATED COUNT: 12.5 % (ref 11.5–14.5)
ERYTHROCYTE [DISTWIDTH] IN BLOOD BY AUTOMATED COUNT: 12.5 % (ref 11.5–14.5)
EST. GFR  (NO RACE VARIABLE): >60 ML/MIN/1.73 M^2
GLUCOSE SERPL-MCNC: 90 MG/DL (ref 70–110)
HCT VFR BLD AUTO: 36.5 % (ref 40–54)
HCT VFR BLD AUTO: 36.5 % (ref 40–54)
HGB BLD-MCNC: 11.5 G/DL (ref 14–18)
HGB BLD-MCNC: 11.5 G/DL (ref 14–18)
IMM GRANULOCYTES # BLD AUTO: 0.05 K/UL (ref 0–0.04)
IMM GRANULOCYTES # BLD AUTO: 0.05 K/UL (ref 0–0.04)
IMM GRANULOCYTES NFR BLD AUTO: 0.5 % (ref 0–0.5)
IMM GRANULOCYTES NFR BLD AUTO: 0.5 % (ref 0–0.5)
LYMPHOCYTES # BLD AUTO: 2 K/UL (ref 1–4.8)
LYMPHOCYTES # BLD AUTO: 2 K/UL (ref 1–4.8)
LYMPHOCYTES NFR BLD: 18.9 % (ref 18–48)
LYMPHOCYTES NFR BLD: 18.9 % (ref 18–48)
MAGNESIUM SERPL-MCNC: 2 MG/DL (ref 1.6–2.6)
MCH RBC QN AUTO: 25.7 PG (ref 27–31)
MCH RBC QN AUTO: 25.7 PG (ref 27–31)
MCHC RBC AUTO-ENTMCNC: 31.5 G/DL (ref 32–36)
MCHC RBC AUTO-ENTMCNC: 31.5 G/DL (ref 32–36)
MCV RBC AUTO: 82 FL (ref 82–98)
MCV RBC AUTO: 82 FL (ref 82–98)
MONOCYTES # BLD AUTO: 1.1 K/UL (ref 0.3–1)
MONOCYTES # BLD AUTO: 1.1 K/UL (ref 0.3–1)
MONOCYTES NFR BLD: 10.4 % (ref 4–15)
MONOCYTES NFR BLD: 10.4 % (ref 4–15)
NEUTROPHILS # BLD AUTO: 7.1 K/UL (ref 1.8–7.7)
NEUTROPHILS # BLD AUTO: 7.1 K/UL (ref 1.8–7.7)
NEUTROPHILS NFR BLD: 69 % (ref 38–73)
NEUTROPHILS NFR BLD: 69 % (ref 38–73)
NRBC BLD-RTO: 0 /100 WBC
NRBC BLD-RTO: 0 /100 WBC
PHOSPHATE SERPL-MCNC: 3.3 MG/DL (ref 2.7–4.5)
PLATELET # BLD AUTO: 422 K/UL (ref 150–450)
PLATELET # BLD AUTO: 422 K/UL (ref 150–450)
PMV BLD AUTO: 8.8 FL (ref 9.2–12.9)
PMV BLD AUTO: 8.8 FL (ref 9.2–12.9)
POTASSIUM SERPL-SCNC: 3.8 MMOL/L (ref 3.5–5.1)
PROT SERPL-MCNC: 7.5 G/DL (ref 6–8.4)
RBC # BLD AUTO: 4.47 M/UL (ref 4.6–6.2)
RBC # BLD AUTO: 4.47 M/UL (ref 4.6–6.2)
SODIUM SERPL-SCNC: 139 MMOL/L (ref 136–145)
WBC # BLD AUTO: 10.31 K/UL (ref 3.9–12.7)
WBC # BLD AUTO: 10.31 K/UL (ref 3.9–12.7)

## 2024-06-06 PROCEDURE — 25000003 PHARM REV CODE 250: Performed by: INTERNAL MEDICINE

## 2024-06-06 PROCEDURE — 83880 ASSAY OF NATRIURETIC PEPTIDE: CPT | Performed by: INTERNAL MEDICINE

## 2024-06-06 PROCEDURE — 85025 COMPLETE CBC W/AUTO DIFF WBC: CPT | Performed by: NURSE PRACTITIONER

## 2024-06-06 PROCEDURE — 36415 COLL VENOUS BLD VENIPUNCTURE: CPT | Performed by: NURSE PRACTITIONER

## 2024-06-06 PROCEDURE — 80053 COMPREHEN METABOLIC PANEL: CPT | Performed by: INTERNAL MEDICINE

## 2024-06-06 PROCEDURE — 99238 HOSP IP/OBS DSCHRG MGMT 30/<: CPT | Mod: ,,,

## 2024-06-06 PROCEDURE — 84100 ASSAY OF PHOSPHORUS: CPT | Performed by: NURSE PRACTITIONER

## 2024-06-06 PROCEDURE — 25000003 PHARM REV CODE 250: Performed by: STUDENT IN AN ORGANIZED HEALTH CARE EDUCATION/TRAINING PROGRAM

## 2024-06-06 PROCEDURE — 83735 ASSAY OF MAGNESIUM: CPT | Performed by: NURSE PRACTITIONER

## 2024-06-06 RX ORDER — ASPIRIN 81 MG/1
81 TABLET ORAL DAILY
Qty: 90 TABLET | Refills: 3 | Status: SHIPPED | OUTPATIENT
Start: 2024-06-07 | End: 2024-06-19 | Stop reason: SDUPTHER

## 2024-06-06 RX ORDER — PRASUGREL 10 MG/1
10 TABLET, FILM COATED ORAL DAILY
Qty: 30 TABLET | Refills: 11 | Status: SHIPPED | OUTPATIENT
Start: 2024-06-06 | End: 2024-06-19 | Stop reason: SDUPTHER

## 2024-06-06 RX ORDER — ATORVASTATIN CALCIUM 80 MG/1
80 TABLET, FILM COATED ORAL NIGHTLY
Qty: 90 TABLET | Refills: 3 | Status: SHIPPED | OUTPATIENT
Start: 2024-06-06 | End: 2024-06-19 | Stop reason: SDUPTHER

## 2024-06-06 RX ORDER — METOPROLOL TARTRATE 50 MG/1
50 TABLET ORAL 2 TIMES DAILY
Qty: 180 TABLET | Refills: 3 | Status: ON HOLD | OUTPATIENT
Start: 2024-06-06 | End: 2024-06-16 | Stop reason: HOSPADM

## 2024-06-06 RX ORDER — LOSARTAN POTASSIUM 25 MG/1
25 TABLET ORAL DAILY
Qty: 90 TABLET | Refills: 3 | Status: SHIPPED | OUTPATIENT
Start: 2024-06-07 | End: 2024-06-19

## 2024-06-06 RX ORDER — ISOSORBIDE MONONITRATE 30 MG/1
30 TABLET, EXTENDED RELEASE ORAL DAILY
Qty: 30 TABLET | Refills: 11 | Status: SHIPPED | OUTPATIENT
Start: 2024-06-07 | End: 2024-06-19 | Stop reason: SDUPTHER

## 2024-06-06 RX ORDER — NITROGLYCERIN 0.4 MG/1
0.4 TABLET SUBLINGUAL EVERY 5 MIN PRN
Qty: 100 TABLET | Refills: 3 | Status: SHIPPED | OUTPATIENT
Start: 2024-06-06 | End: 2025-06-06

## 2024-06-06 RX ADMIN — MUPIROCIN: 20 OINTMENT TOPICAL at 09:06

## 2024-06-06 RX ADMIN — METOPROLOL TARTRATE 50 MG: 50 TABLET, FILM COATED ORAL at 09:06

## 2024-06-06 RX ADMIN — LOSARTAN POTASSIUM 25 MG: 25 TABLET, FILM COATED ORAL at 09:06

## 2024-06-06 RX ADMIN — ISOSORBIDE MONONITRATE 30 MG: 30 TABLET, EXTENDED RELEASE ORAL at 09:06

## 2024-06-06 RX ADMIN — ASPIRIN 81 MG: 81 TABLET, COATED ORAL at 09:06

## 2024-06-06 RX ADMIN — PRASUGREL 10 MG: 10 TABLET, FILM COATED ORAL at 09:06

## 2024-06-06 NOTE — PLAN OF CARE
Purposeful rounding q2hr   Call light within reach   Bed in lowest position  Free from falls  Med given per doc order   Chart check complete

## 2024-06-06 NOTE — PLAN OF CARE
Discharge education given. Patient verbalized an understanding. IV removed, catheter intact. Patient to be discharged with Rx meds and personal belongings. Patient mom present to bring him home.

## 2024-06-06 NOTE — DISCHARGE SUMMARY
[facility]  Cardiology  Discharge Summary      Patient Name: Wilfred Beach  MRN: [id number]  Admission Date: 6/1/2024  Hospital Length of Stay: 5 days  Discharge Date and Time:  06/06/2024 10:13 AM  Attending Physician: Ena Daniels MD    Discharging Provider: Maricruz Posada NP  Primary Care Physician: Johana, Primary Doctor    HPI:   Patient is a 28 y.o. male presents with CP  after eating.  Reports not having an appetite for few days. Pt without any significant PMhx. 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 EKG with acute anterolateral MI.     CRF- mother with CAD/CABG and MI     Review of patient's allergies indicates:  No Known Allergies       Procedure(s) (LRB):  Left heart cath (Left)     Indwelling Lines/Drains at time of discharge:  Lines/Drains/Airways       None                   Hospital Course:   6/2/24  underwent emergent LHC on yesterday for anterior STEMI, PCI to ostial LAD, had clot burden, on Aggrastat infusion.  Reports chest pain similar to what brought him in, but not as intense, 6/10 pain.  EKG post PCI with resolutions ST elevations.  EKG this morning with ST elevation in anterior leads, taking back to cath lab for evaluation,   Found to have distal clot burden.  Aggrastat continued, placed on tridil gtt.     6/3/24 pt seen and examined today sitting up in bed feels ok, denies any CP or SOB at this time. Labs reviewed, chart reviewed    6/4/24 pt seen and examined today, sitting up in bed. Feels ok today, reports improvement in chest pains denies any SOB at this time. Tele still with ST elevations. Labs reviewed, troponin trending down, Mg 1.8, Crt 0.09, K 3.4    6/5/24: Pt seen and examined in ICU this am.  Felt better. No angina or CHF sxs. HR went up last night with ambulation in room.  Now on Effient.  Beta-blocker increased yesterday.  Chart/labs reviewed.  Still with persistent ST elevation on monitor due to his severe thrombotic MI.    6/6/24 Pt seen and examined  today sitting up in bed feels ok still with some chest discomfort on the left side. Denies any SOB or palpitations. Walked with RN yesterday and HR was stable. Labs reviewed, chart reviewed. Discussed importance of medications compliance and drug cessation. All questions answered. Pt deemed suitable for discharge.    Goals of Care Treatment Preferences:  Code Status: Full Code      Consults:   Consults (From admission, onward)          Status Ordering Provider     Inpatient consult to Critical Care Medicine  Once        Provider:  Nataliia Garg ACNP-BC    Completed CAROLYN CONN            Significant Diagnostic Studies: LHC and Labs    Pending Diagnostic Studies:       None            Final Active Diagnoses:    Diagnosis Date Noted POA    PRINCIPAL PROBLEM:  ST elevation myocardial infarction involving left anterior descending (LAD) coronary artery [I21.02] 06/01/2024 Yes    Recreational drug use [F19.90] 06/01/2024 Yes      Problems Resolved During this Admission:     No new Assessment & Plan notes have been filed under this hospital service since the last note was generated.  Service: Cardiology      Discharged Condition: good    Disposition: Home or Self Care    Follow Up:   Follow-up Information       Maricruz Posada NP Follow up in 1 week(s).    Specialty: Cardiology  Contact information:  [address]  [place] [address]  [phone]                           Patient Instructions:      Diet Cardiac     Other restrictions (specify):   Order Comments: No heavy bending or lifting no strenuous activity, no soaking in water ok to shower with back to beam for 1 week     Notify your health care provider if you experience any of the following:  temperature >100.4     Notify your health care provider if you experience any of the following:  persistent nausea and vomiting or diarrhea     Notify your health care provider if you experience any of the following:  severe uncontrolled pain     Notify  your health care provider if you experience any of the following:  redness, tenderness, or signs of infection (pain, swelling, redness, odor or green/yellow discharge around incision site)     Notify your health care provider if you experience any of the following:  difficulty breathing or increased cough     Notify your health care provider if you experience any of the following:  severe persistent headache     Notify your health care provider if you experience any of the following:  worsening rash     Notify your health care provider if you experience any of the following:  persistent dizziness, light-headedness, or visual disturbances     Notify your health care provider if you experience any of the following:  increased confusion or weakness     Notify your health care provider if you experience any of the following:     Remove dressing in 48 hours     Medications:  Reconciled Home Medications:      Medication List        START taking these medications      aspirin 81 MG EC tablet  Commonly known as: ECOTRIN  Take 1 tablet (81 mg total) by mouth once daily.  Start taking on: June 7, 2024     atorvastatin 80 MG tablet  Commonly known as: LIPITOR  Take 1 tablet (80 mg total) by mouth every evening.     isosorbide mononitrate 30 MG 24 hr tablet  Commonly known as: IMDUR  Take 1 tablet (30 mg total) by mouth once daily.  Start taking on: June 7, 2024     losartan 25 MG tablet  Commonly known as: COZAAR  Take 1 tablet (25 mg total) by mouth once daily.  Start taking on: June 7, 2024     metoprolol tartrate 50 MG tablet  Commonly known as: LOPRESSOR  Take 1 tablet (50 mg total) by mouth 2 (two) times daily.     nitroGLYCERIN 0.4 MG SL tablet  Commonly known as: NITROSTAT  Place 1 tablet (0.4 mg total) under the tongue every 5 (five) minutes as needed for Chest pain.     prasugreL 10 mg Tab  Commonly known as: EFFIENT  Take 1 tablet (10 mg total) by mouth once daily.              Time spent on the discharge of patient:  25 minutes    Maricruz Posada NP  Cardiology  [facility]

## 2024-06-06 NOTE — SUBJECTIVE & OBJECTIVE
Review of Systems   Constitutional: Positive for malaise/fatigue.   HENT: Negative.     Eyes: Negative.    Cardiovascular:  Positive for chest pain.   Respiratory: Negative.     Skin: Negative.    Musculoskeletal:  Positive for stiffness.   Gastrointestinal: Negative.    Genitourinary: Negative.    Neurological: Negative.    Psychiatric/Behavioral: Negative.       Objective:     Vital Signs (Most Recent):  Temp: 98.6 °F (37 °C) (06/06/24 0736)  Pulse: 87 (06/06/24 0830)  Resp: 18 (06/06/24 0736)  BP: 109/70 (06/06/24 0736)  SpO2: 95 % (06/06/24 0736) Vital Signs (24h Range):  Temp:  [97.7 °F (36.5 °C)-98.6 °F (37 °C)] 98.6 °F (37 °C)  Pulse:  [] 87  Resp:  [9-54] 18  SpO2:  [95 %-99 %] 95 %  BP: (103-121)/(61-72) 109/70     Weight: 85.9 kg (189 lb 6 oz)  Body mass index is 30.57 kg/m².     SpO2: 95 %       No intake or output data in the 24 hours ending 06/06/24 1005    Lines/Drains/Airways       Peripheral Intravenous Line  Duration                  Peripheral IV - Single Lumen 06/04/24 2200 22 G Left;Posterior Hand 1 day                       Physical Exam  Vitals and nursing note reviewed.   Constitutional:       Appearance: Normal appearance.   HENT:      Head: Normocephalic and atraumatic.   Eyes:      General:         Right eye: No discharge.         Left eye: No discharge.      Pupils: Pupils are equal, round, and reactive to light.   Cardiovascular:      Rate and Rhythm: Normal rate and regular rhythm.      Heart sounds: S1 normal and S2 normal. No murmur heard.     No friction rub.   Pulmonary:      Effort: Pulmonary effort is normal. No respiratory distress.      Breath sounds: Normal breath sounds. No rales.   Abdominal:      Palpations: Abdomen is soft.      Tenderness: There is no abdominal tenderness.   Musculoskeletal:      Cervical back: Neck supple.      Right lower leg: No edema.      Left lower leg: No edema.   Skin:     General: Skin is warm and dry.   Neurological:      General: No  "focal deficit present.      Mental Status: He is alert and oriented to person, place, and time.   Psychiatric:         Mood and Affect: Mood normal.         Behavior: Behavior normal.         Thought Content: Thought content normal.            Significant Labs: BMP:   Recent Labs   Lab 06/05/24 0428 06/06/24  0526   GLU  --  90   NA  --  139   K  --  3.8   CL  --  104   CO2  --  23   BUN  --  9   CREATININE  --  0.8   CALCIUM  --  9.5   MG 2.1 2.0   , CMP   Recent Labs   Lab 06/06/24  0526      K 3.8      CO2 23   GLU 90   BUN 9   CREATININE 0.8   CALCIUM 9.5   PROT 7.5   ALBUMIN 3.3*   BILITOT 1.0   ALKPHOS 91   *   ALT 97*   ANIONGAP 12   , CBC   Recent Labs   Lab 06/05/24 0428 06/06/24  0526   WBC 14.13* 10.31  10.31   HGB 12.1* 11.5*  11.5*   HCT 37.6* 36.5*  36.5*    422  422   , INR No results for input(s): "INR", "PROTIME" in the last 48 hours., Lipid Panel No results for input(s): "CHOL", "HDL", "LDLCALC", "TRIG", "CHOLHDL" in the last 48 hours., Troponin   Recent Labs   Lab 06/04/24  1233 06/04/24  2030 06/05/24  0428   TROPONINI 16.449* 16.993* 16.818*   , and All pertinent lab results from the last 24 hours have been reviewed.    Significant Imaging: Cardiac Cath: reviewed, Echocardiogram: Transthoracic echo (TTE) complete (Cupid Only):   Results for orders placed or performed during the hospital encounter of 06/01/24   Echo   Result Value Ref Range    BSA 2.04 m2    Left Atrium Major Axis 5.27 cm    Left Atrium Minor Axis 5.63 cm    RA Major Axis 5.03 cm    LV Diastolic Volume 85.24 mL    LV Systolic Volume 58.35 mL    MV Peak A Daniel 0.53 m/s    MV stenosis pressure 1/2 time 60.13 ms    TR Max Daniel 1.84 m/s    MV Peak E Daniel 0.65 m/s    PV mean gradient 1 mmHg    RVOT peak VTI 11.2 cm    RVOT peak daniel 0.52 m/s    Ao VTI 24.60 cm    Ao peak daniel 1.42 m/s    LVOT peak VTI 15.90 cm    LVOT peak daniel 0.89 m/s    LVOT diameter 2.10 cm    IVRT 85.63 msec    E wave deceleration time " 207.35 msec    PV peak gradient 4 mmHg    AV mean gradient 4 mmHg    RV S' 13.33 cm/s    TAPSE 2.00 cm    RVDD 3.55 cm    LA size 3.35 cm    Ascending aorta 2.69 cm    STJ 2.35 cm    LVIDs 3.71 2.1 - 4.0 cm    Posterior Wall 1.11 (A) 0.6 - 1.1 cm    IVS 0.87 0.6 - 1.1 cm    LVIDd 4.35 3.5 - 6.0 cm    PV PEAK VELOCITY 0.96 m/s    TDI LATERAL 0.12 m/s    Pulmonary Valve Mean Velocity 0.69 m/s    Left Ventricular Outflow Tract Mean Gradient 2.03 mmHg    Left Ventricular Outflow Tract Mean Velocity 0.68 cm/s    Pedro's Biplane MOD Ejection Fraction 43 %    IVC diameter 2.23 cm    TDI SEPTAL 0.11 m/s    LV LATERAL E/E' RATIO 5.42 m/s    LV SEPTAL E/E' RATIO 5.91 m/s    RV/LV Ratio 0.82 cm    FS 15 (A) 28 - 44 %    LV mass 143.14 g    ZLVIDD -2.73     ZLVIDS 0.43     Left Ventricle Relative Wall Thickness 0.51 cm    AV valve area 2.24 cm²    AV Velocity Ratio 0.63     AV index (prosthetic) 0.65     MV valve area p 1/2 method 3.66 cm2    E/A ratio 1.23     Mean e' 0.12 m/s    LVOT area 3.5 cm2    LVOT stroke volume 55.04 cm3    AV peak gradient 8 mmHg    E/E' ratio 5.65 m/s    LV Systolic Volume Index 29.5 mL/m2    LV Diastolic Volume Index 43.05 mL/m2    LV Mass Index 72 g/m2    Triscuspid Valve Regurgitation Peak Gradient 14 mmHg    SHIRAZ by Velocity Ratio 2.17 cm²    LA Volume Index 27.4 mL/m2    LA volume 54.26 cm3    LA WIDTH 3.5 cm    RA Width 3.8 cm    TV resting pulmonary artery pressure 29 mmHg    RV TB RVSP 17 mmHg    Est. RA pres 15 mmHg    Narrative      Left Ventricle: The left ventricle is normal in size. Normal wall   thickness. There is concentric remodeling. Regional wall motion   abnormalities and Global hypokinesis present. There is mildly reduced   systolic function with a visually estimated ejection fraction of 40 - 45%.   Biplane (2D) method of discs ejection fraction is 43%. There is normal   diastolic function.    Right Ventricle: Normal right ventricular cavity size. Wall thickness   is normal.  Systolic function is normal.    Pulmonary Artery: The estimated pulmonary artery systolic pressure is   29 mmHg.    IVC/SVC: Elevated venous pressure at 15 mmHg.     , EKG: reviewed, Stress Test: reviewed, and X-Ray: CXR: X-Ray Chest 1 View (CXR): No results found for this visit on 06/01/24.

## 2024-06-06 NOTE — ASSESSMENT & PLAN NOTE
Family history with mom with premature CAD at 48 yo, history of stents and CABG   Patient presented with anterior MI, s/p PCI to ostial LAD   Continue Aggrastat   Continue Tridil infusion, can wean  and chest pain improves  Echo with EF 40-45%,  akinetic apex    6/3/24  Asa, statin, ARB and plavix  Consider switching to prasugrel tomorrow if no issues with cost, pt currently pending medicaid coverage  Cont monitor in ICU another day    6/4/24  Switched plavix to prasugrel  Cont asa statin ARB, Imdur and BB  Ok to step down to telemetry     6/6/24  Home today  Close follow up in clinic  Cont asa, statin, ARB, and imdur, BB  Check LFT in clinic

## 2024-06-06 NOTE — PLAN OF CARE
06/06/24 0922   Rounds   Attendance Provider;;Charge nurse;Physical therapist   Discharge Plan A Home with family   Why the patient remains in the hospital Requires continued medical care   Transition of Care Barriers None

## 2024-06-06 NOTE — PROGRESS NOTES
[facility]  Cardiology  Progress Note    Patient Name: Wilfred Beach  MRN: [id number]  Admission Date: 6/1/2024  Hospital Length of Stay: 5 days  Code Status: Full Code   Attending Physician: Ena Daniels MD   Primary Care Physician: Johana, Primary Doctor  Expected Discharge Date:   Principal Problem:ST elevation myocardial infarction involving left anterior descending (LAD) coronary artery    Subjective:   HPI:  Patient is a 28 y.o. male presents with CP  after eating.  Reports not having an appetite for few days. Pt without any significant PMhx. 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 EKG with acute anterolateral MI.     CRF- mother with CAD/CABG and MI     Review of patient's allergies indicates:  No Known Allergies  Hospital Course:    6/2/24  underwent emergent LHC on yesterday for anterior STEMI, PCI to ostial LAD, had clot burden, on Aggrastat infusion.  Reports chest pain similar to what brought him in, but not as intense, 6/10 pain.  EKG post PCI with resolutions ST elevations.  EKG this morning with ST elevation in anterior leads, taking back to cath lab for evaluation,   Found to have distal clot burden.  Aggrastat continued, placed on tridil gtt.     6/3/24 pt seen and examined today sitting up in bed feels ok, denies any CP or SOB at this time. Labs reviewed, chart reviewed    6/4/24 pt seen and examined today, sitting up in bed. Feels ok today, reports improvement in chest pains denies any SOB at this time. Tele still with ST elevations. Labs reviewed, troponin trending down, Mg 1.8, Crt 0.09, K 3.4    6/5/24: Pt seen and examined in ICU this am.  Felt better. No angina or CHF sxs. HR went up last night with ambulation in room.  Now on Effient.  Beta-blocker increased yesterday.  Chart/labs reviewed.  Still with persistent ST elevation on monitor due to his severe thrombotic MI.    6/6/24 Pt seen and examined today sitting up in bed feels ok still with some chest discomfort on the left  side. Denies any SOB or palpitations. Walked with RN yesterday and HR was stable. Labs reviewed, chart reviewed        Review of Systems   Constitutional: Positive for malaise/fatigue.   HENT: Negative.     Eyes: Negative.    Cardiovascular:  Positive for chest pain.   Respiratory: Negative.     Skin: Negative.    Musculoskeletal:  Positive for stiffness.   Gastrointestinal: Negative.    Genitourinary: Negative.    Neurological: Negative.    Psychiatric/Behavioral: Negative.       Objective:     Vital Signs (Most Recent):  Temp: 98.6 °F (37 °C) (06/06/24 0736)  Pulse: 87 (06/06/24 0830)  Resp: 18 (06/06/24 0736)  BP: 109/70 (06/06/24 0736)  SpO2: 95 % (06/06/24 0736) Vital Signs (24h Range):  Temp:  [97.7 °F (36.5 °C)-98.6 °F (37 °C)] 98.6 °F (37 °C)  Pulse:  [] 87  Resp:  [9-54] 18  SpO2:  [95 %-99 %] 95 %  BP: (103-121)/(61-72) 109/70     Weight: 85.9 kg (189 lb 6 oz)  Body mass index is 30.57 kg/m².     SpO2: 95 %       No intake or output data in the 24 hours ending 06/06/24 1005    Lines/Drains/Airways       Peripheral Intravenous Line  Duration                  Peripheral IV - Single Lumen 06/04/24 2200 22 G Left;Posterior Hand 1 day                       Physical Exam  Vitals and nursing note reviewed.   Constitutional:       Appearance: Normal appearance.   HENT:      Head: Normocephalic and atraumatic.   Eyes:      General:         Right eye: No discharge.         Left eye: No discharge.      Pupils: Pupils are equal, round, and reactive to light.   Cardiovascular:      Rate and Rhythm: Normal rate and regular rhythm.      Heart sounds: S1 normal and S2 normal. No murmur heard.     No friction rub.   Pulmonary:      Effort: Pulmonary effort is normal. No respiratory distress.      Breath sounds: Normal breath sounds. No rales.   Abdominal:      Palpations: Abdomen is soft.      Tenderness: There is no abdominal tenderness.   Musculoskeletal:      Cervical back: Neck supple.      Right lower leg: No  "edema.      Left lower leg: No edema.   Skin:     General: Skin is warm and dry.   Neurological:      General: No focal deficit present.      Mental Status: He is alert and oriented to person, place, and time.   Psychiatric:         Mood and Affect: Mood normal.         Behavior: Behavior normal.         Thought Content: Thought content normal.            Significant Labs: BMP:   Recent Labs   Lab 06/05/24  0428 06/06/24  0526   GLU  --  90   NA  --  139   K  --  3.8   CL  --  104   CO2  --  23   BUN  --  9   CREATININE  --  0.8   CALCIUM  --  9.5   MG 2.1 2.0   , CMP   Recent Labs   Lab 06/06/24  0526      K 3.8      CO2 23   GLU 90   BUN 9   CREATININE 0.8   CALCIUM 9.5   PROT 7.5   ALBUMIN 3.3*   BILITOT 1.0   ALKPHOS 91   *   ALT 97*   ANIONGAP 12   , CBC   Recent Labs   Lab 06/05/24  0428 06/06/24  0526   WBC 14.13* 10.31  10.31   HGB 12.1* 11.5*  11.5*   HCT 37.6* 36.5*  36.5*    422  422   , INR No results for input(s): "INR", "PROTIME" in the last 48 hours., Lipid Panel No results for input(s): "CHOL", "HDL", "LDLCALC", "TRIG", "CHOLHDL" in the last 48 hours., Troponin   Recent Labs   Lab 06/04/24  1233 06/04/24  2030 06/05/24  0428   TROPONINI 16.449* 16.993* 16.818*   , and All pertinent lab results from the last 24 hours have been reviewed.    Significant Imaging: Cardiac Cath: reviewed, Echocardiogram: Transthoracic echo (TTE) complete (Cupid Only):   Results for orders placed or performed during the hospital encounter of 06/01/24   Echo   Result Value Ref Range    BSA 2.04 m2    Left Atrium Major Axis 5.27 cm    Left Atrium Minor Axis 5.63 cm    RA Major Axis 5.03 cm    LV Diastolic Volume 85.24 mL    LV Systolic Volume 58.35 mL    MV Peak A Daniel 0.53 m/s    MV stenosis pressure 1/2 time 60.13 ms    TR Max Daniel 1.84 m/s    MV Peak E Daniel 0.65 m/s    PV mean gradient 1 mmHg    RVOT peak VTI 11.2 cm    RVOT peak daniel 0.52 m/s    Ao VTI 24.60 cm    Ao peak daniel 1.42 m/s    LVOT " peak VTI 15.90 cm    LVOT peak li 0.89 m/s    LVOT diameter 2.10 cm    IVRT 85.63 msec    E wave deceleration time 207.35 msec    PV peak gradient 4 mmHg    AV mean gradient 4 mmHg    RV S' 13.33 cm/s    TAPSE 2.00 cm    RVDD 3.55 cm    LA size 3.35 cm    Ascending aorta 2.69 cm    STJ 2.35 cm    LVIDs 3.71 2.1 - 4.0 cm    Posterior Wall 1.11 (A) 0.6 - 1.1 cm    IVS 0.87 0.6 - 1.1 cm    LVIDd 4.35 3.5 - 6.0 cm    PV PEAK VELOCITY 0.96 m/s    TDI LATERAL 0.12 m/s    Pulmonary Valve Mean Velocity 0.69 m/s    Left Ventricular Outflow Tract Mean Gradient 2.03 mmHg    Left Ventricular Outflow Tract Mean Velocity 0.68 cm/s    Pedro's Biplane MOD Ejection Fraction 43 %    IVC diameter 2.23 cm    TDI SEPTAL 0.11 m/s    LV LATERAL E/E' RATIO 5.42 m/s    LV SEPTAL E/E' RATIO 5.91 m/s    RV/LV Ratio 0.82 cm    FS 15 (A) 28 - 44 %    LV mass 143.14 g    ZLVIDD -2.73     ZLVIDS 0.43     Left Ventricle Relative Wall Thickness 0.51 cm    AV valve area 2.24 cm²    AV Velocity Ratio 0.63     AV index (prosthetic) 0.65     MV valve area p 1/2 method 3.66 cm2    E/A ratio 1.23     Mean e' 0.12 m/s    LVOT area 3.5 cm2    LVOT stroke volume 55.04 cm3    AV peak gradient 8 mmHg    E/E' ratio 5.65 m/s    LV Systolic Volume Index 29.5 mL/m2    LV Diastolic Volume Index 43.05 mL/m2    LV Mass Index 72 g/m2    Triscuspid Valve Regurgitation Peak Gradient 14 mmHg    SHIRAZ by Velocity Ratio 2.17 cm²    LA Volume Index 27.4 mL/m2    LA volume 54.26 cm3    LA WIDTH 3.5 cm    RA Width 3.8 cm    TV resting pulmonary artery pressure 29 mmHg    RV TB RVSP 17 mmHg    Est. RA pres 15 mmHg    Narrative      Left Ventricle: The left ventricle is normal in size. Normal wall   thickness. There is concentric remodeling. Regional wall motion   abnormalities and Global hypokinesis present. There is mildly reduced   systolic function with a visually estimated ejection fraction of 40 - 45%.   Biplane (2D) method of discs ejection fraction is 43%. There is  normal   diastolic function.    Right Ventricle: Normal right ventricular cavity size. Wall thickness   is normal. Systolic function is normal.    Pulmonary Artery: The estimated pulmonary artery systolic pressure is   29 mmHg.    IVC/SVC: Elevated venous pressure at 15 mmHg.     , EKG: reviewed, Stress Test: reviewed, and X-Ray: CXR: X-Ray Chest 1 View (CXR): No results found for this visit on 06/01/24.  Assessment and Plan:       * ST elevation myocardial infarction involving left anterior descending (LAD) coronary artery   Family history with mom with premature CAD at 50 yo, history of stents and CABG   Patient presented with anterior MI, s/p PCI to ostial LAD   Continue Aggrastat   Continue Tridil infusion, can wean  and chest pain improves  Echo with EF 40-45%,  akinetic apex    6/3/24  Asa, statin, ARB and plavix  Consider switching to prasugrel tomorrow if no issues with cost, pt currently pending medicaid coverage  Cont monitor in ICU another day    6/4/24  Switched plavix to prasugrel  Cont asa statin ARB, Imdur and BB  Ok to step down to telemetry     6/6/24  Home today  Close follow up in clinic  Cont asa, statin, ARB, and imdur, BB  Check LFT in clinic      Recreational drug use   UDS positive for amphetamines, benzos, marijuana   Cessation strongly encouraged        VTE Risk Mitigation (From admission, onward)           Ordered     enoxaparin injection 40 mg  Every 24 hours         06/05/24 6684                    Maricruz Posada NP  Cardiology  [facility]

## 2024-06-06 NOTE — PLAN OF CARE
[facility]  Discharge Final Note    Primary Care Provider: No, Primary Doctor    Expected Discharge Date: 6/6/2024    Final Discharge Note (most recent)       Final Note - 06/06/24 1025          Final Note    Assessment Type Final Discharge Note     Anticipated Discharge Disposition Home or Self Care     Hospital Resources/Appts/Education Provided --   pt is pending medicaid                             Contact Info       Maricruz Posada NP   Specialty: Cardiology    [address]  [place] [address]   Phone: [phone]       Next Steps: Follow up in 1 week(s)

## 2024-06-13 ENCOUNTER — TELEPHONE (OUTPATIENT)
Dept: CARDIOLOGY | Facility: CLINIC | Age: 29
End: 2024-06-13
Payer: MEDICAID

## 2024-06-13 NOTE — TELEPHONE ENCOUNTER
Called pt back and got him rescheduled w/ A. XIANG Nelson on 06/19/24    ----- Message from Zach Meier sent at 6/13/2024  1:41 PM CDT -----  Type:  Sooner Apoointment Request    Caller is requesting a sooner appointment.  Caller declined first available appointment listed below.  Caller will not accept being placed on the waitlist and is requesting a message be sent to doctor.  Name of Caller: Wilfred  When is the first available appointment? No appt available between now & 12-  Symptoms: hospital follow-up  Would the patient rather a call back or a response via [facility]? Call back   Best Call Back Number:[phone]  Additional Information:

## 2024-06-14 ENCOUNTER — HOSPITAL ENCOUNTER (OUTPATIENT)
Facility: HOSPITAL | Age: 29
Discharge: HOME | End: 2024-06-16
Attending: EMERGENCY MEDICINE | Admitting: HOSPITALIST
Payer: MEDICAID

## 2024-06-14 DIAGNOSIS — I25.118 CORONARY ARTERY DISEASE OF NATIVE ARTERY OF NATIVE HEART WITH STABLE ANGINA PECTORIS: Primary | ICD-10-CM

## 2024-06-14 DIAGNOSIS — I21.4 NON-ST ELEVATION MYOCARDIAL INFARCTION (NSTEMI): ICD-10-CM

## 2024-06-14 DIAGNOSIS — I21.4 NSTEMI (NON-ST ELEVATED MYOCARDIAL INFARCTION): ICD-10-CM

## 2024-06-14 DIAGNOSIS — R07.9 CHEST PAIN: ICD-10-CM

## 2024-06-14 DIAGNOSIS — R53.1 WEAKNESS: ICD-10-CM

## 2024-06-14 LAB
ALBUMIN SERPL BCP-MCNC: 3.9 G/DL (ref 3.5–5.2)
ALP SERPL-CCNC: 85 U/L (ref 55–135)
ALT SERPL W/O P-5'-P-CCNC: 34 U/L (ref 10–44)
ANION GAP SERPL CALC-SCNC: 9 MMOL/L (ref 8–16)
APTT PPP: 26.8 SEC (ref 21–32)
AST SERPL-CCNC: 22 U/L (ref 10–40)
BASOPHILS # BLD AUTO: 0.04 K/UL (ref 0–0.2)
BASOPHILS NFR BLD: 0.5 % (ref 0–1.9)
BILIRUB SERPL-MCNC: 0.8 MG/DL (ref 0.1–1)
BNP SERPL-MCNC: 88 PG/ML (ref 0–99)
BUN SERPL-MCNC: 11 MG/DL (ref 6–20)
CALCIUM SERPL-MCNC: 9.4 MG/DL (ref 8.7–10.5)
CHLORIDE SERPL-SCNC: 107 MMOL/L (ref 95–110)
CO2 SERPL-SCNC: 23 MMOL/L (ref 23–29)
CREAT SERPL-MCNC: 0.9 MG/DL (ref 0.5–1.4)
D DIMER PPP IA.FEU-MCNC: 1.64 MG/L FEU
DIFFERENTIAL METHOD BLD: ABNORMAL
EOSINOPHIL # BLD AUTO: 0.2 K/UL (ref 0–0.5)
EOSINOPHIL NFR BLD: 1.8 % (ref 0–8)
ERYTHROCYTE [DISTWIDTH] IN BLOOD BY AUTOMATED COUNT: 13.4 % (ref 11.5–14.5)
EST. GFR  (NO RACE VARIABLE): >60 ML/MIN/1.73 M^2
GLUCOSE SERPL-MCNC: 107 MG/DL (ref 70–110)
HCT VFR BLD AUTO: 33 % (ref 40–54)
HGB BLD-MCNC: 10.4 G/DL (ref 14–18)
IMM GRANULOCYTES # BLD AUTO: 0.05 K/UL (ref 0–0.04)
IMM GRANULOCYTES NFR BLD AUTO: 0.6 % (ref 0–0.5)
INR PPP: 1 (ref 0.8–1.2)
LYMPHOCYTES # BLD AUTO: 2.1 K/UL (ref 1–4.8)
LYMPHOCYTES NFR BLD: 24.9 % (ref 18–48)
MCH RBC QN AUTO: 25.9 PG (ref 27–31)
MCHC RBC AUTO-ENTMCNC: 31.5 G/DL (ref 32–36)
MCV RBC AUTO: 82 FL (ref 82–98)
MONOCYTES # BLD AUTO: 0.5 K/UL (ref 0.3–1)
MONOCYTES NFR BLD: 5.9 % (ref 4–15)
NEUTROPHILS # BLD AUTO: 5.6 K/UL (ref 1.8–7.7)
NEUTROPHILS NFR BLD: 66.3 % (ref 38–73)
NRBC BLD-RTO: 0 /100 WBC
PLATELET # BLD AUTO: 526 K/UL (ref 150–450)
PMV BLD AUTO: 8.3 FL (ref 9.2–12.9)
POTASSIUM SERPL-SCNC: 3.8 MMOL/L (ref 3.5–5.1)
PROT SERPL-MCNC: 7.6 G/DL (ref 6–8.4)
PROTHROMBIN TIME: 11.4 SEC (ref 9–12.5)
RBC # BLD AUTO: 4.02 M/UL (ref 4.6–6.2)
SODIUM SERPL-SCNC: 139 MMOL/L (ref 136–145)
TROPONIN I SERPL DL<=0.01 NG/ML-MCNC: 0.82 NG/ML (ref 0–0.03)
TROPONIN I SERPL DL<=0.01 NG/ML-MCNC: 0.86 NG/ML (ref 0–0.03)
TROPONIN I SERPL DL<=0.01 NG/ML-MCNC: 0.94 NG/ML (ref 0–0.03)
WBC # BLD AUTO: 8.36 K/UL (ref 3.9–12.7)

## 2024-06-14 PROCEDURE — 93010 ELECTROCARDIOGRAM REPORT: CPT | Mod: 59,,, | Performed by: INTERNAL MEDICINE

## 2024-06-14 PROCEDURE — 80053 COMPREHEN METABOLIC PANEL: CPT | Performed by: EMERGENCY MEDICINE

## 2024-06-14 PROCEDURE — 85025 COMPLETE CBC W/AUTO DIFF WBC: CPT | Performed by: EMERGENCY MEDICINE

## 2024-06-14 PROCEDURE — G0378 HOSPITAL OBSERVATION PER HR: HCPCS

## 2024-06-14 PROCEDURE — 85730 THROMBOPLASTIN TIME PARTIAL: CPT | Performed by: EMERGENCY MEDICINE

## 2024-06-14 PROCEDURE — 83880 ASSAY OF NATRIURETIC PEPTIDE: CPT | Performed by: EMERGENCY MEDICINE

## 2024-06-14 PROCEDURE — 36415 COLL VENOUS BLD VENIPUNCTURE: CPT | Performed by: NURSE PRACTITIONER

## 2024-06-14 PROCEDURE — 25000003 PHARM REV CODE 250: Performed by: NURSE PRACTITIONER

## 2024-06-14 PROCEDURE — 85610 PROTHROMBIN TIME: CPT | Performed by: EMERGENCY MEDICINE

## 2024-06-14 PROCEDURE — 25500020 PHARM REV CODE 255: Performed by: EMERGENCY MEDICINE

## 2024-06-14 PROCEDURE — 85379 FIBRIN DEGRADATION QUANT: CPT | Performed by: EMERGENCY MEDICINE

## 2024-06-14 PROCEDURE — 84484 ASSAY OF TROPONIN QUANT: CPT | Mod: 91 | Performed by: EMERGENCY MEDICINE

## 2024-06-14 PROCEDURE — 99285 EMERGENCY DEPT VISIT HI MDM: CPT | Mod: 25

## 2024-06-14 PROCEDURE — 84484 ASSAY OF TROPONIN QUANT: CPT | Performed by: NURSE PRACTITIONER

## 2024-06-14 PROCEDURE — 93010 ELECTROCARDIOGRAM REPORT: CPT | Mod: ,,, | Performed by: INTERNAL MEDICINE

## 2024-06-14 PROCEDURE — 93005 ELECTROCARDIOGRAM TRACING: CPT

## 2024-06-14 RX ORDER — METOPROLOL TARTRATE 50 MG/1
50 TABLET ORAL 2 TIMES DAILY
Status: DISCONTINUED | OUTPATIENT
Start: 2024-06-14 | End: 2024-06-15

## 2024-06-14 RX ORDER — PRASUGREL 10 MG/1
10 TABLET, FILM COATED ORAL DAILY
Status: DISCONTINUED | OUTPATIENT
Start: 2024-06-15 | End: 2024-06-16 | Stop reason: HOSPADM

## 2024-06-14 RX ORDER — NITROGLYCERIN 0.4 MG/1
0.4 TABLET SUBLINGUAL EVERY 5 MIN PRN
Status: DISCONTINUED | OUTPATIENT
Start: 2024-06-14 | End: 2024-06-16 | Stop reason: HOSPADM

## 2024-06-14 RX ORDER — NITROGLYCERIN 0.4 MG/1
0.4 TABLET SUBLINGUAL EVERY 5 MIN PRN
Status: DISCONTINUED | OUTPATIENT
Start: 2024-06-14 | End: 2024-06-14

## 2024-06-14 RX ORDER — ATORVASTATIN CALCIUM 40 MG/1
80 TABLET, FILM COATED ORAL NIGHTLY
Status: DISCONTINUED | OUTPATIENT
Start: 2024-06-14 | End: 2024-06-16 | Stop reason: HOSPADM

## 2024-06-14 RX ORDER — NAPROXEN SODIUM 220 MG/1
324 TABLET, FILM COATED ORAL ONCE
Status: COMPLETED | OUTPATIENT
Start: 2024-06-14 | End: 2024-06-14

## 2024-06-14 RX ORDER — ISOSORBIDE MONONITRATE 30 MG/1
30 TABLET, EXTENDED RELEASE ORAL DAILY
Status: DISCONTINUED | OUTPATIENT
Start: 2024-06-15 | End: 2024-06-16 | Stop reason: HOSPADM

## 2024-06-14 RX ORDER — LOSARTAN POTASSIUM 25 MG/1
25 TABLET ORAL DAILY
Status: DISCONTINUED | OUTPATIENT
Start: 2024-06-15 | End: 2024-06-16 | Stop reason: HOSPADM

## 2024-06-14 RX ORDER — NAPROXEN SODIUM 220 MG/1
81 TABLET, FILM COATED ORAL DAILY
Status: DISCONTINUED | OUTPATIENT
Start: 2024-06-15 | End: 2024-06-16 | Stop reason: HOSPADM

## 2024-06-14 RX ADMIN — IOHEXOL 100 ML: 350 INJECTION, SOLUTION INTRAVENOUS at 06:06

## 2024-06-14 RX ADMIN — ASPIRIN 81 MG CHEWABLE TABLET 324 MG: 81 TABLET CHEWABLE at 10:06

## 2024-06-14 RX ADMIN — METOPROLOL TARTRATE 50 MG: 50 TABLET, FILM COATED ORAL at 10:06

## 2024-06-14 RX ADMIN — ATORVASTATIN CALCIUM 80 MG: 40 TABLET, FILM COATED ORAL at 10:06

## 2024-06-14 NOTE — ED PROVIDER NOTES
"Emergency Medicine Provider Note - 6/14/2024       SCRIBE NOTE: I, Emma Coronel, am scribing for, and in the presence of, Dr. Melody Mckay.     History     Chief Complaint   Patient presents with    Chest Pain     Brought in by [facility] for chest pain and weakness. Hx of stent placement 2 weeks ago.        Allergies:  Review of patient's allergies indicates:  No Known Allergies     History of Present Illness   HPI    6/14/2024, 5:07 PM  The history is provided by the patient and old chart.    Wilfred Beach is a 28 y.o. male presenting to the ED for evaluation of chest pain and fatigue.  Onset:  3:30pm today.  Significant recent past medical history:  Ventricular tachycardia (6/1), Cardiac Catheterization on 6/1/2024: Ost LAD lesion 99%, stented. Re-catheterization on 6/2, Ost LAD stent patent.  Patient described his pain as "worried feeling" and his heart was not pumping right. (+) fatigue.  It was not associated with any nausea, vomiting, diaphoresis, or shortness of breath.  He reports compliance with Patient takes Aspirin (81mg), Effient (10mg), Metoprolol (50mg), Atorvastatin (80mg), Losartan (25mg) and Imdur (30mg).  Patient denies any fever, cough, nausea, vomiting, diarrhea, dysuria, urgency, frequency, melena, hematochezia.    Reviewed EMS records:  KATHERINE: Patient found ambulatory on scene, walking up to ambulance as we arrived on scene, appearing A/Ox3 showing no obvious signs of distress. Patient being  attended by friend.  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 Upon EMS arrival patient found as above.  Patient described his feeling as his heart was dumping right. Patient advised that sensation had subsided prior to EMS arrival. Patient advised he had an MI x2 wks  ago and had a stent placed. Patient also advised he now takes a blood thinner and baby ASA daily and is compliant with meds.  T/D: Vitals, SpO2, " EKG-3-12. Patient transported to [facility]. Patient remained complaint free enroute to hospital. Radio and oral reports given. Patient turned  over to hospital staff.    Arrival mode: [facility]     PCP: Johana, Primary Doctor     Past Medical History:  STEMI 6/1/2024, stent Ostial LAD.    Past Surgical History:  Past Surgical History:   Procedure Laterality Date    LEFT HEART CATHETERIZATION Left 6/1/2024    Procedure: Left heart cath;  Surgeon: Óscar Mcnamara MD;  Location: [facility] CATH LAB;  Service: Cardiology;  Laterality: Left;    LEFT HEART CATHETERIZATION Left 6/2/2024    Procedure: Left heart cath;  Surgeon: Óscar Mcnamara MD;  Location: [facility] CATH LAB;  Service: Cardiology;  Laterality: Left;    ORIF MANDIBULAR FRACTURE Bilateral 5/31/2021    Procedure: ORIF, FRACTURE, MANDIBLE;  Surgeon: Michael Newberry MD;  Location: [facility] OR;  Service: Plastics;  Laterality: Bilateral;  Nasal intubation    PERCUTANEOUS TRANSLUMINAL BALLOON ANGIOPLASTY OF CORONARY ARTERY  6/1/2024    Procedure: Angioplasty-coronary;  Surgeon: Óscar Mcnamara MD;  Location: [facility] CATH LAB;  Service: Cardiology;;    STENT, DRUG ELUTING, SINGLE VESSEL, CORONARY  6/1/2024    Procedure: Stent, Drug Eluting, Single Vessel, Coronary;  Surgeon: Óscar Mcnamara MD;  Location: [facility] CATH LAB;  Service: Cardiology;;    THROMBECTOMY, CORONARY  6/1/2024    Procedure: Thrombectomy, Coronary;  Surgeon: Óscar Mcnamara MD;  Location: [facility] CATH LAB;  Service: Cardiology;;         Family History:  No family history on file.    Social History:  Social History     Tobacco Use    Smoking status: quit          Smokeless tobacco: Never   Substance and Sexual Activity    Alcohol use: Never    Drug use: THC, none since 6/1/2024    Sexual activity: Not on file          Review of Systems   Review of Systems   Constitutional:  Positive for fatigue. Negative for fever.   Respiratory:  Negative for shortness of breath.     Cardiovascular:  Positive for chest pain.   Gastrointestinal:  Negative for abdominal pain, blood in stool, diarrhea, nausea and vomiting.   Genitourinary:  Negative for dysuria.   Skin:  Negative for rash.   Neurological:  Positive for light-headedness. Negative for weakness.   Hematological:  Does not bruise/bleed easily.   Psychiatric/Behavioral:  The patient is nervous/anxious.           Physical Exam     Initial Vitals [06/14/24 1649]   BP Pulse Resp Temp SpO2   131/79 78 19 97.5 °F (36.4 °C) 98 %      MAP       --          Physical Exam    Nursing Notes and Vital Signs Reviewed.  Constitutional: Patient is in no acute distress. Well-developed and well-nourished.  Head: Atraumatic. Normocephalic.  Eyes: PERRL. EOM intact. Conjunctivae are not pale. No scleral icterus.  ENT: Mucous membranes are moist. Oropharynx is clear and symmetric.    Neck: Supple. Full ROM. No lymphadenopathy.  Cardiovascular: Regular rate. Regular rhythm. No murmurs, rubs, or gallops. Distal pulses are 2+ and symmetric.  Pulmonary/Chest: No respiratory distress. Clear to auscultation bilaterally. No wheezing or rales.  Abdominal: Soft and non-distended.  There is no tenderness.  No rebound, guarding, or rigidity. Good bowel sounds.  Musculoskeletal: Moves all extremities. No obvious deformities. No edema. No calf tenderness.  Skin: Warm and dry.  Neurological:  Alert, awake, and appropriate.  Normal speech.  No acute focal neurological deficits are appreciated.  Cranial nerves 2-12 intact.  No focal deficits.  Psychiatric: Normal affect. Good eye contact. Appropriate in content. Anxious.     ED Course   ED Procedures:  Procedures    ED Vital Signs:  Vitals:    06/14/24 1649 06/14/24 1720 06/14/24 1730 06/14/24 1750   BP: 131/79 113/65 (!) 110/59 (!) 112/57   Pulse: 78      Resp: 19      Temp: 97.5 °F (36.4 °C)      TempSrc: Oral      SpO2: 98%      Weight:       Height:        06/14/24 1800 06/14/24 1840 06/14/24 1900 06/14/24 1920  "  BP: 110/66 (!) 99/58 (!) 95/51 (!) 101/57   Pulse:  73 74 73   Resp:  20 20 19   Temp:  98.2 °F (36.8 °C)     TempSrc:  Oral     SpO2:  100% 100% 100%   Weight:       Height:        06/14/24 2100 06/14/24 2137 06/14/24 2142 06/14/24 2145   BP: 103/60 115/70     Pulse: 74 68 70    Resp: 18 16     Temp:  98.6 °F (37 °C)     TempSrc:       SpO2: 100% 99%     Weight:    81.7 kg (180 lb 1.9 oz)   Height:    5' 6" (1.676 m)       Abnormal Lab Results:  Labs Reviewed   CBC W/ AUTO DIFFERENTIAL - Abnormal; Notable for the following components:       Result Value    RBC 4.02 (*)     Hemoglobin 10.4 (*)     Hematocrit 33.0 (*)     MCH 25.9 (*)     MCHC 31.5 (*)     Platelets 526 (*)     MPV 8.3 (*)     Immature Granulocytes 0.6 (*)     Immature Grans (Abs) 0.05 (*)     All other components within normal limits   TROPONIN I - Abnormal; Notable for the following components:    Troponin I 0.861 (*)     All other components within normal limits   TROPONIN I - Abnormal; Notable for the following components:    Troponin I 0.944 (*)     All other components within normal limits   D DIMER, QUANTITATIVE - Abnormal; Notable for the following components:    D-Dimer 1.64 (*)     All other components within normal limits   COMPREHENSIVE METABOLIC PANEL   PROTIME-INR   APTT   B-TYPE NATRIURETIC PEPTIDE   B-TYPE NATRIURETIC PEPTIDE        All Lab Results:  Results for orders placed or performed during the hospital encounter of 06/14/24   CBC auto differential   Result Value Ref Range    WBC 8.36 3.90 - 12.70 K/uL    RBC 4.02 (L) 4.60 - 6.20 M/uL    Hemoglobin 10.4 (L) 14.0 - 18.0 g/dL    Hematocrit 33.0 (L) 40.0 - 54.0 %    MCV 82 82 - 98 fL    MCH 25.9 (L) 27.0 - 31.0 pg    MCHC 31.5 (L) 32.0 - 36.0 g/dL    RDW 13.4 11.5 - 14.5 %    Platelets 526 (H) 150 - 450 K/uL    MPV 8.3 (L) 9.2 - 12.9 fL    Immature Granulocytes 0.6 (H) 0.0 - 0.5 %    Gran # (ANC) 5.6 1.8 - 7.7 K/uL    Immature Grans (Abs) 0.05 (H) 0.00 - 0.04 K/uL    Lymph # 2.1 " 1.0 - 4.8 K/uL    Mono # 0.5 0.3 - 1.0 K/uL    Eos # 0.2 0.0 - 0.5 K/uL    Baso # 0.04 0.00 - 0.20 K/uL    nRBC 0 0 /100 WBC    Gran % 66.3 38.0 - 73.0 %    Lymph % 24.9 18.0 - 48.0 %    Mono % 5.9 4.0 - 15.0 %    Eosinophil % 1.8 0.0 - 8.0 %    Basophil % 0.5 0.0 - 1.9 %    Differential Method Automated    Comprehensive metabolic panel   Result Value Ref Range    Sodium 139 136 - 145 mmol/L    Potassium 3.8 3.5 - 5.1 mmol/L    Chloride 107 95 - 110 mmol/L    CO2 23 23 - 29 mmol/L    Glucose 107 70 - 110 mg/dL    BUN 11 6 - 20 mg/dL    Creatinine 0.9 0.5 - 1.4 mg/dL    Calcium 9.4 8.7 - 10.5 mg/dL    Total Protein 7.6 6.0 - 8.4 g/dL    Albumin 3.9 3.5 - 5.2 g/dL    Total Bilirubin 0.8 0.1 - 1.0 mg/dL    Alkaline Phosphatase 85 55 - 135 U/L    AST 22 10 - 40 U/L    ALT 34 10 - 44 U/L    eGFR >60 >60 mL/min/1.73 m^2    Anion Gap 9 8 - 16 mmol/L   Troponin I #1   Result Value Ref Range    Troponin I 0.861 (H) 0.000 - 0.026 ng/mL   Troponin I #2   Result Value Ref Range    Troponin I 0.944 (H) 0.000 - 0.026 ng/mL   D dimer, quantitative   Result Value Ref Range    D-Dimer 1.64 (H) <0.50 mg/L FEU   Protime-INR   Result Value Ref Range    Prothrombin Time 11.4 9.0 - 12.5 sec    INR 1.0 0.8 - 1.2   APTT   Result Value Ref Range    aPTT 26.8 21.0 - 32.0 sec   BNP   Result Value Ref Range    BNP 88 0 - 99 pg/mL   Troponin I   Result Value Ref Range    Troponin I 0.819 (H) 0.000 - 0.026 ng/mL             The EKG was ordered, reviewed, and independently interpreted by the ED provider:    ECG Results              Repeat EKG 12-lead (Preliminary result)  Result time 06/14/24 19:11:52      Wet Read by Melody Mckay DO (06/14/24 19:11:52, [facility] - Emergency Dept[facility] Emergency Medicine)    Repeat EKG: Rate 70 beats per minute.  Sinus rhythm.  Normal axis.  Q-wave noted in V1, V2, V3.  T-wave inversion noted in V2 through V6.  No dynamic changes.                                     EKG 12-lead (Preliminary result)  Result time  06/14/24 17:13:02      Wet Read by Melody Mckay DO (06/14/24 17:13:02, [facility] Emergency Dept[facility] Emergency Medicine)    Rate of 80 beats per minute.  Normal sinus rhythm.  Normal axis.  There is Q-wave in V1, V2, V3.  There is inverted T-waves in V2 through V6.                                    Imaging Results:  Imaging Results              CTA Chest Non-Coronary (PE Studies) (Final result)  Result time 06/14/24 18:31:53      Final result by Sandi Bustillos MD (06/14/24 18:31:53)                   Impression:      Negative for PE      Electronically signed by: Sandi Bustillos  Date:    06/14/2024  Time:    18:31               Narrative:    EXAMINATION:  CTA CHEST NON CORONARY (PE STUDIES)    CLINICAL HISTORY:  Pulmonary embolism (PE) suspected, positive D-dimer;    TECHNIQUE:  Angiographic technique PE protocol with MIPS and post processing volumetric 3D MIPS    Iterative technique with low-dose parameters for diminishing radiation dose as reasonably achievable    COMPARISON:  Radiographic correlation    FINDINGS:  No pulmonary embolus identified.    No pleural effusion    No sizable pulmonary consolidation large central airways patent.  Mild atelectasis lateral right lower lobe                                       X-Ray Chest AP Portable (Final result)  Result time 06/14/24 17:56:02      Final result by Sandi Bustillos MD (06/14/24 17:56:02)                   Impression:      Lungs underinflated otherwise well aerated      Electronically signed by: Sandi Bustillos  Date:    06/14/2024  Time:    17:56               Narrative:    EXAMINATION:  XR CHEST AP PORTABLE    CLINICAL HISTORY:  Chest Pain;.    TECHNIQUE:  Single frontal portable view of the chest was performed.    COMPARISON:  None    FINDINGS:  Support devices: None    The lungs are clear, with normal appearance of pulmonary vasculature and no pleural effusion or pneumothorax.                        Wet Read by Melody Mckay DO  (06/14/24 17:47:46, [facility] Emergency Dept[facility] Emergency Medicine)    No acute abnormality                                     Type of Interpretation: ED Physician (Independently Interpreted).  Radiology Procedure Done: Portable CXR.  Interpretation: No acute.  No infiltrate.          The Emergency Provider reviewed the vital signs and test results, which are outlined above.     ED Discussion   ED Medication(s):  Medications   aspirin chewable tablet 81 mg (has no administration in time range)   atorvastatin tablet 80 mg (80 mg Oral Given 6/14/24 2202)   isosorbide mononitrate 24 hr tablet 30 mg (has no administration in time range)   losartan tablet 25 mg (has no administration in time range)   metoprolol tartrate (LOPRESSOR) tablet 50 mg (50 mg Oral Given 6/14/24 2203)   nitroGLYCERIN SL tablet 0.4 mg (has no administration in time range)   prasugreL tablet 10 mg (has no administration in time range)   iohexoL (OMNIPAQUE 350) injection 100 mL (100 mLs Intravenous Given 6/14/24 1821)   aspirin chewable tablet 324 mg (324 mg Oral Given 6/14/24 2206)       ED Course as of 06/14/24 2211 Fri Jun 14, 2024 1659   The Ost LAD lesion was 99% stenosed with 0% stenosis post-intervention.  ·  The ejection fraction was calculated to be 50%.  ·  The pre-procedure left ventricular end diastolic pressure was 14.  ·  The post-procedure left ventricular end diastolic pressure was 16.  ·  Ost LAD lesion: A SYS STENT MEGATRON 24X3.5MM stent was successfully placed at 11 [place] for 20 sec.  ·  Ost LAD lesion: A .  ·  The estimated blood loss was none.  ·  The PCI was successful.     The procedure log was documented by Documenter: Yun Torres RN and verified by Óscar Mcnamara MD.   [LB]   1746 Troponin I(!): 0.861 [LB]   1909 Troponin I(!): 0.944 [LB]      ED Course User Index  [LB] Melody Mckay DO     Reviewed old labs:      Latest Reference Range & Units 06/04/24 04:46 06/05/24 04:28 06/06/24 05:26 06/14/24 17:02    Hemoglobin 14.0 - 18.0 g/dL 12.4 (L) 12.1 (L) 11.5 (L)  11.5 (L) 10.4 (L)   Hematocrit 40.0 - 54.0 % 39.3 (L) 37.6 (L) 36.5 (L)  36.5 (L) 33.0 (L)      Latest Reference Range & Units 06/05/24 04:28 06/14/24 17:02   Troponin I 0.000 - 0.026 ng/mL 16.818 (H) 0.861 (H)      MIPS Measures     Smoker? No, quit on 6/1/2024     Hypertension: None         Medical Decision Making   7:50 PM: Discussed pt's case with Dr. Bryant (cardiology) who recommends observation overnight.    7:51 PM: Discussed case with Dr. Petit (Hospital Medicine). Dr. He agrees with current care and management of pt and accepts admission.   Admitting Service: Hospital Medicine  Admitting Physician: Dr. He  Admit to: Obs/Tele    7:53 PM: Re-evaluated pt. I have discussed test results, shared treatment plan, and the need for admission with patient and family at bedside. Pt and family express understanding at this time and agree with all information. All questions answered. Pt and family have no further questions or concerns at this time. Pt is ready for admit.              Medical Decision Making  Differential diagnosis: STEMI, NSTEMI hypotension, arrhythmia, symptomatic anemia    ED course:  Patient's EKG shows no ST segment elevation.  However there is Q-waves in V1, V2, V3.  T-wave inversion is present.  Serial EKGs do not show any dynamic changes.  WBC nl.  Hbg/Hct = 10.4/33.0 (Previous = 11.5/36.5), CMP nl.  D-dimer 1.64.  CTA chest: No PE.    BNP 88.   Troponin 0.861, 0.944, 0.819.  Previous  16.818 on 6/5.  Secure chat with on-call cardiology.  Recommend observation and trending troponins.  Hospital medicine placed in observation.    Amount and/or Complexity of Data Reviewed  Independent Historian: EMS     Details: See HPI.  External Data Reviewed: labs, ECG and notes.     Details: See HPI and ED course.  Labs: ordered. Decision-making details documented in ED Course.  Radiology: ordered and independent interpretation  "performed.    Risk  Prescription drug management.        Coding    Prescription Management: I performed a review of the patient's current Rx medication list as input by nursing staff.    Current Discharge Medication List        CONTINUE these medications which have NOT CHANGED    Details   aspirin (ECOTRIN) 81 MG EC tablet Take 1 tablet (81 mg total) by mouth once daily.  Qty: 90 tablet, Refills: 3      atorvastatin (LIPITOR) 80 MG tablet Take 1 tablet (80 mg total) by mouth every evening.  Qty: 90 tablet, Refills: 3      isosorbide mononitrate (IMDUR) 30 MG 24 hr tablet Take 1 tablet (30 mg total) by mouth once daily.  Qty: 30 tablet, Refills: 11      losartan (COZAAR) 25 MG tablet Take 1 tablet (25 mg total) by mouth once daily.  Qty: 90 tablet, Refills: 3    Comments: .      metoprolol tartrate (LOPRESSOR) 50 MG tablet Take 1 tablet (50 mg total) by mouth 2 (two) times daily.  Qty: 180 tablet, Refills: 3    Comments: .      prasugreL (EFFIENT) 10 mg Tab Take 1 tablet (10 mg total) by mouth once daily.  Qty: 30 tablet, Refills: 11    Associated Diagnoses: ST elevation myocardial infarction involving left anterior descending (LAD) coronary artery      nitroGLYCERIN (NITROSTAT) 0.4 MG SL tablet Place 1 tablet (0.4 mg total) under the tongue every 5 (five) minutes as needed for Chest pain.  Qty: 100 tablet, Refills: 3              Discussed case with:Hospital Medicine and Cardiology        Portions of this note may have been created with voice recognition software. Occasional "wrong-word" or "sound-a-like" substitutions may have occurred due to the inherent limitations of voice recognition software. Please, read the note carefully and recognize, using context, where substitutions have occurred.          Clinical Impression       ICD-10-CM ICD-9-CM   1. Chest pain  R07.9 786.50   2. Weakness  R53.1 780.79     ED Disposition     Disposition: Admit to observation, telemetry  Patient condition: Stable      Scribe " Attestation:   Scribe #1: I performed the above scribed service and the documentation accurately describes the services I performed. I attest to the accuracy of the note.     Attending:   Physician Attestation Statement for Scribe #1: I, Melody Mckay, , personally performed the services described in this documentation, as scribed by Emma Coronel, in my presence, and it is both accurate and complete.                 Melody Mckay DO  06/14/24 [id number]

## 2024-06-15 PROBLEM — I25.10 CORONARY ARTERY DISEASE INVOLVING NATIVE CORONARY ARTERY OF NATIVE HEART: Status: ACTIVE | Noted: 2024-06-15

## 2024-06-15 PROBLEM — I21.4 NSTEMI (NON-ST ELEVATED MYOCARDIAL INFARCTION): Status: ACTIVE | Noted: 2024-06-15

## 2024-06-15 LAB
APTT PPP: 29.3 SEC (ref 21–32)
APTT PPP: 37.1 SEC (ref 21–32)
BASOPHILS # BLD AUTO: 0.04 K/UL (ref 0–0.2)
BASOPHILS NFR BLD: 0.5 % (ref 0–1.9)
BSA FOR ECHO PROCEDURE: 1.95 M2
CV ECHO LV RWT: 0.46 CM
DIFFERENTIAL METHOD BLD: ABNORMAL
ECHO LV POSTERIOR WALL: 0.99 CM (ref 0.6–1.1)
EOSINOPHIL # BLD AUTO: 0.2 K/UL (ref 0–0.5)
EOSINOPHIL NFR BLD: 2 % (ref 0–8)
ERYTHROCYTE [DISTWIDTH] IN BLOOD BY AUTOMATED COUNT: 13.5 % (ref 11.5–14.5)
FRACTIONAL SHORTENING: 26 % (ref 28–44)
HCT VFR BLD AUTO: 36.4 % (ref 40–54)
HGB BLD-MCNC: 11.3 G/DL (ref 14–18)
IMM GRANULOCYTES # BLD AUTO: 0.03 K/UL (ref 0–0.04)
IMM GRANULOCYTES NFR BLD AUTO: 0.4 % (ref 0–0.5)
INR PPP: 1 (ref 0.8–1.2)
INTERVENTRICULAR SEPTUM: 0.92 CM (ref 0.6–1.1)
LEFT ATRIUM SIZE: 2.55 CM
LEFT INTERNAL DIMENSION IN SYSTOLE: 3.23 CM (ref 2.1–4)
LEFT VENTRICLE DIASTOLIC VOLUME INDEX: 44.74 ML/M2
LEFT VENTRICLE DIASTOLIC VOLUME: 85.45 ML
LEFT VENTRICLE MASS INDEX: 71 G/M2
LEFT VENTRICLE SYSTOLIC VOLUME INDEX: 22 ML/M2
LEFT VENTRICLE SYSTOLIC VOLUME: 41.95 ML
LEFT VENTRICULAR INTERNAL DIMENSION IN DIASTOLE: 4.35 CM (ref 3.5–6)
LEFT VENTRICULAR MASS: 136.22 G
LYMPHOCYTES # BLD AUTO: 2.1 K/UL (ref 1–4.8)
LYMPHOCYTES NFR BLD: 27.3 % (ref 18–48)
MAGNESIUM SERPL-MCNC: 2.2 MG/DL (ref 1.6–2.6)
MCH RBC QN AUTO: 26.2 PG (ref 27–31)
MCHC RBC AUTO-ENTMCNC: 31 G/DL (ref 32–36)
MCV RBC AUTO: 84 FL (ref 82–98)
MONOCYTES # BLD AUTO: 0.3 K/UL (ref 0.3–1)
MONOCYTES NFR BLD: 4.3 % (ref 4–15)
NEUTROPHILS # BLD AUTO: 5 K/UL (ref 1.8–7.7)
NEUTROPHILS NFR BLD: 65.5 % (ref 38–73)
NRBC BLD-RTO: 0 /100 WBC
OHS QRS DURATION: 78 MS
OHS QRS DURATION: 80 MS
OHS QRS DURATION: 84 MS
OHS QTC CALCULATION: 438 MS
OHS QTC CALCULATION: 460 MS
OHS QTC CALCULATION: 468 MS
PLATELET # BLD AUTO: 559 K/UL (ref 150–450)
PMV BLD AUTO: 8.4 FL (ref 9.2–12.9)
PROTHROMBIN TIME: 11.1 SEC (ref 9–12.5)
RBC # BLD AUTO: 4.32 M/UL (ref 4.6–6.2)
TROPONIN I SERPL DL<=0.01 NG/ML-MCNC: 0.72 NG/ML (ref 0–0.03)
TROPONIN I SERPL DL<=0.01 NG/ML-MCNC: 0.81 NG/ML (ref 0–0.03)
WBC # BLD AUTO: 7.65 K/UL (ref 3.9–12.7)
Z-SCORE OF LEFT VENTRICULAR DIMENSION IN END DIASTOLE: -2.11
Z-SCORE OF LEFT VENTRICULAR DIMENSION IN END SYSTOLE: -0.19

## 2024-06-15 PROCEDURE — 85610 PROTHROMBIN TIME: CPT | Performed by: INTERNAL MEDICINE

## 2024-06-15 PROCEDURE — 83735 ASSAY OF MAGNESIUM: CPT | Performed by: NURSE PRACTITIONER

## 2024-06-15 PROCEDURE — 93005 ELECTROCARDIOGRAM TRACING: CPT

## 2024-06-15 PROCEDURE — 36415 COLL VENOUS BLD VENIPUNCTURE: CPT | Performed by: INTERNAL MEDICINE

## 2024-06-15 PROCEDURE — 36415 COLL VENOUS BLD VENIPUNCTURE: CPT | Performed by: NURSE PRACTITIONER

## 2024-06-15 PROCEDURE — 36415 COLL VENOUS BLD VENIPUNCTURE: CPT | Mod: XB | Performed by: FAMILY MEDICINE

## 2024-06-15 PROCEDURE — 93010 ELECTROCARDIOGRAM REPORT: CPT | Mod: ,,, | Performed by: INTERNAL MEDICINE

## 2024-06-15 PROCEDURE — G0378 HOSPITAL OBSERVATION PER HR: HCPCS

## 2024-06-15 PROCEDURE — 85730 THROMBOPLASTIN TIME PARTIAL: CPT | Performed by: INTERNAL MEDICINE

## 2024-06-15 PROCEDURE — 85730 THROMBOPLASTIN TIME PARTIAL: CPT | Mod: 91 | Performed by: FAMILY MEDICINE

## 2024-06-15 PROCEDURE — 84484 ASSAY OF TROPONIN QUANT: CPT | Performed by: NURSE PRACTITIONER

## 2024-06-15 PROCEDURE — 25000003 PHARM REV CODE 250: Performed by: FAMILY MEDICINE

## 2024-06-15 PROCEDURE — 96366 THER/PROPH/DIAG IV INF ADDON: CPT

## 2024-06-15 PROCEDURE — 25000003 PHARM REV CODE 250: Performed by: INTERNAL MEDICINE

## 2024-06-15 PROCEDURE — 85025 COMPLETE CBC W/AUTO DIFF WBC: CPT | Performed by: INTERNAL MEDICINE

## 2024-06-15 PROCEDURE — 25000003 PHARM REV CODE 250: Performed by: NURSE PRACTITIONER

## 2024-06-15 PROCEDURE — 63600175 PHARM REV CODE 636 W HCPCS: Performed by: INTERNAL MEDICINE

## 2024-06-15 PROCEDURE — 96365 THER/PROPH/DIAG IV INF INIT: CPT

## 2024-06-15 RX ORDER — METOPROLOL TARTRATE 25 MG/1
25 TABLET, FILM COATED ORAL 2 TIMES DAILY
Status: DISCONTINUED | OUTPATIENT
Start: 2024-06-15 | End: 2024-06-16 | Stop reason: HOSPADM

## 2024-06-15 RX ORDER — LANOLIN ALCOHOL/MO/W.PET/CERES
400 CREAM (GRAM) TOPICAL 2 TIMES DAILY
Status: DISCONTINUED | OUTPATIENT
Start: 2024-06-15 | End: 2024-06-16 | Stop reason: HOSPADM

## 2024-06-15 RX ORDER — HEPARIN SODIUM,PORCINE/D5W 25000/250
0-40 INTRAVENOUS SOLUTION INTRAVENOUS CONTINUOUS
Status: DISCONTINUED | OUTPATIENT
Start: 2024-06-15 | End: 2024-06-16 | Stop reason: HOSPADM

## 2024-06-15 RX ADMIN — Medication 400 MG: at 09:06

## 2024-06-15 RX ADMIN — METOPROLOL TARTRATE 50 MG: 50 TABLET, FILM COATED ORAL at 09:06

## 2024-06-15 RX ADMIN — HEPARIN SODIUM 30 UNITS/KG/HR: 10000 INJECTION, SOLUTION INTRAVENOUS at 06:06

## 2024-06-15 RX ADMIN — HEPARIN SODIUM 14 UNITS/KG/HR: 10000 INJECTION, SOLUTION INTRAVENOUS at 06:06

## 2024-06-15 RX ADMIN — Medication 400 MG: at 08:06

## 2024-06-15 RX ADMIN — ISOSORBIDE MONONITRATE 30 MG: 30 TABLET, EXTENDED RELEASE ORAL at 09:06

## 2024-06-15 RX ADMIN — ATORVASTATIN CALCIUM 80 MG: 40 TABLET, FILM COATED ORAL at 08:06

## 2024-06-15 RX ADMIN — HEPARIN SODIUM 12 UNITS/KG/HR: 10000 INJECTION, SOLUTION INTRAVENOUS at 10:06

## 2024-06-15 RX ADMIN — ASPIRIN 81 MG CHEWABLE TABLET 81 MG: 81 TABLET CHEWABLE at 09:06

## 2024-06-15 RX ADMIN — PRASUGREL 10 MG: 10 TABLET, FILM COATED ORAL at 09:06

## 2024-06-15 RX ADMIN — METOPROLOL TARTRATE 25 MG: 25 TABLET, FILM COATED ORAL at 08:06

## 2024-06-15 NOTE — CARE UPDATE
Evaluated patient at bedside  Family present    Chest pain with exertion  No chest pain at rest   Denies chest pain similar to previous chest pain at time of stent placement  Admits to anxiety  Denies h sudden cardiac arrest at young age  Denies dyspnea, nausea/vomiting, diaphoresis, abdominal pain  Good appetite    No acute distress  No respiratory distress  On room air  Clear lungs on auscultation bilateral   No wheezing, rales  Regular heart rate, no murmur  Soft abdomen on palpation  AO3  No lower extremity edema   Dysphoric mood  Normal weight     Cardiology following  Recommend heparin infusion  Monitor chest pain  Cardiac cath if chest pain persists  Prolonged qtc  Mag within normal limits     Hold losartan for hypotension

## 2024-06-15 NOTE — PLAN OF CARE
Problem: Adult Inpatient Plan of Care  Goal: Plan of Care Review  Outcome: Progressing  Goal: Patient-Specific Goal (Individualized)  Outcome: Progressing  Goal: Absence of Hospital-Acquired Illness or Injury  Outcome: Progressing  Goal: Optimal Comfort and Wellbeing  Outcome: Progressing  Goal: Readiness for Transition of Care  Outcome: Progressing     Problem: Acute Coronary Syndrome  Goal: Optimal Adaptation to Illness  Outcome: Progressing  Goal: Absence of Cardiac-Related Pain  Outcome: Progressing  Goal: Normalized Cardiac Rhythm  Outcome: Progressing  Goal: Effective Cardiac Pump Function  Outcome: Progressing  Goal: Adequate Tissue Perfusion  Outcome: Progressing     Problem: Cardiac Catheterization (Diagnostic/Interventional)  Goal: Absence of Bleeding  Outcome: Progressing  Goal: Absence of Contrast-Induced Injury  Outcome: Progressing  Goal: Stable Heart Rate and Rhythm  Outcome: Progressing  Goal: Absence of Embolism Signs and Symptoms  Outcome: Progressing  Goal: Anesthesia/Sedation Recovery  Outcome: Progressing  Goal: Optimal Pain Control and Function  Outcome: Progressing  Goal: Absence of Vascular Access Complication  Outcome: Progressing     Problem: Wound  Goal: Optimal Coping  Outcome: Progressing  Goal: Optimal Functional Ability  Outcome: Progressing  Goal: Absence of Infection Signs and Symptoms  Outcome: Progressing  Goal: Improved Oral Intake  Outcome: Progressing  Goal: Optimal Pain Control and Function  Outcome: Progressing  Goal: Skin Health and Integrity  Outcome: Progressing  Goal: Optimal Wound Healing  Outcome: Progressing     Problem: Fall Injury Risk  Goal: Absence of Fall and Fall-Related Injury  Outcome: Progressing      ----- Message from Suzan Aaron MD sent at 1/15/2018  8:10 AM CST -----  Please inform patient that labs are within normal limit. Her HDL ( good cholesterol is very good so total cholesterol is elevated but this is not a problem)

## 2024-06-15 NOTE — CONSULTS
[facility] - Telemetry ([facility])  Cardiology  Consult Note    Patient Name: Wilfred Beach  MRN: [id number]  Admission Date: 6/14/2024  Hospital Length of Stay: 0 days  Code Status: Full Code   Attending Provider: Rodrigue Fleming MD   Consulting Provider: Manny Villarreal MD   Primary Care Physician: Johana, Primary Doctor  Principal Problem:NSTEMI (non-ST elevated myocardial infarction)    Patient information was obtained from patient, parent, and ER records.     Inpatient consult to Cardiology  Consult performed by: Moses Bryant MD  Consult ordered by: Melody Mckay DO  Reason for consult: chest pain        Subjective:       HPI:  Mr. Beach is a 28 year old male with a past medical history of Stent 6/1/2024 Ostial LAD 99%. Presents with chest pain and fatigue.Patient described his chest pain as pressure-like sensation and worried feeling and concerning his heart was not pumping right. Other associated symptoms include lightheadedness/dizziness with fatigue.  Patient states that he feels better and has stopped smoking since MI. He has no complaints of tightness or SOB. EKG is abnormal but improving from prior EKG's, now with more lateral ischemic changes.Troponin increased to 0.9 yesterday, but is now trending down this morning. Will treat with heparin drip. Repeat echo\      Results for orders placed during the hospital encounter of 06/14/24    Echo    Interpretation Summary    Left Ventricle: The left ventricle is normal in size. Normal wall thickness. There is concentric remodeling. Mild global hypokinesis and regional wall motion abnormalities present. There is mildly reduced systolic function with a visually estimated ejection fraction of 40 - 45%.    Right Ventricle: Normal right ventricular cavity size. Wall thickness is normal. Systolic function is normal.    Limited ECHO for LV function and WMA    The following segments are hypokinetic: apical anterior, apical septal, apical inferior, apical lateral  and apex.      Past Medical History:   Diagnosis Date    Stent 6/1/2024 Ostial LAD 99%        Past Surgical History:   Procedure Laterality Date    LEFT HEART CATHETERIZATION Left 6/1/2024    Procedure: Left heart cath;  Surgeon: Óscar Mcnamara MD;  Location: [facility] CATH LAB;  Service: Cardiology;  Laterality: Left;    LEFT HEART CATHETERIZATION Left 6/2/2024    Procedure: Left heart cath;  Surgeon: Óscar Mcnamara MD;  Location: [facility] CATH LAB;  Service: Cardiology;  Laterality: Left;    ORIF MANDIBULAR FRACTURE Bilateral 5/31/2021    Procedure: ORIF, FRACTURE, MANDIBLE;  Surgeon: Michael Newberry MD;  Location: [facility] OR;  Service: Plastics;  Laterality: Bilateral;  Nasal intubation    PERCUTANEOUS TRANSLUMINAL BALLOON ANGIOPLASTY OF CORONARY ARTERY  6/1/2024    Procedure: Angioplasty-coronary;  Surgeon: Óscar Mcnamara MD;  Location: [facility] CATH LAB;  Service: Cardiology;;    STENT, DRUG ELUTING, SINGLE VESSEL, CORONARY  6/1/2024    Procedure: Stent, Drug Eluting, Single Vessel, Coronary;  Surgeon: Óscar Mcnamara MD;  Location: [facility] CATH LAB;  Service: Cardiology;;    THROMBECTOMY, CORONARY  6/1/2024    Procedure: Thrombectomy, Coronary;  Surgeon: Óscar Mcnamara MD;  Location: [facility] CATH LAB;  Service: Cardiology;;       Review of patient's allergies indicates:  No Known Allergies    No current facility-administered medications on file prior to encounter.     Current Outpatient Medications on File Prior to Encounter   Medication Sig    aspirin (ECOTRIN) 81 MG EC tablet Take 1 tablet (81 mg total) by mouth once daily.    atorvastatin (LIPITOR) 80 MG tablet Take 1 tablet (80 mg total) by mouth every evening.    isosorbide mononitrate (IMDUR) 30 MG 24 hr tablet Take 1 tablet (30 mg total) by mouth once daily.    losartan (COZAAR) 25 MG tablet Take 1 tablet (25 mg total) by mouth once daily.    metoprolol tartrate (LOPRESSOR) 50 MG tablet Take 1 tablet (50 mg total) by mouth 2 (two) times daily.     prasugreL (EFFIENT) 10 mg Tab Take 1 tablet (10 mg total) by mouth once daily.    nitroGLYCERIN (NITROSTAT) 0.4 MG SL tablet Place 1 tablet (0.4 mg total) under the tongue every 5 (five) minutes as needed for Chest pain.     Family History       Problem Relation (Age of Onset)    Coronary artery disease Mother          Tobacco Use    Smoking status: Some Days     Types: Cigarettes    Smokeless tobacco: Never   Substance and Sexual Activity    Alcohol use: Never    Drug use: Never    Sexual activity: Not on file     Review of Systems   Constitutional:  Positive for fatigue. Negative for chills, diaphoresis and fever.   Respiratory:  Negative for cough and shortness of breath.    Cardiovascular:  Positive for chest pain. Negative for palpitations and leg swelling.   Gastrointestinal:  Negative for diarrhea, nausea and vomiting.   Neurological:  Positive for dizziness and light-headedness. Negative for headaches.   Psychiatric/Behavioral:  Negative for agitation. The patient is nervous/anxious.    All other systems reviewed and are negative.     Objective:      Vital Signs (Most Recent):  Temp: 98 °F (36.7 °C) (06/14/24 2312)  Pulse: 67 (06/14/24 2312)  Resp: 18 (06/14/24 2312)  BP: (!) 99/54 (06/14/24 2312)  SpO2: 98 % (06/14/24 2312) Vital Signs (24h Range):  Temp:  [97.5 °F (36.4 °C)-98.6 °F (37 °C)] 98 °F (36.7 °C)  Pulse:  [67-78] 67  Resp:  [16-20] 18  SpO2:  [98 %-100 %] 98 %  BP: ()/(51-79) 99/54      Weight: 81.7 kg (180 lb 1.9 oz)  Body mass index is 29.07 kg/m².     Physical Exam  Vitals and nursing note reviewed.   Constitutional:       General: He is awake. He is not in acute distress.     Appearance: Normal appearance. He is well-developed and well-groomed. He is not ill-appearing, toxic-appearing or diaphoretic.   HENT:      Head: Normocephalic and atraumatic.   Eyes:      Extraocular Movements: Extraocular movements intact.      Conjunctiva/sclera: Conjunctivae normal.   Cardiovascular:       Rate and Rhythm: Normal rate and regular rhythm.      Pulses: Normal pulses.      Heart sounds: Normal heart sounds. No murmur heard.  Pulmonary:      Effort: Pulmonary effort is normal.      Breath sounds: Normal breath sounds. No decreased breath sounds, wheezing, rhonchi or rales.   Abdominal:      General: Bowel sounds are normal.      Palpations: Abdomen is soft.      Tenderness: There is no abdominal tenderness.   Musculoskeletal:      Cervical back: Normal range of motion and neck supple.      Right lower leg: No edema.      Left lower leg: No edema.      Comments: 5/5 strength throughout   Skin:     General: Skin is warm and dry.      Capillary Refill: Capillary refill takes less than 2 seconds.   Neurological:      General: No focal deficit present.      Mental Status: He is alert and oriented to person, place, and time. Mental status is at baseline.      GCS: GCS eye subscore is 4. GCS verbal subscore is 5. GCS motor subscore is 6.      Cranial Nerves: Cranial nerves 2-12 are intact.      Sensory: Sensation is intact.      Motor: Motor function is intact.   Psychiatric:         Mood and Affect: Mood normal.         Behavior: Behavior normal. Behavior is cooperative.   Significant Labs: BMP:   Recent Labs   Lab 06/14/24  1702 06/15/24  0318     --      --    K 3.8  --      --    CO2 23  --    BUN 11  --    CREATININE 0.9  --    CALCIUM 9.4  --    MG  --  2.2   , CMP   Recent Labs   Lab 06/14/24  1702      K 3.8      CO2 23      BUN 11   CREATININE 0.9   CALCIUM 9.4   PROT 7.6   ALBUMIN 3.9   BILITOT 0.8   ALKPHOS 85   AST 22   ALT 34   ANIONGAP 9   , CBC   Recent Labs   Lab 06/14/24  1702 06/15/24  0947   WBC 8.36 7.65   HGB 10.4* 11.3*   HCT 33.0* 36.4*   * 559*   , and Troponin   Recent Labs   Lab 06/14/24  2124 06/14/24  2346 06/15/24  0318   TROPONINI 0.819* 0.815* 0.723*       Significant Imaging: Echocardiogram: 2D echo with color flow doppler: No results  found for this or any previous visit. and Transthoracic echo (TTE) complete (Cupid Only):   Results for orders placed or performed during the hospital encounter of 06/14/24   Echo   Result Value Ref Range    BSA 1.95 m2    LVIDd 4.35 3.5 - 6.0 cm    LV Systolic Volume 41.95 mL    LV Systolic Volume Index 22.0 mL/m2    LVIDs 3.23 2.1 - 4.0 cm    LV Diastolic Volume 85.45 mL    LV Diastolic Volume Index 44.74 mL/m2    IVS 0.92 0.6 - 1.1 cm    FS 26 (A) 28 - 44 %    Left Ventricle Relative Wall Thickness 0.46 cm    Posterior Wall 0.99 0.6 - 1.1 cm    LV mass 136.22 g    LV Mass Index 71 g/m2    LA size 2.55 cm    ZLVIDS -0.19     ZLVIDD -2.11     Narrative      Left Ventricle: The left ventricle is normal in size. Normal wall   thickness. There is concentric remodeling. Mild global hypokinesis and   regional wall motion abnormalities present. There is mildly reduced   systolic function with a visually estimated ejection fraction of 40 - 45%.    Right Ventricle: Normal right ventricular cavity size. Wall thickness   is normal. Systolic function is normal.    Limited ECHO for LV function and WMA    The following segments are hypokinetic: apical anterior, apical septal,   apical inferior, apical lateral and apex.      and EKG:   Assessment and Plan:   Chest Pain   6/15/24  - Continue to monitor   -Treat with heparin drip x 48 hours  - likely DC tomorrow afternoon if stable     NSTEMI (non-ST elevated myocardial infarction)  Patient presents with NSTEMI. Chest pain is currently uncontrolled. ELIA score is 1. Patient is currently on NSTEMI Pathway.       Medical management includes; Beta Blocker, Dual Anti-Platelet therapy, and High Intensity Statin Echo has not been performed.        Coronary artery disease involving native coronary artery of native heart  Patient with known CAD s/p stent placement, which is controlled Will continue  Effient, ASA, and Statin and monitor for S/Sx of angina/ACS. Continue to monitor on  telemetry.       VTE Risk Mitigation (From admission, onward)           Ordered     heparin 25,000 units in dextrose 5% (100 units/ml) IV bolus from bag LOW INTENSITY nomogram - [facility]  As needed (PRN)        Question:  Heparin Infusion Adjustment (DO NOT MODIFY ANSWER)  Answer:  [email or web address]\epic\Images\Pharmacy\HeparinInfusions\heparin LOW INTENSITY nomogram for [facility] [id number]    06/15/24 0908     heparin 25,000 units in dextrose 5% (100 units/ml) IV bolus from bag LOW INTENSITY nomogram - [facility]  As needed (PRN)        Question:  Heparin Infusion Adjustment (DO NOT MODIFY ANSWER)  Answer:  [email or web address]\epic\Images\Pharmacy\HeparinInfusions\heparin LOW INTENSITY nomogram for [facility] [id number]    06/15/24 0908     heparin 25,000 units in dextrose 5% 250 mL (100 units/mL) infusion LOW INTENSITY nomogram - [facility]  Continuous        Question:  Begin at (units/kg/hr)  Answer:  12    06/15/24 0908     IP VTE HIGH RISK PATIENT  Once         06/14/24 2104     Place sequential compression device  Until discontinued         06/14/24 2104                    Thank you for your consult. I will follow-up with patient. Please contact us if you have any additional questions.    Stephanie Freitas  Cardiology   [facility] - Telemetry (Hospital)      I have seen the patient, personally interviewed, and examined the patient at bedside.  I have performed the entire portion of the visit and formulated the assessment and plan as documented by the scribe.       Moses Bryant MD, [facility]  Cardiovascular Disease  [facility], [place]  [phone] (P)

## 2024-06-15 NOTE — H&P
[facility]  History & Physical    Patient Name: Wilfred Beach  MRN: [id number]  Patient Class: OP- Observation  Admission Date: 6/14/2024  Attending Physician: Thomas He MD   Primary Care Provider: Johana Primary Doctor         Patient information was obtained from patient, parent, past medical records, and ER records.     Subjective:     Principal Problem:NSTEMI (non-ST elevated myocardial infarction)    Chief Complaint:   Chief Complaint   Patient presents with    Chest Pain     Brought in by [facility] [place] for chest pain and weakness. Hx of stent placement 2 weeks ago.         HPI: Wilfred Beach is a 28 y.o. male with a PMH  has a past medical history of Stent 6/1/2024 Ostial LAD 99%.  Presented to the ER for evaluation of chest pain fatigue which started around 15 30 this afternoon.  Patient described his chest pain as pressure-like sensation and worried feeling and concerning his heart was not pumping right.  Other associated symptoms include lightheadedness/dizziness with fatigue.  States symptoms currently resolved and denies any complaints at this time.  Denies any other associated symptoms such as palpitations, shortness breath, fever, aches, chills, sweats, abdominal pain, bladder/bowel complaints, or visual disturbances.  Patient reports compliance with all his home medication including: Aspirin (81mg), Effient (10mg), Metoprolol (50mg), Atorvastatin (80mg), Losartan (25mg) and Imdur (30mg).     ER workup revealed elevated troponin of 0.861 with repeat troponin of 0.944.  D-dimer elevated to 1.64.  CTA negative for PE.  BNP 88.  Chest x-ray negative for acute findings.  All other lab work unremarkable.  EKG showed normal sinus rhythm with a ventricular rate of 70 beats per minute with T-wave abnormality and QT/QTC of 406/438.  On-call cardiologist consulted.  Recommended overnight observation.  Patient and family in agreement with treatment plan.   Patient admitted under observation status.    PCP: No, Primary Doctor    Past Medical History:   Diagnosis Date    Stent 6/1/2024 Ostial LAD 99%        Past Surgical History:   Procedure Laterality Date    LEFT HEART CATHETERIZATION Left 6/1/2024    Procedure: Left heart cath;  Surgeon: Óscar Mcnamara MD;  Location: [facility] CATH LAB;  Service: Cardiology;  Laterality: Left;    LEFT HEART CATHETERIZATION Left 6/2/2024    Procedure: Left heart cath;  Surgeon: Óscar Mcnamara MD;  Location: [facility] CATH LAB;  Service: Cardiology;  Laterality: Left;    ORIF MANDIBULAR FRACTURE Bilateral 5/31/2021    Procedure: ORIF, FRACTURE, MANDIBLE;  Surgeon: Michael Newberry MD;  Location: [facility] OR;  Service: Plastics;  Laterality: Bilateral;  Nasal intubation    PERCUTANEOUS TRANSLUMINAL BALLOON ANGIOPLASTY OF CORONARY ARTERY  6/1/2024    Procedure: Angioplasty-coronary;  Surgeon: Óscar Mcnamara MD;  Location: [facility] CATH LAB;  Service: Cardiology;;    STENT, DRUG ELUTING, SINGLE VESSEL, CORONARY  6/1/2024    Procedure: Stent, Drug Eluting, Single Vessel, Coronary;  Surgeon: Óscar Mcnamara MD;  Location: [facility] CATH LAB;  Service: Cardiology;;    THROMBECTOMY, CORONARY  6/1/2024    Procedure: Thrombectomy, Coronary;  Surgeon: Óscar Mcnamara MD;  Location: [facility] CATH LAB;  Service: Cardiology;;       Review of patient's allergies indicates:  No Known Allergies    No current facility-administered medications on file prior to encounter.     Current Outpatient Medications on File Prior to Encounter   Medication Sig    aspirin (ECOTRIN) 81 MG EC tablet Take 1 tablet (81 mg total) by mouth once daily.    atorvastatin (LIPITOR) 80 MG tablet Take 1 tablet (80 mg total) by mouth every evening.    isosorbide mononitrate (IMDUR) 30 MG 24 hr tablet Take 1 tablet (30 mg total) by mouth once daily.    losartan (COZAAR) 25 MG tablet Take 1 tablet (25 mg total) by mouth once daily.    metoprolol tartrate (LOPRESSOR) 50 MG tablet  Take 1 tablet (50 mg total) by mouth 2 (two) times daily.    prasugreL (EFFIENT) 10 mg Tab Take 1 tablet (10 mg total) by mouth once daily.    nitroGLYCERIN (NITROSTAT) 0.4 MG SL tablet Place 1 tablet (0.4 mg total) under the tongue every 5 (five) minutes as needed for Chest pain.     Family History       Problem Relation (Age of Onset)    Coronary artery disease Mother          Tobacco Use    Smoking status: Some Days     Types: Cigarettes    Smokeless tobacco: Never   Substance and Sexual Activity    Alcohol use: Never    Drug use: Never    Sexual activity: Not on file     Review of Systems   Constitutional:  Positive for fatigue. Negative for chills, diaphoresis and fever.   Respiratory:  Negative for cough and shortness of breath.    Cardiovascular:  Positive for chest pain. Negative for palpitations and leg swelling.   Gastrointestinal:  Negative for diarrhea, nausea and vomiting.   Neurological:  Positive for dizziness and light-headedness. Negative for headaches.   Psychiatric/Behavioral:  Negative for agitation. The patient is nervous/anxious.    All other systems reviewed and are negative.    Objective:     Vital Signs (Most Recent):  Temp: 98 °F (36.7 °C) (06/14/24 2312)  Pulse: 67 (06/14/24 2312)  Resp: 18 (06/14/24 2312)  BP: (!) 99/54 (06/14/24 2312)  SpO2: 98 % (06/14/24 2312) Vital Signs (24h Range):  Temp:  [97.5 °F (36.4 °C)-98.6 °F (37 °C)] 98 °F (36.7 °C)  Pulse:  [67-78] 67  Resp:  [16-20] 18  SpO2:  [98 %-100 %] 98 %  BP: ()/(51-79) 99/54     Weight: 81.7 kg (180 lb 1.9 oz)  Body mass index is 29.07 kg/m².     Physical Exam  Vitals and nursing note reviewed.   Constitutional:       General: He is awake. He is not in acute distress.     Appearance: Normal appearance. He is well-developed and well-groomed. He is not ill-appearing, toxic-appearing or diaphoretic.   HENT:      Head: Normocephalic and atraumatic.   Eyes:      Extraocular Movements: Extraocular movements intact.       Conjunctiva/sclera: Conjunctivae normal.   Cardiovascular:      Rate and Rhythm: Normal rate and regular rhythm.      Pulses: Normal pulses.      Heart sounds: Normal heart sounds. No murmur heard.  Pulmonary:      Effort: Pulmonary effort is normal.      Breath sounds: Normal breath sounds. No decreased breath sounds, wheezing, rhonchi or rales.   Abdominal:      General: Bowel sounds are normal.      Palpations: Abdomen is soft.      Tenderness: There is no abdominal tenderness.   Musculoskeletal:      Cervical back: Normal range of motion and neck supple.      Right lower leg: No edema.      Left lower leg: No edema.      Comments: 5/5 strength throughout   Skin:     General: Skin is warm and dry.      Capillary Refill: Capillary refill takes less than 2 seconds.   Neurological:      General: No focal deficit present.      Mental Status: He is alert and oriented to person, place, and time. Mental status is at baseline.      GCS: GCS eye subscore is 4. GCS verbal subscore is 5. GCS motor subscore is 6.      Cranial Nerves: Cranial nerves 2-12 are intact.      Sensory: Sensation is intact.      Motor: Motor function is intact.   Psychiatric:         Mood and Affect: Mood normal.         Behavior: Behavior normal. Behavior is cooperative.              LABS:  Recent Results (from the past 24 hour(s))   CBC auto differential    Collection Time: 06/14/24  5:02 PM   Result Value Ref Range    WBC 8.36 3.90 - 12.70 K/uL    RBC 4.02 (L) 4.60 - 6.20 M/uL    Hemoglobin 10.4 (L) 14.0 - 18.0 g/dL    Hematocrit 33.0 (L) 40.0 - 54.0 %    MCV 82 82 - 98 fL    MCH 25.9 (L) 27.0 - 31.0 pg    MCHC 31.5 (L) 32.0 - 36.0 g/dL    RDW 13.4 11.5 - 14.5 %    Platelets 526 (H) 150 - 450 K/uL    MPV 8.3 (L) 9.2 - 12.9 fL    Immature Granulocytes 0.6 (H) 0.0 - 0.5 %    Gran # (ANC) 5.6 1.8 - 7.7 K/uL    Immature Grans (Abs) 0.05 (H) 0.00 - 0.04 K/uL    Lymph # 2.1 1.0 - 4.8 K/uL    Mono # 0.5 0.3 - 1.0 K/uL    Eos # 0.2 0.0 - 0.5 K/uL    Denisse  # 0.04 0.00 - 0.20 K/uL    nRBC 0 0 /100 WBC    Gran % 66.3 38.0 - 73.0 %    Lymph % 24.9 18.0 - 48.0 %    Mono % 5.9 4.0 - 15.0 %    Eosinophil % 1.8 0.0 - 8.0 %    Basophil % 0.5 0.0 - 1.9 %    Differential Method Automated    Comprehensive metabolic panel    Collection Time: 06/14/24  5:02 PM   Result Value Ref Range    Sodium 139 136 - 145 mmol/L    Potassium 3.8 3.5 - 5.1 mmol/L    Chloride 107 95 - 110 mmol/L    CO2 23 23 - 29 mmol/L    Glucose 107 70 - 110 mg/dL    BUN 11 6 - 20 mg/dL    Creatinine 0.9 0.5 - 1.4 mg/dL    Calcium 9.4 8.7 - 10.5 mg/dL    Total Protein 7.6 6.0 - 8.4 g/dL    Albumin 3.9 3.5 - 5.2 g/dL    Total Bilirubin 0.8 0.1 - 1.0 mg/dL    Alkaline Phosphatase 85 55 - 135 U/L    AST 22 10 - 40 U/L    ALT 34 10 - 44 U/L    eGFR >60 >60 mL/min/1.73 m^2    Anion Gap 9 8 - 16 mmol/L   Troponin I #1    Collection Time: 06/14/24  5:02 PM   Result Value Ref Range    Troponin I 0.861 (H) 0.000 - 0.026 ng/mL   D dimer, quantitative    Collection Time: 06/14/24  5:02 PM   Result Value Ref Range    D-Dimer 1.64 (H) <0.50 mg/L FEU   Protime-INR    Collection Time: 06/14/24  5:02 PM   Result Value Ref Range    Prothrombin Time 11.4 9.0 - 12.5 sec    INR 1.0 0.8 - 1.2   APTT    Collection Time: 06/14/24  5:02 PM   Result Value Ref Range    aPTT 26.8 21.0 - 32.0 sec   BNP    Collection Time: 06/14/24  5:02 PM   Result Value Ref Range    BNP 88 0 - 99 pg/mL   Troponin I #2    Collection Time: 06/14/24  6:08 PM   Result Value Ref Range    Troponin I 0.944 (H) 0.000 - 0.026 ng/mL   Troponin I    Collection Time: 06/14/24  9:24 PM   Result Value Ref Range    Troponin I 0.819 (H) 0.000 - 0.026 ng/mL   Troponin I    Collection Time: 06/14/24 11:46 PM   Result Value Ref Range    Troponin I 0.815 (H) 0.000 - 0.026 ng/mL       RADIOLOGY  CTA Chest Non-Coronary (PE Studies)    Result Date: 6/14/2024  EXAMINATION: CTA CHEST NON CORONARY (PE STUDIES) CLINICAL HISTORY: Pulmonary embolism (PE) suspected, positive D-dimer;  TECHNIQUE: Angiographic technique PE protocol with MIPS and post processing volumetric 3D MIPS Iterative technique with low-dose parameters for diminishing radiation dose as reasonably achievable COMPARISON: Radiographic correlation FINDINGS: No pulmonary embolus identified. No pleural effusion No sizable pulmonary consolidation large central airways patent.  Mild atelectasis lateral right lower lobe     Negative for PE Electronically signed by: Sandi Bustillos Date:    06/14/2024 Time:    18:31    X-Ray Chest AP Portable    Result Date: 6/14/2024  EXAMINATION: XR CHEST AP PORTABLE CLINICAL HISTORY: Chest Pain;. TECHNIQUE: Single frontal portable view of the chest was performed. COMPARISON: None FINDINGS: Support devices: None The lungs are clear, with normal appearance of pulmonary vasculature and no pleural effusion or pneumothorax.     Lungs underinflated otherwise well aerated Electronically signed by: Sandi Bustillos Date:    06/14/2024 Time:    17:56    Echo    Result Date: 6/2/2024    Left Ventricle: The left ventricle is normal in size. Normal wall thickness. There is concentric remodeling. Regional wall motion abnormalities and Global hypokinesis present. There is mildly reduced systolic function with a visually estimated ejection fraction of 40 - 45%. Biplane (2D) method of discs ejection fraction is 43%. There is normal diastolic function.   Right Ventricle: Normal right ventricular cavity size. Wall thickness is normal. Systolic function is normal.   Pulmonary Artery: The estimated pulmonary artery systolic pressure is 29 mmHg.   IVC/SVC: Elevated venous pressure at 15 mmHg.     Cardiac catheterization    Result Date: 6/2/2024    The estimated blood loss was none.   Patent LAD stent   Diffuse distal LAD disease ,resolving thrombus burden The procedure log was documented by Documenter: Virgil Wylie RN and verified by Óscar Mcnamara MD. Date: 6/2/2024  Time: 2:21 PM     Cardiac  catheterization    Result Date: 6/2/2024    The Ost LAD lesion was 99% stenosed with 0% stenosis post-intervention.   The ejection fraction was calculated to be 50%.   The pre-procedure left ventricular end diastolic pressure was 14.   The post-procedure left ventricular end diastolic pressure was 16.   Ost LAD lesion: A SYS STENT MEGATRON [id number] stent was successfully placed at 11 [place] for 20 sec.   Ost LAD lesion: A .   The estimated blood loss was none.   The PCI was successful. The procedure log was documented by Documenter: Yun Torres RN and verified by Óscar Mcnamara MD. Date: 6/2/2024  Time: 10:18 AM       EKG    MICROBIOLOGY    MDM     Amount and/or Complexity of Data Reviewed  Clinical lab tests: reviewed  Tests in the radiology section of CPT®: reviewed  Tests in the medicine section of CPT®: reviewed  Discussion of test results with the performing providers: yes  Decide to obtain previous medical records or to obtain history from someone other than the patient: yes  Obtain history from someone other than the patient: yes  Review and summarize past medical records: yes  Discuss the patient with other providers: yes  Independent visualization of images, tracings, or specimens: yes        Assessment/Plan:     * NSTEMI (non-ST elevated myocardial infarction)  Patient presents with NSTEMI. Chest pain is currently uncontrolled. ELIA score is 1. Patient is currently on NSTEMI Pathway.    EKG reviewed. Troponins reviewed and results noted-   Recent Labs   Lab 06/14/24  2346   TROPONINI 0.815*   .     Lipid panel reviewed and shows-     Lab Results   Component Value Date    LDLCALC 93.2 06/02/2024     Lab Results   Component Value Date    TRIG 74 06/02/2024         Medical management includes; Beta Blocker, Dual Anti-Platelet therapy, and High Intensity Statin Echo has not been performed. Latest ECHO results are as follows- Results for orders placed during the hospital encounter of  06/01/24    Echo    Interpretation Summary    Left Ventricle: The left ventricle is normal in size. Normal wall thickness. There is concentric remodeling. Regional wall motion abnormalities and Global hypokinesis present. There is mildly reduced systolic function with a visually estimated ejection fraction of 40 - 45%. Biplane (2D) method of discs ejection fraction is 43%. There is normal diastolic function.    Right Ventricle: Normal right ventricular cavity size. Wall thickness is normal. Systolic function is normal.    Pulmonary Artery: The estimated pulmonary artery systolic pressure is 29 mmHg.    IVC/SVC: Elevated venous pressure at 15 mmHg.  .   Consult for cardiac rehab is ordered. Patient counseled on lifestyle modifications- reduce salt in diet and cooking, reduce exposure to stress, improve dietary compliance, continue current medications, continue current healthy lifestyle patterns, and return for routine annual checkups. Cardiology is consulted. Plan of care reviewed with cardiology team. Continue to monitor patient closely and adjust therapy as needed.        Coronary artery disease involving native coronary artery of native heart  Patient with known CAD s/p stent placement, which is controlled Will continue  Effient, ASA, and Statin and monitor for S/Sx of angina/ACS. Continue to monitor on telemetry.       VTE Risk Mitigation (From admission, onward)           Ordered     IP VTE HIGH RISK PATIENT  Once         06/14/24 2104     Place sequential compression device  Until discontinued         06/14/24 2104                     //Core Measures   -DVT proph: SCDs, continue ASA and Effient  -Code status Full    -Surrogate:mother      Components of this note were documented using a voice recognition system and are subject to errors not corrected at the time the document was proof read. Please contact the author for any clarifications.       Leonidas He NP  Department of Hospital Medicine  [facility] -  Telemetry ([facility])

## 2024-06-15 NOTE — PHARMACY MED REC
"Admission Medication History     The home medication history was taken by Cate Daily.    You may go to "Admission" then "Reconcile Home Medications" tabs to review and/or act upon these items.     The home medication list has been updated by the Pharmacy department.   Please read ALL comments highlighted in yellow.   Please address this information as you see fit.    Feel free to contact us if you have any questions or require assistance.        Medications listed below were obtained from: Patient/family and Analytic software- [employer],patient had list of medications in phone       LAST [facility] REC COMPLETED:         Catefabricio Daily  [id number]      Current Outpatient Medications on File Prior to Encounter   Medication Sig Dispense Refill Last Dose    aspirin (ECOTRIN) 81 MG EC tablet Take 1 tablet (81 mg total) by mouth once daily. 90 tablet 3 6/14/2024    atorvastatin (LIPITOR) 80 MG tablet Take 1 tablet (80 mg total) by mouth every evening. 90 tablet 3 6/14/2024    isosorbide mononitrate (IMDUR) 30 MG 24 hr tablet Take 1 tablet (30 mg total) by mouth once daily. 30 tablet 11 6/14/2024    losartan (COZAAR) 25 MG tablet Take 1 tablet (25 mg total) by mouth once daily. 90 tablet 3 6/14/2024    metoprolol tartrate (LOPRESSOR) 50 MG tablet Take 1 tablet (50 mg total) by mouth 2 (two) times daily. 180 tablet 3 6/14/2024    prasugreL (EFFIENT) 10 mg Tab Take 1 tablet (10 mg total) by mouth once daily. 30 tablet 11 6/14/2024    nitroGLYCERIN (NITROSTAT) 0.4 MG SL tablet Place 1 tablet (0.4 mg total) under the tongue every 5 (five) minutes as needed for Chest pain. 100 tablet 3 Unknown                           .          "

## 2024-06-15 NOTE — SUBJECTIVE & OBJECTIVE
Past Medical History:   Diagnosis Date    Stent 6/1/2024 Ostial LAD 99%        Past Surgical History:   Procedure Laterality Date    LEFT HEART CATHETERIZATION Left 6/1/2024    Procedure: Left heart cath;  Surgeon: Óscar Mcnamara MD;  Location: [facility] CATH LAB;  Service: Cardiology;  Laterality: Left;    LEFT HEART CATHETERIZATION Left 6/2/2024    Procedure: Left heart cath;  Surgeon: Óscar Mcnamara MD;  Location: [facility] CATH LAB;  Service: Cardiology;  Laterality: Left;    ORIF MANDIBULAR FRACTURE Bilateral 5/31/2021    Procedure: ORIF, FRACTURE, MANDIBLE;  Surgeon: Michael Newberry MD;  Location: [facility] OR;  Service: Plastics;  Laterality: Bilateral;  Nasal intubation    PERCUTANEOUS TRANSLUMINAL BALLOON ANGIOPLASTY OF CORONARY ARTERY  6/1/2024    Procedure: Angioplasty-coronary;  Surgeon: Óscar Mcnamara MD;  Location: [facility] CATH LAB;  Service: Cardiology;;    STENT, DRUG ELUTING, SINGLE VESSEL, CORONARY  6/1/2024    Procedure: Stent, Drug Eluting, Single Vessel, Coronary;  Surgeon: Óscar Mcnamara MD;  Location: [facility] CATH LAB;  Service: Cardiology;;    THROMBECTOMY, CORONARY  6/1/2024    Procedure: Thrombectomy, Coronary;  Surgeon: Óscar Mcnamara MD;  Location: [facility] CATH LAB;  Service: Cardiology;;       Review of patient's allergies indicates:  No Known Allergies    No current facility-administered medications on file prior to encounter.     Current Outpatient Medications on File Prior to Encounter   Medication Sig    aspirin (ECOTRIN) 81 MG EC tablet Take 1 tablet (81 mg total) by mouth once daily.    atorvastatin (LIPITOR) 80 MG tablet Take 1 tablet (80 mg total) by mouth every evening.    isosorbide mononitrate (IMDUR) 30 MG 24 hr tablet Take 1 tablet (30 mg total) by mouth once daily.    losartan (COZAAR) 25 MG tablet Take 1 tablet (25 mg total) by mouth once daily.    metoprolol tartrate (LOPRESSOR) 50 MG tablet Take 1 tablet (50 mg total) by mouth 2 (two) times daily.    prasugreL  (EFFIENT) 10 mg Tab Take 1 tablet (10 mg total) by mouth once daily.    nitroGLYCERIN (NITROSTAT) 0.4 MG SL tablet Place 1 tablet (0.4 mg total) under the tongue every 5 (five) minutes as needed for Chest pain.     Family History       Problem Relation (Age of Onset)    Coronary artery disease Mother          Tobacco Use    Smoking status: Some Days     Types: Cigarettes    Smokeless tobacco: Never   Substance and Sexual Activity    Alcohol use: Never    Drug use: Never    Sexual activity: Not on file     Review of Systems   Constitutional:  Positive for fatigue. Negative for chills, diaphoresis and fever.   Respiratory:  Negative for cough and shortness of breath.    Cardiovascular:  Positive for chest pain. Negative for palpitations and leg swelling.   Gastrointestinal:  Negative for diarrhea, nausea and vomiting.   Neurological:  Positive for dizziness and light-headedness. Negative for headaches.   Psychiatric/Behavioral:  Negative for agitation. The patient is nervous/anxious.    All other systems reviewed and are negative.    Objective:     Vital Signs (Most Recent):  Temp: 98 °F (36.7 °C) (06/14/24 2312)  Pulse: 67 (06/14/24 2312)  Resp: 18 (06/14/24 2312)  BP: (!) 99/54 (06/14/24 2312)  SpO2: 98 % (06/14/24 2312) Vital Signs (24h Range):  Temp:  [97.5 °F (36.4 °C)-98.6 °F (37 °C)] 98 °F (36.7 °C)  Pulse:  [67-78] 67  Resp:  [16-20] 18  SpO2:  [98 %-100 %] 98 %  BP: ()/(51-79) 99/54     Weight: 81.7 kg (180 lb 1.9 oz)  Body mass index is 29.07 kg/m².     Physical Exam  Vitals and nursing note reviewed.   Constitutional:       General: He is awake. He is not in acute distress.     Appearance: Normal appearance. He is well-developed and well-groomed. He is not ill-appearing, toxic-appearing or diaphoretic.   HENT:      Head: Normocephalic and atraumatic.   Eyes:      Extraocular Movements: Extraocular movements intact.      Conjunctiva/sclera: Conjunctivae normal.   Cardiovascular:      Rate and Rhythm:  Normal rate and regular rhythm.      Pulses: Normal pulses.      Heart sounds: Normal heart sounds. No murmur heard.  Pulmonary:      Effort: Pulmonary effort is normal.      Breath sounds: Normal breath sounds. No decreased breath sounds, wheezing, rhonchi or rales.   Abdominal:      General: Bowel sounds are normal.      Palpations: Abdomen is soft.      Tenderness: There is no abdominal tenderness.   Musculoskeletal:      Cervical back: Normal range of motion and neck supple.      Right lower leg: No edema.      Left lower leg: No edema.      Comments: 5/5 strength throughout   Skin:     General: Skin is warm and dry.      Capillary Refill: Capillary refill takes less than 2 seconds.   Neurological:      General: No focal deficit present.      Mental Status: He is alert and oriented to person, place, and time. Mental status is at baseline.      GCS: GCS eye subscore is 4. GCS verbal subscore is 5. GCS motor subscore is 6.      Cranial Nerves: Cranial nerves 2-12 are intact.      Sensory: Sensation is intact.      Motor: Motor function is intact.   Psychiatric:         Mood and Affect: Mood normal.         Behavior: Behavior normal. Behavior is cooperative.              LABS:  Recent Results (from the past 24 hour(s))   CBC auto differential    Collection Time: 06/14/24  5:02 PM   Result Value Ref Range    WBC 8.36 3.90 - 12.70 K/uL    RBC 4.02 (L) 4.60 - 6.20 M/uL    Hemoglobin 10.4 (L) 14.0 - 18.0 g/dL    Hematocrit 33.0 (L) 40.0 - 54.0 %    MCV 82 82 - 98 fL    MCH 25.9 (L) 27.0 - 31.0 pg    MCHC 31.5 (L) 32.0 - 36.0 g/dL    RDW 13.4 11.5 - 14.5 %    Platelets 526 (H) 150 - 450 K/uL    MPV 8.3 (L) 9.2 - 12.9 fL    Immature Granulocytes 0.6 (H) 0.0 - 0.5 %    Gran # (ANC) 5.6 1.8 - 7.7 K/uL    Immature Grans (Abs) 0.05 (H) 0.00 - 0.04 K/uL    Lymph # 2.1 1.0 - 4.8 K/uL    Mono # 0.5 0.3 - 1.0 K/uL    Eos # 0.2 0.0 - 0.5 K/uL    Baso # 0.04 0.00 - 0.20 K/uL    nRBC 0 0 /100 WBC    Gran % 66.3 38.0 - 73.0 %     Lymph % 24.9 18.0 - 48.0 %    Mono % 5.9 4.0 - 15.0 %    Eosinophil % 1.8 0.0 - 8.0 %    Basophil % 0.5 0.0 - 1.9 %    Differential Method Automated    Comprehensive metabolic panel    Collection Time: 06/14/24  5:02 PM   Result Value Ref Range    Sodium 139 136 - 145 mmol/L    Potassium 3.8 3.5 - 5.1 mmol/L    Chloride 107 95 - 110 mmol/L    CO2 23 23 - 29 mmol/L    Glucose 107 70 - 110 mg/dL    BUN 11 6 - 20 mg/dL    Creatinine 0.9 0.5 - 1.4 mg/dL    Calcium 9.4 8.7 - 10.5 mg/dL    Total Protein 7.6 6.0 - 8.4 g/dL    Albumin 3.9 3.5 - 5.2 g/dL    Total Bilirubin 0.8 0.1 - 1.0 mg/dL    Alkaline Phosphatase 85 55 - 135 U/L    AST 22 10 - 40 U/L    ALT 34 10 - 44 U/L    eGFR >60 >60 mL/min/1.73 m^2    Anion Gap 9 8 - 16 mmol/L   Troponin I #1    Collection Time: 06/14/24  5:02 PM   Result Value Ref Range    Troponin I 0.861 (H) 0.000 - 0.026 ng/mL   D dimer, quantitative    Collection Time: 06/14/24  5:02 PM   Result Value Ref Range    D-Dimer 1.64 (H) <0.50 mg/L FEU   Protime-INR    Collection Time: 06/14/24  5:02 PM   Result Value Ref Range    Prothrombin Time 11.4 9.0 - 12.5 sec    INR 1.0 0.8 - 1.2   APTT    Collection Time: 06/14/24  5:02 PM   Result Value Ref Range    aPTT 26.8 21.0 - 32.0 sec   BNP    Collection Time: 06/14/24  5:02 PM   Result Value Ref Range    BNP 88 0 - 99 pg/mL   Troponin I #2    Collection Time: 06/14/24  6:08 PM   Result Value Ref Range    Troponin I 0.944 (H) 0.000 - 0.026 ng/mL   Troponin I    Collection Time: 06/14/24  9:24 PM   Result Value Ref Range    Troponin I 0.819 (H) 0.000 - 0.026 ng/mL   Troponin I    Collection Time: 06/14/24 11:46 PM   Result Value Ref Range    Troponin I 0.815 (H) 0.000 - 0.026 ng/mL       RADIOLOGY  CTA Chest Non-Coronary (PE Studies)    Result Date: 6/14/2024  EXAMINATION: CTA CHEST NON CORONARY (PE STUDIES) CLINICAL HISTORY: Pulmonary embolism (PE) suspected, positive D-dimer; TECHNIQUE: Angiographic technique PE protocol with MIPS and post processing  volumetric 3D MIPS Iterative technique with low-dose parameters for diminishing radiation dose as reasonably achievable COMPARISON: Radiographic correlation FINDINGS: No pulmonary embolus identified. No pleural effusion No sizable pulmonary consolidation large central airways patent.  Mild atelectasis lateral right lower lobe     Negative for PE Electronically signed by: Sandi Bustillos Date:    06/14/2024 Time:    18:31    X-Ray Chest AP Portable    Result Date: 6/14/2024  EXAMINATION: XR CHEST AP PORTABLE CLINICAL HISTORY: Chest Pain;. TECHNIQUE: Single frontal portable view of the chest was performed. COMPARISON: None FINDINGS: Support devices: None The lungs are clear, with normal appearance of pulmonary vasculature and no pleural effusion or pneumothorax.     Lungs underinflated otherwise well aerated Electronically signed by: Sandi Bustillos Date:    06/14/2024 Time:    17:56    Echo    Result Date: 6/2/2024    Left Ventricle: The left ventricle is normal in size. Normal wall thickness. There is concentric remodeling. Regional wall motion abnormalities and Global hypokinesis present. There is mildly reduced systolic function with a visually estimated ejection fraction of 40 - 45%. Biplane (2D) method of discs ejection fraction is 43%. There is normal diastolic function.   Right Ventricle: Normal right ventricular cavity size. Wall thickness is normal. Systolic function is normal.   Pulmonary Artery: The estimated pulmonary artery systolic pressure is 29 mmHg.   IVC/SVC: Elevated venous pressure at 15 mmHg.     Cardiac catheterization    Result Date: 6/2/2024    The estimated blood loss was none.   Patent LAD stent   Diffuse distal LAD disease ,resolving thrombus burden The procedure log was documented by Documenter: Virgil Wylie RN and verified by Óscar Mcnamara MD. Date: 6/2/2024  Time: 2:21 PM     Cardiac catheterization    Result Date: 6/2/2024    The Ost LAD lesion was 99% stenosed with 0%  stenosis post-intervention.   The ejection fraction was calculated to be 50%.   The pre-procedure left ventricular end diastolic pressure was 14.   The post-procedure left ventricular end diastolic pressure was 16.   Ost LAD lesion: A SYS STENT MEGATRON [id number] stent was successfully placed at 11 [place] for 20 sec.   Ost LAD lesion: A .   The estimated blood loss was none.   The PCI was successful. The procedure log was documented by Documenter: Yun Torres RN and verified by Óscar Mcnamara MD. Date: 6/2/2024  Time: 10:18 AM       EKG    MICROBIOLOGY    MDM     Amount and/or Complexity of Data Reviewed  Clinical lab tests: reviewed  Tests in the radiology section of CPT®: reviewed  Tests in the medicine section of CPT®: reviewed  Discussion of test results with the performing providers: yes  Decide to obtain previous medical records or to obtain history from someone other than the patient: yes  Obtain history from someone other than the patient: yes  Review and summarize past medical records: yes  Discuss the patient with other providers: yes  Independent visualization of images, tracings, or specimens: yes

## 2024-06-15 NOTE — PLAN OF CARE
Problem: Adult Inpatient Plan of Care  Goal: Plan of Care Review  Outcome: Progressing  Goal: Patient-Specific Goal (Individualized)  Outcome: Progressing  Goal: Absence of Hospital-Acquired Illness or Injury  Outcome: Progressing  Goal: Optimal Comfort and Wellbeing  Outcome: Progressing  Goal: Readiness for Transition of Care  Outcome: Progressing     Problem: Acute Coronary Syndrome  Goal: Optimal Adaptation to Illness  Outcome: Progressing  Goal: Absence of Cardiac-Related Pain  Outcome: Progressing  Goal: Normalized Cardiac Rhythm  Outcome: Progressing  Goal: Effective Cardiac Pump Function  Outcome: Progressing  Goal: Adequate Tissue Perfusion  Outcome: Progressing     Problem: Cardiac Catheterization (Diagnostic/Interventional)  Goal: Absence of Bleeding  Outcome: Progressing  Goal: Absence of Contrast-Induced Injury  Outcome: Progressing  Goal: Stable Heart Rate and Rhythm  Outcome: Progressing  Goal: Absence of Embolism Signs and Symptoms  Outcome: Progressing  Goal: Anesthesia/Sedation Recovery  Outcome: Progressing  Goal: Optimal Pain Control and Function  Outcome: Progressing  Goal: Absence of Vascular Access Complication  Outcome: Progressing     Problem: Wound  Goal: Optimal Coping  Outcome: Progressing  Goal: Optimal Functional Ability  Outcome: Progressing  Goal: Absence of Infection Signs and Symptoms  Outcome: Progressing  Goal: Improved Oral Intake  Outcome: Progressing  Goal: Optimal Pain Control and Function  Outcome: Progressing  Goal: Skin Health and Integrity  Outcome: Progressing  Goal: Optimal Wound Healing  Outcome: Progressing     Problem: Fall Injury Risk  Goal: Absence of Fall and Fall-Related Injury  Outcome: Progressing

## 2024-06-15 NOTE — ASSESSMENT & PLAN NOTE
Patient with known CAD s/p stent placement, which is controlled Will continue  Effient, ASA, and Statin and monitor for S/Sx of angina/ACS. Continue to monitor on telemetry.

## 2024-06-15 NOTE — CONSULTS
Food & Nutrition Education     Diet Education: Cardiac Nutrition Therapy  Time Spent: 15 minutes.  Learners: Patient     Nutrition Education provided with handouts:  Hearty-Heart Nutrition Therapy, Low-Sodium Nutrition Therapy ([email or web address])    Past Medical History:   Diagnosis Date    Stent 6/1/2024 Ostial LAD 99%      Patient Active Problem List   Diagnosis    Mandible fracture    Recreational drug use    ST elevation myocardial infarction involving left anterior descending (LAD) coronary artery    NSTEMI (non-ST elevated myocardial infarction)    Coronary artery disease involving native coronary artery of native heart     Nutrition Related Social Determinants of Health:   SDOH: Adequate food in home environment       Comments:  Dietitian educated patient on low sodium, general healthful diet r/t recent hospital diagnosis. Recommended a well-balanced diet with a variety of fresh foods, fruits, and vegetables (5 cups/day), whole grains (3 oz/day), and fat-free or low-fat dairy. Discussed reading food packages, food labels, and nutrition facts labels to identify nutrient content of foods.     Discussed the importance of limiting sodium to less than 2,000 mg per day. Recommended salt free seasonings and other herbs and spices in meals to enhance flavor without additional sodium.     Discussed dietary sources of cholesterol, the importance of incorporating healthy fats into the diet, and avoiding saturated and trans fats for heart health. For a generally healthy diet, aim for total fat less than 25-35% of calories. Discussed alternative vegetable protein options for some of her meal throughout the week (Beans, peas, and lentils)     Discussed the importance of fiber (especially soluble fiber), dietary sources, and a goal intake of >20-30g/day.    The pt remained engaged throughout entire nutrition education. He states he is aware of his current poor dietary choices including excessive consumption of fried  "foods and "sweets". He states that he has begun to reduced his meal portion s/p NSTEMI and is ready to implement dietary recommendations into future dietary patterns. The pt states he has a good appetite with PO intake >75%. Dietitian will continue to follow and monitor the nutrition status of the pt during current admission.     NFPE not performed, pt appears well nourished.  All questions and concerns answered.  Provided handout with dietitian's contact information.  *Please re-consult as needed.  Thank You!  Michael Kevin MS, Registration Eligible, Provisional LDN  "

## 2024-06-15 NOTE — HPI
Wilfred Beach is a 28 y.o. male with a PMH  has a past medical history of Stent 6/1/2024 Ostial LAD 99%.  Presented to the ER for evaluation of chest pain fatigue which started around 15 30 this afternoon.  Patient described his chest pain as pressure-like sensation and worried feeling and concerning his heart was not pumping right.  Other associated symptoms include lightheadedness/dizziness with fatigue.  States symptoms currently resolved and denies any complaints at this time.  Denies any other associated symptoms such as palpitations, shortness breath, fever, aches, chills, sweats, abdominal pain, bladder/bowel complaints, or visual disturbances.  Patient reports compliance with all his home medication including: Aspirin (81mg), Effient (10mg), Metoprolol (50mg), Atorvastatin (80mg), Losartan (25mg) and Imdur (30mg).     ER workup revealed elevated troponin of 0.861 with repeat troponin of 0.944.  D-dimer elevated to 1.64.  CTA negative for PE.  BNP 88.  Chest x-ray negative for acute findings.  All other lab work unremarkable.  EKG showed normal sinus rhythm with a ventricular rate of 70 beats per minute with T-wave abnormality and QT/QTC of 406/438.  On-call cardiologist consulted.  Recommended overnight observation.  Patient and family in agreement with treatment plan.  Patient admitted under observation status.    PCP: No, Primary Doctor

## 2024-06-15 NOTE — ASSESSMENT & PLAN NOTE
Patient presents with NSTEMI. Chest pain is currently uncontrolled. ELIA score is 1. Patient is currently on NSTEMI Pathway.    EKG reviewed. Troponins reviewed and results noted-   Recent Labs   Lab 06/14/24  2346   TROPONINI 0.815*   .     Lipid panel reviewed and shows-     Lab Results   Component Value Date    LDLCALC 93.2 06/02/2024     Lab Results   Component Value Date    TRIG 74 06/02/2024         Medical management includes; Beta Blocker, Dual Anti-Platelet therapy, and High Intensity Statin Echo has not been performed. Latest ECHO results are as follows- Results for orders placed during the hospital encounter of 06/01/24    Echo    Interpretation Summary    Left Ventricle: The left ventricle is normal in size. Normal wall thickness. There is concentric remodeling. Regional wall motion abnormalities and Global hypokinesis present. There is mildly reduced systolic function with a visually estimated ejection fraction of 40 - 45%. Biplane (2D) method of discs ejection fraction is 43%. There is normal diastolic function.    Right Ventricle: Normal right ventricular cavity size. Wall thickness is normal. Systolic function is normal.    Pulmonary Artery: The estimated pulmonary artery systolic pressure is 29 mmHg.    IVC/SVC: Elevated venous pressure at 15 mmHg.  .   Consult for cardiac rehab is ordered. Patient counseled on lifestyle modifications- reduce salt in diet and cooking, reduce exposure to stress, improve dietary compliance, continue current medications, continue current healthy lifestyle patterns, and return for routine annual checkups. Cardiology is consulted. Plan of care reviewed with cardiology team. Continue to monitor patient closely and adjust therapy as needed.

## 2024-06-16 VITALS
BODY MASS INDEX: 28.93 KG/M2 | RESPIRATION RATE: 16 BRPM | HEIGHT: 66 IN | DIASTOLIC BLOOD PRESSURE: 62 MMHG | TEMPERATURE: 98 F | HEART RATE: 76 BPM | OXYGEN SATURATION: 97 % | SYSTOLIC BLOOD PRESSURE: 102 MMHG | WEIGHT: 180 LBS

## 2024-06-16 PROBLEM — I21.4 NSTEMI (NON-ST ELEVATED MYOCARDIAL INFARCTION): Status: RESOLVED | Noted: 2024-06-15 | Resolved: 2024-06-16

## 2024-06-16 LAB
ANION GAP SERPL CALC-SCNC: 9 MMOL/L (ref 8–16)
APTT PPP: 42.6 SEC (ref 21–32)
APTT PPP: 43.6 SEC (ref 21–32)
BASOPHILS # BLD AUTO: 0.05 K/UL (ref 0–0.2)
BASOPHILS NFR BLD: 0.6 % (ref 0–1.9)
BUN SERPL-MCNC: 7 MG/DL (ref 6–20)
CALCIUM SERPL-MCNC: 9.5 MG/DL (ref 8.7–10.5)
CHLORIDE SERPL-SCNC: 107 MMOL/L (ref 95–110)
CO2 SERPL-SCNC: 24 MMOL/L (ref 23–29)
CREAT SERPL-MCNC: 0.8 MG/DL (ref 0.5–1.4)
DIFFERENTIAL METHOD BLD: ABNORMAL
EOSINOPHIL # BLD AUTO: 0.2 K/UL (ref 0–0.5)
EOSINOPHIL NFR BLD: 2.5 % (ref 0–8)
ERYTHROCYTE [DISTWIDTH] IN BLOOD BY AUTOMATED COUNT: 13.5 % (ref 11.5–14.5)
EST. GFR  (NO RACE VARIABLE): >60 ML/MIN/1.73 M^2
GLUCOSE SERPL-MCNC: 100 MG/DL (ref 70–110)
HCT VFR BLD AUTO: 36.7 % (ref 40–54)
HGB BLD-MCNC: 11 G/DL (ref 14–18)
IMM GRANULOCYTES # BLD AUTO: 0.03 K/UL (ref 0–0.04)
IMM GRANULOCYTES NFR BLD AUTO: 0.3 % (ref 0–0.5)
LYMPHOCYTES # BLD AUTO: 2.9 K/UL (ref 1–4.8)
LYMPHOCYTES NFR BLD: 32.3 % (ref 18–48)
MCH RBC QN AUTO: 25.5 PG (ref 27–31)
MCHC RBC AUTO-ENTMCNC: 30 G/DL (ref 32–36)
MCV RBC AUTO: 85 FL (ref 82–98)
MONOCYTES # BLD AUTO: 0.5 K/UL (ref 0.3–1)
MONOCYTES NFR BLD: 5.5 % (ref 4–15)
NEUTROPHILS # BLD AUTO: 5.3 K/UL (ref 1.8–7.7)
NEUTROPHILS NFR BLD: 58.8 % (ref 38–73)
NRBC BLD-RTO: 0 /100 WBC
PLATELET # BLD AUTO: 543 K/UL (ref 150–450)
PMV BLD AUTO: 8.6 FL (ref 9.2–12.9)
POTASSIUM SERPL-SCNC: 3.8 MMOL/L (ref 3.5–5.1)
RBC # BLD AUTO: 4.32 M/UL (ref 4.6–6.2)
SODIUM SERPL-SCNC: 140 MMOL/L (ref 136–145)
TROPONIN I SERPL DL<=0.01 NG/ML-MCNC: 0.61 NG/ML (ref 0–0.03)
WBC # BLD AUTO: 9.08 K/UL (ref 3.9–12.7)

## 2024-06-16 PROCEDURE — 36415 COLL VENOUS BLD VENIPUNCTURE: CPT | Performed by: FAMILY MEDICINE

## 2024-06-16 PROCEDURE — 25000003 PHARM REV CODE 250: Performed by: INTERNAL MEDICINE

## 2024-06-16 PROCEDURE — 85730 THROMBOPLASTIN TIME PARTIAL: CPT | Mod: 91 | Performed by: FAMILY MEDICINE

## 2024-06-16 PROCEDURE — 99215 OFFICE O/P EST HI 40 MIN: CPT | Mod: ,,, | Performed by: INTERNAL MEDICINE

## 2024-06-16 PROCEDURE — 96366 THER/PROPH/DIAG IV INF ADDON: CPT

## 2024-06-16 PROCEDURE — 25000003 PHARM REV CODE 250: Performed by: NURSE PRACTITIONER

## 2024-06-16 PROCEDURE — 80048 BASIC METABOLIC PNL TOTAL CA: CPT | Performed by: INTERNAL MEDICINE

## 2024-06-16 PROCEDURE — G0378 HOSPITAL OBSERVATION PER HR: HCPCS

## 2024-06-16 PROCEDURE — 85025 COMPLETE CBC W/AUTO DIFF WBC: CPT | Performed by: INTERNAL MEDICINE

## 2024-06-16 PROCEDURE — 63600175 PHARM REV CODE 636 W HCPCS: Performed by: INTERNAL MEDICINE

## 2024-06-16 PROCEDURE — 25000003 PHARM REV CODE 250: Performed by: FAMILY MEDICINE

## 2024-06-16 PROCEDURE — 84484 ASSAY OF TROPONIN QUANT: CPT | Performed by: INTERNAL MEDICINE

## 2024-06-16 PROCEDURE — 85730 THROMBOPLASTIN TIME PARTIAL: CPT | Performed by: FAMILY MEDICINE

## 2024-06-16 RX ORDER — METOPROLOL TARTRATE 25 MG/1
25 TABLET, FILM COATED ORAL 2 TIMES DAILY
Start: 2024-06-16 | End: 2024-06-19 | Stop reason: SDUPTHER

## 2024-06-16 RX ADMIN — METOPROLOL TARTRATE 25 MG: 25 TABLET, FILM COATED ORAL at 08:06

## 2024-06-16 RX ADMIN — HEPARIN SODIUM 14 UNITS/KG/HR: 10000 INJECTION, SOLUTION INTRAVENOUS at 03:06

## 2024-06-16 RX ADMIN — PRASUGREL 10 MG: 10 TABLET, FILM COATED ORAL at 08:06

## 2024-06-16 RX ADMIN — Medication 400 MG: at 08:06

## 2024-06-16 RX ADMIN — ASPIRIN 81 MG CHEWABLE TABLET 81 MG: 81 TABLET CHEWABLE at 08:06

## 2024-06-16 RX ADMIN — ISOSORBIDE MONONITRATE 30 MG: 30 TABLET, EXTENDED RELEASE ORAL at 08:06

## 2024-06-16 NOTE — HOSPITAL COURSE
6/16  Admitted for chest pain status post stent placement. Cardiology consulted and recommended trending troponin(s) and initiating heparin infusion. Troponin(s) elevated status post pci but trending down. Chest pain resolved.  Repeat echocardiogram with reduced ejection fraction 40-45% with wall motion abnormalities and global hypokinesis. SH recreational drug use.    Elevated d-dimer negative for pulmonary embolism on CTA chest noncoronary.     No acute distress. No respiratory distress. On room air. Clear lungs on auscultation, no wheezing or rales. Normal heart rate, no murmur. Bowel sounds present. Soft abdomen, nontender. AO3. +anxiety. Visitor present    Patient seen and evaluated by me. Patient was determined to be suitable for discharge. Patient deemed stable for discharge to home with nurse practitioner to visit home program. Referral placed for primary care provider.

## 2024-06-16 NOTE — DISCHARGE SUMMARY
[facility] Medicine  Discharge Summary      Patient Name: Wilfred Beach  MRN: [id number]  [facility]: [id number]  Patient Class: OP- Observation  Admission Date: 6/14/2024  Hospital Length of Stay: 0 days  Discharge Date and Time:  06/16/2024 11:44 AM  Attending Physician: Rodrigue Fleming MD   Discharging Provider: Rodrigue Fleming MD  Primary Care Provider: Johana Primary Doctor    Primary Care Team: Networked reference to record PCT     HPI:   Wilfred Beach is a 28 y.o. male with a PMH  has a past medical history of Stent 6/1/2024 Ostial LAD 99%.  Presented to the ER for evaluation of chest pain fatigue which started around 15 30 this afternoon.  Patient described his chest pain as pressure-like sensation and worried feeling and concerning his heart was not pumping right.  Other associated symptoms include lightheadedness/dizziness with fatigue.  States symptoms currently resolved and denies any complaints at this time.  Denies any other associated symptoms such as palpitations, shortness breath, fever, aches, chills, sweats, abdominal pain, bladder/bowel complaints, or visual disturbances.  Patient reports compliance with all his home medication including: Aspirin (81mg), Effient (10mg), Metoprolol (50mg), Atorvastatin (80mg), Losartan (25mg) and Imdur (30mg).     ER workup revealed elevated troponin of 0.861 with repeat troponin of 0.944.  D-dimer elevated to 1.64.  CTA negative for PE.  BNP 88.  Chest x-ray negative for acute findings.  All other lab work unremarkable.  EKG showed normal sinus rhythm with a ventricular rate of 70 beats per minute with T-wave abnormality and QT/QTC of 406/438.  On-call cardiologist consulted.  Recommended overnight observation.  Patient and family in agreement with treatment plan.  Patient admitted under observation status.    PCP: Johana, Primary Doctor    * No surgery found *      Hospital Course:   6/16  Admitted for chest pain status post stent  placement. Cardiology consulted and recommended trending troponin(s) and initiating heparin infusion. Troponin(s) elevated status post pci but trending down. Chest pain resolved.  Repeat echocardiogram with reduced ejection fraction 40-45% with wall motion abnormalities and global hypokinesis. SH recreational drug use.    Elevated d-dimer negative for pulmonary embolism on CTA chest noncoronary.     No acute distress. No respiratory distress. On room air. Clear lungs on auscultation, no wheezing or rales. Normal heart rate, no murmur. Bowel sounds present. Soft abdomen, nontender. AO3. +anxiety. Visitor present    Patient seen and evaluated by me. Patient was determined to be suitable for discharge. Patient deemed stable for discharge to home with nurse practitioner to visit home program. Referral placed for primary care provider.       Goals of Care Treatment Preferences:  Code Status: Full Code      Consults:   Consults (From admission, onward)          Status Ordering Provider     Inpatient consult to Registered Dietitian/Nutritionist  Once        Provider:  (Not yet assigned)    Completed EARL ABRAHAM     Inpatient consult to Social Work/Case Management  Once        Provider:  (Not yet assigned)    Completed EARL ABRAHAM     Inpatient consult to Cardiology  Once        Provider:  (Not yet assigned)    Completed IBETH HAINES            No new Assessment & Plan notes have been filed under this hospital service since the last note was generated.  Service: Hospital Medicine    Final Active Diagnoses:    Diagnosis Date Noted POA    Coronary artery disease involving native coronary artery of native heart [I25.10] 06/15/2024 Unknown      Problems Resolved During this Admission:    Diagnosis Date Noted Date Resolved POA    PRINCIPAL PROBLEM:  NSTEMI (non-ST elevated myocardial infarction) [I21.4] 06/15/2024 06/16/2024 Unknown       Discharged Condition: stable    Disposition:     Follow Up:   Follow-up  Information       Your Primary Care Provider. Schedule an appointment as soon as possible for a visit in 3 day(s).    Why: hospital follow up             Dominga Nelson PA-C. Go to.    Specialty: Cardiology  Why: hospital follow up on 6/19/2024 at 3PM  Contact information:  [address] [place] [address] [address]isha [address] [id number]  [phone]                           Patient Instructions:      Ambulatory referral/consult to Family Practice   Standing Status: Future   Referral Priority: Routine Referral Type: Consultation   Referral Reason: Specialty Services Required   Referred to Provider: ZEHRA SAL Requested Specialty: Family Medicine   Number of Visits Requested: 1     Ambulatory referral/consult to [facility]     Diet Cardiac     Activity as tolerated       Significant Diagnostic Studies: Labs: CMP   Recent Labs   Lab 06/14/24  1702 06/16/24  0600    140   K 3.8 3.8    107   CO2 23 24    100   BUN 11 7   CREATININE 0.9 0.8   CALCIUM 9.4 9.5   PROT 7.6  --    ALBUMIN 3.9  --    BILITOT 0.8  --    ALKPHOS 85  --    AST 22  --    ALT 34  --    ANIONGAP 9 9   , CBC   Recent Labs   Lab 06/14/24  1702 06/15/24  0947 06/16/24  0600   WBC 8.36 7.65 9.08   HGB 10.4* 11.3* 11.0*   HCT 33.0* 36.4* 36.7*   * 559* 543*   , INR   Lab Results   Component Value Date    INR 1.0 06/15/2024    INR 1.0 06/14/2024   , Troponin   Recent Labs   Lab 06/14/24  2346 06/15/24  0318 06/16/24  0600   TROPONINI 0.815* 0.723* 0.615*   , and All labs within the past 24 hours have been reviewed  Radiology: X-Ray: CXR: portable   CT scan:  CTA chest noncoronary   Cardiac Graphics: ECG: normal sinus rhythm and t wave abnormality and Echocardiogram: Transthoracic echo (TTE) complete (Cupid Only):   Results for orders placed or performed during the hospital encounter of 06/14/24   Echo   Result Value Ref Range    BSA 1.95 m2    LVIDd 4.35 3.5 - 6.0 cm    LV Systolic Volume 41.95 mL    LV Systolic  Volume Index 22.0 mL/m2    LVIDs 3.23 2.1 - 4.0 cm    LV Diastolic Volume 85.45 mL    LV Diastolic Volume Index 44.74 mL/m2    IVS 0.92 0.6 - 1.1 cm    FS 26 (A) 28 - 44 %    Left Ventricle Relative Wall Thickness 0.46 cm    Posterior Wall 0.99 0.6 - 1.1 cm    LV mass 136.22 g    LV Mass Index 71 g/m2    LA size 2.55 cm    ZLVIDS -0.19     ZLVIDD -2.11     Narrative      Left Ventricle: The left ventricle is normal in size. Normal wall   thickness. There is concentric remodeling. Mild global hypokinesis and   regional wall motion abnormalities present. There is mildly reduced   systolic function with a visually estimated ejection fraction of 40 - 45%.    Right Ventricle: Normal right ventricular cavity size. Wall thickness   is normal. Systolic function is normal.    Limited ECHO for LV function and WMA    The following segments are hypokinetic: apical anterior, apical septal,   apical inferior, apical lateral and apex.         Pending Diagnostic Studies:       Procedure Component Value Units Date/Time    Brain natriuretic peptide [[id number]] Collected: 06/14/24 1702    Order Status: Sent Lab Status: No result     Specimen: Blood            Medications:  Reconciled Home Medications:      Medication List        CHANGE how you take these medications      metoprolol tartrate 25 MG tablet  Commonly known as: LOPRESSOR  Take 1 tablet (25 mg total) by mouth 2 (two) times daily.  What changed:   medication strength  how much to take            CONTINUE taking these medications      aspirin 81 MG EC tablet  Commonly known as: ECOTRIN  Take 1 tablet (81 mg total) by mouth once daily.     atorvastatin 80 MG tablet  Commonly known as: LIPITOR  Take 1 tablet (80 mg total) by mouth every evening.     isosorbide mononitrate 30 MG 24 hr tablet  Commonly known as: IMDUR  Take 1 tablet (30 mg total) by mouth once daily.     losartan 25 MG tablet  Commonly known as: COZAAR  Take 1 tablet (25 mg total) by mouth once daily.      nitroGLYCERIN 0.4 MG SL tablet  Commonly known as: NITROSTAT  Place 1 tablet (0.4 mg total) under the tongue every 5 (five) minutes as needed for Chest pain.     prasugreL 10 mg Tab  Commonly known as: EFFIENT  Take 1 tablet (10 mg total) by mouth once daily.              Indwelling Lines/Drains at time of discharge:   Lines/Drains/Airways       None                   Time spent on the discharge of patient: 41 minutes         Rodrigue Fleming MD  Department of Hospital Medicine  [facility] Tele[facility] ([facility])

## 2024-06-16 NOTE — DISCHARGE INSTRUCTIONS
A nurse practitioner may be contacting you to assess your status post-hospitalization.       A referral has been placed on your behalf for a primary care provider, Susy Petit nurse juliane.

## 2024-06-16 NOTE — PROGRESS NOTES
[facility] Tele[facility] ([facility])  Cardiology  Consult Note     Patient Name: Wilfred Beach  MRN: [id number]  Admission Date: 6/14/2024  Hospital Length of Stay: 0 days  Code Status: Full Code   Attending Provider: Rodrigue Fleming MD   Consulting Provider: Manny Villarreal MD   Primary Care Physician: Johana, Primary Doctor  Principal Problem:NSTEMI (non-ST elevated myocardial infarction)     Patient information was obtained from patient, parent, and ER records.      Inpatient consult to Cardiology  Consult performed by: Moses Bryant MD  Consult ordered by: Melody Mckay DO  Reason for consult: chest pain           Subjective:         HPI:  Mr. Beach is a 28 year old male with a past medical history of Stent 6/1/2024 Ostial LAD 99%. Presents with chest pain and fatigue.Patient described his chest pain as pressure-like sensation and worried feeling and concerning his heart was not pumping right. Other associated symptoms include lightheadedness/dizziness with fatigue.  Patient states that he feels better and has stopped smoking since MI. He has no complaints of tightness or SOB. EKG is abnormal but improving from prior EKG's, now with more lateral ischemic changes.Troponin increased to 0.9 yesterday, but is now trending down this morning. Will treat with heparin drip. Repeat echo\        Results for orders placed during the hospital encounter of 06/14/24     Echo     Interpretation Summary    Left Ventricle: The left ventricle is normal in size. Normal wall thickness. There is concentric remodeling. Mild global hypokinesis and regional wall motion abnormalities present. There is mildly reduced systolic function with a visually estimated ejection fraction of 40 - 45%.    Right Ventricle: Normal right ventricular cavity size. Wall thickness is normal. Systolic function is normal.    Limited ECHO for LV function and WMA    The following segments are hypokinetic: apical anterior, apical septal, apical inferior,  apical lateral and apex.     Hospital Course:  6/16/24- Patient seen and examined lying in bed. He states that he feels fine.Patient denies any CP, tightness, fatigue or SOB. Troponin has trended down to 0.615. discharge today.             Past Medical History:   Diagnosis Date    Stent 6/1/2024 Ostial LAD 99%                 Past Surgical History:   Procedure Laterality Date    LEFT HEART CATHETERIZATION Left 6/1/2024     Procedure: Left heart cath;  Surgeon: Óscar Mcnamara MD;  Location: [facility] CATH LAB;  Service: Cardiology;  Laterality: Left;    LEFT HEART CATHETERIZATION Left 6/2/2024     Procedure: Left heart cath;  Surgeon: Óscar Mcnamara MD;  Location: [facility] CATH LAB;  Service: Cardiology;  Laterality: Left;    ORIF MANDIBULAR FRACTURE Bilateral 5/31/2021     Procedure: ORIF, FRACTURE, MANDIBLE;  Surgeon: Michael Newberry MD;  Location: [facility] OR;  Service: Plastics;  Laterality: Bilateral;  Nasal intubation    PERCUTANEOUS TRANSLUMINAL BALLOON ANGIOPLASTY OF CORONARY ARTERY   6/1/2024     Procedure: Angioplasty-coronary;  Surgeon: Óscar Mcnamara MD;  Location: [facility] CATH LAB;  Service: Cardiology;;    STENT, DRUG ELUTING, SINGLE VESSEL, CORONARY   6/1/2024     Procedure: Stent, Drug Eluting, Single Vessel, Coronary;  Surgeon: Óscar Mcnamara MD;  Location: [facility] CATH LAB;  Service: Cardiology;;    THROMBECTOMY, CORONARY   6/1/2024     Procedure: Thrombectomy, Coronary;  Surgeon: Óscar Mcnamara MD;  Location: [facility] CATH LAB;  Service: Cardiology;;         Review of patient's allergies indicates:  No Known Allergies     No current facility-administered medications on file prior to encounter.           Current Outpatient Medications on File Prior to Encounter   Medication Sig    aspirin (ECOTRIN) 81 MG EC tablet Take 1 tablet (81 mg total) by mouth once daily.    atorvastatin (LIPITOR) 80 MG tablet Take 1 tablet (80 mg total) by mouth every evening.    isosorbide mononitrate (IMDUR) 30 MG  24 hr tablet Take 1 tablet (30 mg total) by mouth once daily.    losartan (COZAAR) 25 MG tablet Take 1 tablet (25 mg total) by mouth once daily.    metoprolol tartrate (LOPRESSOR) 50 MG tablet Take 1 tablet (50 mg total) by mouth 2 (two) times daily.    prasugreL (EFFIENT) 10 mg Tab Take 1 tablet (10 mg total) by mouth once daily.    nitroGLYCERIN (NITROSTAT) 0.4 MG SL tablet Place 1 tablet (0.4 mg total) under the tongue every 5 (five) minutes as needed for Chest pain.      Family History         Problem Relation (Age of Onset)     Coronary artery disease Mother                   Tobacco Use    Smoking status: Some Days       Types: Cigarettes    Smokeless tobacco: Never   Substance and Sexual Activity    Alcohol use: Never    Drug use: Never    Sexual activity: Not on file      Review of Systems   Constitutional:  Positive for fatigue. Negative for chills, diaphoresis and fever.   Respiratory:  Negative for cough and shortness of breath.    Cardiovascular:  Positive for chest pain. Negative for palpitations and leg swelling.   Gastrointestinal:  Negative for diarrhea, nausea and vomiting.   Neurological:  Positive for dizziness and light-headedness. Negative for headaches.   Psychiatric/Behavioral:  Negative for agitation. The patient is nervous/anxious.    All other systems reviewed and are negative.     Objective:      Vital Signs (Most Recent):  Temp: 98 °F (36.7 °C) (06/14/24 2312)  Pulse: 67 (06/14/24 2312)  Resp: 18 (06/14/24 2312)  BP: (!) 99/54 (06/14/24 2312)  SpO2: 98 % (06/14/24 2312) Vital Signs (24h Range):  Temp:  [97.5 °F (36.4 °C)-98.6 °F (37 °C)] 98 °F (36.7 °C)  Pulse:  [67-78] 67  Resp:  [16-20] 18  SpO2:  [98 %-100 %] 98 %  BP: ()/(51-79) 99/54      Weight: 81.7 kg (180 lb 1.9 oz)  Body mass index is 29.07 kg/m².     Physical Exam  Vitals and nursing note reviewed.   Constitutional:       General: He is awake. He is not in acute distress.     Appearance: Normal appearance. He is  well-developed and well-groomed. He is not ill-appearing, toxic-appearing or diaphoretic.   HENT:      Head: Normocephalic and atraumatic.   Eyes:      Extraocular Movements: Extraocular movements intact.      Conjunctiva/sclera: Conjunctivae normal.   Cardiovascular:      Rate and Rhythm: Normal rate and regular rhythm.      Pulses: Normal pulses.      Heart sounds: Normal heart sounds. No murmur heard.  Pulmonary:      Effort: Pulmonary effort is normal.      Breath sounds: Normal breath sounds. No decreased breath sounds, wheezing, rhonchi or rales.   Abdominal:      General: Bowel sounds are normal.      Palpations: Abdomen is soft.      Tenderness: There is no abdominal tenderness.   Musculoskeletal:      Cervical back: Normal range of motion and neck supple.      Right lower leg: No edema.      Left lower leg: No edema.      Comments: 5/5 strength throughout   Skin:     General: Skin is warm and dry.      Capillary Refill: Capillary refill takes less than 2 seconds.   Neurological:      General: No focal deficit present.      Mental Status: He is alert and oriented to person, place, and time. Mental status is at baseline.      GCS: GCS eye subscore is 4. GCS verbal subscore is 5. GCS motor subscore is 6.      Cranial Nerves: Cranial nerves 2-12 are intact.      Sensory: Sensation is intact.      Motor: Motor function is intact.   Psychiatric:         Mood and Affect: Mood normal.         Behavior: Behavior normal. Behavior is cooperative.   Significant Labs: BMP:        Recent Labs   Lab 06/14/24  1702 06/15/24  0318     --      --    K 3.8  --      --    CO2 23  --    BUN 11  --    CREATININE 0.9  --    CALCIUM 9.4  --    MG  --  2.2   , CMP       Recent Labs   Lab 06/14/24  1702      K 3.8      CO2 23      BUN 11   CREATININE 0.9   CALCIUM 9.4   PROT 7.6   ALBUMIN 3.9   BILITOT 0.8   ALKPHOS 85   AST 22   ALT 34   ANIONGAP 9   , CBC        Recent Labs   Lab  06/14/24  1702 06/15/24  0947   WBC 8.36 7.65   HGB 10.4* 11.3*   HCT 33.0* 36.4*   * 559*   , and Troponin         Recent Labs   Lab 06/14/24  2124 06/14/24  2346 06/15/24  0318   TROPONINI 0.819* 0.815* 0.723*         Significant Imaging: Echocardiogram: 2D echo with color flow doppler: No results found for this or any previous visit. and Transthoracic echo (TTE) complete (Cupid Only):         Results for orders placed or performed during the hospital encounter of 06/14/24   Echo   Result Value Ref Range     BSA 1.95 m2     LVIDd 4.35 3.5 - 6.0 cm     LV Systolic Volume 41.95 mL     LV Systolic Volume Index 22.0 mL/m2     LVIDs 3.23 2.1 - 4.0 cm     LV Diastolic Volume 85.45 mL     LV Diastolic Volume Index 44.74 mL/m2     IVS 0.92 0.6 - 1.1 cm     FS 26 (A) 28 - 44 %     Left Ventricle Relative Wall Thickness 0.46 cm     Posterior Wall 0.99 0.6 - 1.1 cm     LV mass 136.22 g     LV Mass Index 71 g/m2     LA size 2.55 cm     ZLVIDS -0.19       ZLVIDD -2.11       Narrative       Left Ventricle: The left ventricle is normal in size. Normal wall   thickness. There is concentric remodeling. Mild global hypokinesis and   regional wall motion abnormalities present. There is mildly reduced   systolic function with a visually estimated ejection fraction of 40 - 45%.    Right Ventricle: Normal right ventricular cavity size. Wall thickness   is normal. Systolic function is normal.    Limited ECHO for LV function and WMA    The following segments are hypokinetic: apical anterior, apical septal,   apical inferior, apical lateral and apex.       and EKG:   Assessment and Plan:   Chest Pain   6/15/24  - Continue to monitor   -Treat with heparin drip x 48 hours  - likely DC tomorrow afternoon if stable     6/16/24  -Discharge today      NSTEMI (non-ST elevated myocardial infarction)  Patient presents with NSTEMI. Chest pain is currently uncontrolled. ELIA score is 1. Patient is currently on NSTEMI Pathway.        Medical  management includes; Beta Blocker, Dual Anti-Platelet therapy, and High Intensity Statin Echo has not been performed.        Coronary artery disease involving native coronary artery of native heart  Patient with known CAD s/p stent placement, which is controlled Will continue  Effient, ASA, and Statin and monitor for S/Sx of angina/ACS. Continue to monitor on telemetry.         VTE Risk Mitigation (From admission, onward)              Ordered       heparin 25,000 units in dextrose 5% (100 units/ml) IV bolus from bag LOW INTENSITY nomogram - [facility]  As needed (PRN)        Question:  Heparin Infusion Adjustment (DO NOT MODIFY ANSWER)  Answer:  \\[email or web address]\epic\Images\Pharmacy\HeparinInfusions\heparin LOW INTENSITY nomogram for [facility] [id number].pdf    06/15/24 0908       heparin 25,000 units in dextrose 5% (100 units/ml) IV bolus from bag LOW INTENSITY nomogram - [facility]  As needed (PRN)        Question:  Heparin Infusion Adjustment (DO NOT MODIFY ANSWER)  Answer:  \\[email or web address]\epic\Images\Pharmacy\HeparinInfusions\heparin LOW INTENSITY nomogram for [facility] [id number].pdf    06/15/24 0908       heparin 25,000 units in dextrose 5% 250 mL (100 units/mL) infusion LOW INTENSITY nomogram - [facility]  Continuous        Question:  Begin at (units/kg/hr)  Answer:  12    06/15/24 0908       IP VTE HIGH RISK PATIENT  Once         06/14/24 2104       Place sequential compression device  Until discontinued         06/14/24 2104                              I have seen the patient, personally interviewed, and examined the patient at bedside.  I have performed the entire portion of the visit and formulated the assessment and plan as documented by the scribe.         Moses Bryant MD, [facility]  Cardiovascular Disease  [facility], [place]  [phone] (P)

## 2024-06-16 NOTE — NURSING
Discharged order received and reviewed with patient and family. Patient instructed when to take each medication next dose. . IV removed, tele removed. Patient assisted with dressing by staff. Patient walked to front [facility] by staff to be transported home by family.

## 2024-06-16 NOTE — PLAN OF CARE
Patient remained free from falls throughout shift, call bell within reach. Hep gtt continued. No issues over night. Vitals stable.

## 2024-06-16 NOTE — PLAN OF CARE
[facility] - Telemetry (Hospital)  Discharge Final Note    Primary Care Provider: No, Primary Doctor    Expected Discharge Date: 6/16/2024    Final Discharge Note (most recent)       Final Note - 06/16/24 0936          Final Note    Assessment Type Final Discharge Note     Anticipated Discharge Disposition Home or Self Care        Post-Acute Status    Discharge Delays None known at this time                     Important Message from Medicare

## 2024-06-17 ENCOUNTER — TELEPHONE (OUTPATIENT)
Dept: FAMILY MEDICINE | Facility: CLINIC | Age: 29
End: 2024-06-17

## 2024-06-17 NOTE — TELEPHONE ENCOUNTER
----- Message from Rodrigue Fleming MD sent at 6/16/2024 11:47 AM CDT -----  Regarding: hospital f/u appt, est care  Please schedule this patient a hospital follow up appointment and establish care with primary care provider.     Thanks,   Dr. Fleming

## 2024-06-17 NOTE — TELEPHONE ENCOUNTER
----- Message from Alex Camacho sent at 6/17/2024  9:21 AM CDT -----  Contact: Wilfred  .Type:  Patient Returning Call    Who Called: Wilfred  Who Left Message for Patient: Avery   Does the patient know what this is regarding?: not sure  Would the patient rather a call back or a response via [facility]?  Call back   Best Call Back Number: .[phone]     Additional Information:        Thanks

## 2024-06-19 ENCOUNTER — OFFICE VISIT (OUTPATIENT)
Dept: CARDIOLOGY | Facility: CLINIC | Age: 29
End: 2024-06-19

## 2024-06-19 VITALS
HEIGHT: 66 IN | BODY MASS INDEX: 28.91 KG/M2 | HEART RATE: 73 BPM | WEIGHT: 179.88 LBS | DIASTOLIC BLOOD PRESSURE: 62 MMHG | SYSTOLIC BLOOD PRESSURE: 98 MMHG | OXYGEN SATURATION: 99 %

## 2024-06-19 DIAGNOSIS — I21.02 ST ELEVATION MYOCARDIAL INFARCTION INVOLVING LEFT ANTERIOR DESCENDING (LAD) CORONARY ARTERY: ICD-10-CM

## 2024-06-19 DIAGNOSIS — I25.5 ISCHEMIC CARDIOMYOPATHY: ICD-10-CM

## 2024-06-19 DIAGNOSIS — Z95.5 S/P DRUG ELUTING CORONARY STENT PLACEMENT: ICD-10-CM

## 2024-06-19 DIAGNOSIS — I25.118 CORONARY ARTERY DISEASE OF NATIVE ARTERY OF NATIVE HEART WITH STABLE ANGINA PECTORIS: Primary | ICD-10-CM

## 2024-06-19 PROCEDURE — 99999 PR PBB SHADOW E&M-EST. PATIENT-LVL IV: CPT | Mod: PBBFAC,,, | Performed by: PHYSICIAN ASSISTANT

## 2024-06-19 PROCEDURE — 99214 OFFICE O/P EST MOD 30 MIN: CPT | Mod: S$PBB,,, | Performed by: PHYSICIAN ASSISTANT

## 2024-06-19 PROCEDURE — 99214 OFFICE O/P EST MOD 30 MIN: CPT | Mod: PBBFAC | Performed by: PHYSICIAN ASSISTANT

## 2024-06-19 RX ORDER — METOPROLOL TARTRATE 25 MG/1
25 TABLET, FILM COATED ORAL 2 TIMES DAILY
Qty: 180 TABLET | Refills: 3 | Status: SHIPPED | OUTPATIENT
Start: 2024-06-19 | End: 2025-06-19

## 2024-06-19 RX ORDER — ASPIRIN 81 MG/1
81 TABLET ORAL DAILY
Qty: 90 TABLET | Refills: 3 | Status: SHIPPED | OUTPATIENT
Start: 2024-06-19 | End: 2025-06-19

## 2024-06-19 RX ORDER — ATORVASTATIN CALCIUM 80 MG/1
80 TABLET, FILM COATED ORAL NIGHTLY
Qty: 90 TABLET | Refills: 3 | Status: SHIPPED | OUTPATIENT
Start: 2024-06-19 | End: 2025-06-19

## 2024-06-19 RX ORDER — LOSARTAN POTASSIUM 25 MG/1
12.5 TABLET ORAL DAILY
Qty: 45 TABLET | Refills: 3 | Status: SHIPPED | OUTPATIENT
Start: 2024-06-19 | End: 2025-06-19

## 2024-06-19 RX ORDER — ISOSORBIDE MONONITRATE 30 MG/1
30 TABLET, EXTENDED RELEASE ORAL DAILY
Qty: 90 TABLET | Refills: 3 | Status: SHIPPED | OUTPATIENT
Start: 2024-06-19 | End: 2025-06-19

## 2024-06-19 RX ORDER — PRASUGREL 10 MG/1
10 TABLET, FILM COATED ORAL DAILY
Qty: 90 TABLET | Refills: 3 | Status: SHIPPED | OUTPATIENT
Start: 2024-06-19 | End: 2025-06-19

## 2024-06-19 NOTE — PROGRESS NOTES
Subjective   Patient ID:  Wilfred Beach is a 28 y.o. male who presents for follow-up of hospital follow-up    HPI  Mr. Beach is a 28 year old male patient who presents today for hospital follow-up. Patient with recent admission to [facility] with CP/acute anterolateral MI. He subsequently underwent LHC with PCI of ostial LAD and had repeat angio with distal clot burden noted. His medications were optimized and he was discharged home. He was re-admitted to [facility] on 6/15/24 due to continued CP symptoms. CTA was performed was negative for PE and he was discharged after overnight observation/IV heparin. He returns today and states he is doing well overall. No real CP, heaviness, or tightness. No SOB/DENNEY. No LH, dizziness, near syncope, or syncope. Occasional palpitations when feeling anxious. No s/s suggestive of CHF. No access site complaints. Gradually easing back into activity, states she was able to cut grass yesterday.    Reviewed TTE findings, EF 40-45%.     Review of Systems   Constitutional: Negative for chills, decreased appetite, fever and malaise/fatigue.   HENT:  Negative for congestion, hoarse voice and sore throat.    Eyes:  Negative for blurred vision and discharge.   Cardiovascular:  Positive for palpitations (when anxious). Negative for chest pain, claudication, cyanosis, dyspnea on exertion, irregular heartbeat, leg swelling, near-syncope, orthopnea and paroxysmal nocturnal dyspnea.   Respiratory:  Negative for cough, hemoptysis, shortness of breath, snoring, sputum production and wheezing.    Endocrine: Negative for cold intolerance and heat intolerance.   Hematologic/Lymphatic: Negative for bleeding problem. Does not bruise/bleed easily.   Skin:  Negative for rash.   Musculoskeletal:  Negative for arthritis, back pain, joint pain, joint swelling, muscle cramps, muscle weakness and myalgias.   Gastrointestinal:  Negative for abdominal pain, constipation, diarrhea, heartburn, melena and nausea.  "  Genitourinary:  Negative for hematuria.   Neurological:  Negative for dizziness, focal weakness, headaches, light-headedness, loss of balance, numbness, paresthesias, seizures and weakness.   Psychiatric/Behavioral:  Negative for memory loss. The patient does not have insomnia.    Allergic/Immunologic: Negative for hives.     BP 98/62 (BP Location: Right arm, Patient Position: Sitting, BP Method: Large (Manual))   Pulse 73   Ht 5' 6" (1.676 m)   Wt 81.6 kg (179 lb 14.3 oz)   SpO2 99%   BMI 29.04 kg/m²        Objective     Physical Exam  Vitals and nursing note reviewed.   Constitutional:       General: He is not in acute distress.     Appearance: He is well-developed. He is not diaphoretic.   HENT:      Head: Normocephalic and atraumatic.   Eyes:      General:         Right eye: No discharge.         Left eye: No discharge.      Pupils: Pupils are equal, round, and reactive to light.   Cardiovascular:      Rate and Rhythm: Normal rate and regular rhythm.      Heart sounds: Normal heart sounds, S1 normal and S2 normal. No murmur heard.  Pulmonary:      Effort: Pulmonary effort is normal. No respiratory distress.   Abdominal:      General: There is no distension.   Musculoskeletal:      Right lower leg: No edema.      Left lower leg: No edema.   Skin:     General: Skin is warm and dry.      Findings: No erythema.      Comments: L groin access site C/D/I; intact femoral pulse   Neurological:      General: No focal deficit present.      Mental Status: He is alert and oriented to person, place, and time.   Psychiatric:         Mood and Affect: Mood normal.         Behavior: Behavior normal.         Thought Content: Thought content normal.     LHC Results 6/2/24  Summary         The estimated blood loss was none.    Patent LAD stent    Diffuse distal LAD disease ,resolving thrombus burden    TTE Results 6/15/24   Summary  Show Result Comparison     Left Ventricle: The left ventricle is normal in size. Normal wall " thickness. There is concentric remodeling. Mild global hypokinesis and regional wall motion abnormalities present. There is mildly reduced systolic function with a visually estimated ejection fraction of 40 - 45%.    Right Ventricle: Normal right ventricular cavity size. Wall thickness is normal. Systolic function is normal.    Limited ECHO for LV function and WMA    The following segments are hypokinetic: apical anterior, apical septal, apical inferior, apical lateral and apex.        Assessment and Plan     1. Coronary artery disease of native artery of native heart with stable angina pectoris    2. ST elevation myocardial infarction involving left anterior descending (LAD) coronary artery    3. Ischemic cardiomyopathy    4. S/P drug eluting coronary stent placement      Patient doing clinically well. Stable CV wise. No angina. Groin access site WNL. BP soft, drop Losartan to 12.5 mg daily. Continue other CV meds. Log BP at home. RTC 6 weeks with TTE/labs.  Plan:  -Drop Losartan to 12.5 mg daily due to soft BP  -Log BP at home  -Continue other CV meds-counseled on importance of DAPT  -RTC 6 weeks with Dr. Daniels with TTE, lipid, cmp

## 2024-07-31 ENCOUNTER — HOSPITAL ENCOUNTER (OUTPATIENT)
Dept: CARDIOLOGY | Facility: HOSPITAL | Age: 29
Discharge: HOME | End: 2024-07-31
Attending: PHYSICIAN ASSISTANT
Payer: MEDICAID

## 2024-07-31 ENCOUNTER — TELEPHONE (OUTPATIENT)
Dept: CARDIOLOGY | Facility: CLINIC | Age: 29
End: 2024-07-31
Payer: MEDICAID

## 2024-07-31 VITALS
WEIGHT: 179 LBS | HEIGHT: 66 IN | SYSTOLIC BLOOD PRESSURE: 98 MMHG | HEART RATE: 55 BPM | DIASTOLIC BLOOD PRESSURE: 62 MMHG | BODY MASS INDEX: 28.77 KG/M2

## 2024-07-31 DIAGNOSIS — Z95.5 S/P DRUG ELUTING CORONARY STENT PLACEMENT: ICD-10-CM

## 2024-07-31 DIAGNOSIS — I25.5 ISCHEMIC CARDIOMYOPATHY: ICD-10-CM

## 2024-07-31 DIAGNOSIS — I21.02 ST ELEVATION MYOCARDIAL INFARCTION INVOLVING LEFT ANTERIOR DESCENDING (LAD) CORONARY ARTERY: ICD-10-CM

## 2024-07-31 DIAGNOSIS — I25.118 CORONARY ARTERY DISEASE OF NATIVE ARTERY OF NATIVE HEART WITH STABLE ANGINA PECTORIS: ICD-10-CM

## 2024-07-31 LAB
AORTIC ROOT ANNULUS: 2.81 CM
APICAL FOUR CHAMBER EJECTION FRACTION: 58 %
APICAL TWO CHAMBER EJECTION FRACTION: 54 %
ASCENDING AORTA: 1.74 CM
AV INDEX (PROSTH): 1.13
AV MEAN GRADIENT: 4 MMHG
AV PEAK GRADIENT: 8 MMHG
AV VALVE AREA BY VELOCITY RATIO: 2.64 CM²
AV VALVE AREA: 3.37 CM²
AV VELOCITY RATIO: 0.88
BSA FOR ECHO PROCEDURE: 1.94 M2
CV ECHO LV RWT: 0.3 CM
DOP CALC AO PEAK VEL: 1.38 M/S
DOP CALC AO VTI: 28 CM
DOP CALC LVOT AREA: 3 CM2
DOP CALC LVOT DIAMETER: 1.95 CM
DOP CALC LVOT PEAK VEL: 1.22 M/S
DOP CALC LVOT STROKE VOLUME: 94.32 CM3
DOP CALC RVOT PEAK VEL: 0.73 M/S
DOP CALC RVOT VTI: 18 CM
DOP CALCLVOT PEAK VEL VTI: 31.6 CM
E WAVE DECELERATION TIME: 218.56 MSEC
E/A RATIO: 1.6
E/E' RATIO: 6.25 M/S
ECHO LV POSTERIOR WALL: 0.74 CM (ref 0.6–1.1)
FRACTIONAL SHORTENING: 30 % (ref 28–44)
INTERVENTRICULAR SEPTUM: 0.76 CM (ref 0.6–1.1)
IVC DIAMETER: 1.15 CM
IVRT: 62.8 MSEC
LA MAJOR: 5.4 CM
LA MINOR: 4 CM
LA WIDTH: 3.1 CM
LEFT ATRIUM AREA SYSTOLIC (APICAL 2 CHAMBER): 14.47 CM2
LEFT ATRIUM AREA SYSTOLIC (APICAL 4 CHAMBER): 16.45 CM2
LEFT ATRIUM SIZE: 3.02 CM
LEFT ATRIUM VOLUME INDEX MOD: 21.4 ML/M2
LEFT ATRIUM VOLUME INDEX: 19.1 ML/M2
LEFT ATRIUM VOLUME MOD: 40.92 CM3
LEFT ATRIUM VOLUME: 36.57 CM3
LEFT INTERNAL DIMENSION IN SYSTOLE: 3.41 CM (ref 2.1–4)
LEFT VENTRICLE DIASTOLIC VOLUME INDEX: 57.82 ML/M2
LEFT VENTRICLE DIASTOLIC VOLUME: 110.43 ML
LEFT VENTRICLE END DIASTOLIC VOLUME APICAL 2 CHAMBER: 69.76 ML
LEFT VENTRICLE END DIASTOLIC VOLUME APICAL 4 CHAMBER: 85.9 ML
LEFT VENTRICLE END SYSTOLIC VOLUME APICAL 2 CHAMBER: 36.49 ML
LEFT VENTRICLE END SYSTOLIC VOLUME APICAL 4 CHAMBER: 37.74 ML
LEFT VENTRICLE MASS INDEX: 62 G/M2
LEFT VENTRICLE SYSTOLIC VOLUME INDEX: 24.9 ML/M2
LEFT VENTRICLE SYSTOLIC VOLUME: 47.62 ML
LEFT VENTRICULAR INTERNAL DIMENSION IN DIASTOLE: 4.86 CM (ref 3.5–6)
LEFT VENTRICULAR MASS: 119.13 G
LV LATERAL E/E' RATIO: 5.36 M/S
LV SEPTAL E/E' RATIO: 7.5 M/S
LVED V (TEICH): 110.43 ML
LVES V (TEICH): 47.62 ML
LVOT MG: 3.28 MMHG
LVOT MV: 0.86 CM/S
MV PEAK A VEL: 0.47 M/S
MV PEAK E VEL: 0.75 M/S
MV STENOSIS PRESSURE HALF TIME: 63.38 MS
MV VALVE AREA P 1/2 METHOD: 3.47 CM2
OHS CV RV/LV RATIO: 0.58 CM
OHS LV EJECTION FRACTION SIMPSONS BIPLANE MOD: 57 %
PISA TR MAX VEL: 2.54 M/S
PULM VEIN S/D RATIO: 0.77
PV MEAN GRADIENT: 1 MMHG
PV PEAK D VEL: 0.6 M/S
PV PEAK GRADIENT: 4 MMHG
PV PEAK S VEL: 0.46 M/S
PV PEAK VELOCITY: 1.01 M/S
RA MAJOR: 3.85 CM
RA PRESSURE ESTIMATED: 3 MMHG
RA WIDTH: 3.09 CM
RIGHT VENTRICULAR END-DIASTOLIC DIMENSION: 2.81 CM
RV TB RVSP: 6 MMHG
SINUS: 2.62 CM
STJ: 2.42 CM
TDI LATERAL: 0.14 M/S
TDI SEPTAL: 0.1 M/S
TDI: 0.12 M/S
TR MAX PG: 26 MMHG
TRICUSPID ANNULAR PLANE SYSTOLIC EXCURSION: 2.24 CM
TV REST PULMONARY ARTERY PRESSURE: 29 MMHG
Z-SCORE OF LEFT VENTRICULAR DIMENSION IN END DIASTOLE: -1.01
Z-SCORE OF LEFT VENTRICULAR DIMENSION IN END SYSTOLE: 0.23

## 2024-07-31 PROCEDURE — 93306 TTE W/DOPPLER COMPLETE: CPT

## 2024-07-31 PROCEDURE — 93306 TTE W/DOPPLER COMPLETE: CPT | Mod: 26,,, | Performed by: INTERNAL MEDICINE

## 2024-07-31 NOTE — TELEPHONE ENCOUNTER
----- Message from Dominga Nelson PA-C sent at 7/31/2024  3:22 PM CDT -----  Echo reviewed, EF improved, in normal range.

## 2024-08-01 ENCOUNTER — TELEPHONE (OUTPATIENT)
Dept: CARDIOLOGY | Facility: CLINIC | Age: 29
End: 2024-08-01
Payer: MEDICAID

## 2024-08-01 ENCOUNTER — OFFICE VISIT (OUTPATIENT)
Dept: CARDIOLOGY | Facility: CLINIC | Age: 29
End: 2024-08-01
Payer: MEDICAID

## 2024-08-01 VITALS
DIASTOLIC BLOOD PRESSURE: 70 MMHG | HEIGHT: 66 IN | WEIGHT: 169.06 LBS | OXYGEN SATURATION: 97 % | BODY MASS INDEX: 27.17 KG/M2 | SYSTOLIC BLOOD PRESSURE: 102 MMHG | HEART RATE: 69 BPM

## 2024-08-01 DIAGNOSIS — R53.1 WEAKNESS: ICD-10-CM

## 2024-08-01 DIAGNOSIS — I25.118 CORONARY ARTERY DISEASE OF NATIVE ARTERY OF NATIVE HEART WITH STABLE ANGINA PECTORIS: Primary | ICD-10-CM

## 2024-08-01 DIAGNOSIS — Z95.5 S/P DRUG ELUTING CORONARY STENT PLACEMENT: ICD-10-CM

## 2024-08-01 DIAGNOSIS — R07.9 CHEST PAIN, UNSPECIFIED TYPE: ICD-10-CM

## 2024-08-01 DIAGNOSIS — I25.5 ISCHEMIC CARDIOMYOPATHY: ICD-10-CM

## 2024-08-01 PROCEDURE — 99214 OFFICE O/P EST MOD 30 MIN: CPT | Mod: S$PBB,,, | Performed by: STUDENT IN AN ORGANIZED HEALTH CARE EDUCATION/TRAINING PROGRAM

## 2024-08-01 PROCEDURE — 99213 OFFICE O/P EST LOW 20 MIN: CPT | Mod: PBBFAC | Performed by: STUDENT IN AN ORGANIZED HEALTH CARE EDUCATION/TRAINING PROGRAM

## 2024-08-01 PROCEDURE — 3078F DIAST BP <80 MM HG: CPT | Mod: CPTII,,, | Performed by: STUDENT IN AN ORGANIZED HEALTH CARE EDUCATION/TRAINING PROGRAM

## 2024-08-01 PROCEDURE — 99999 PR PBB SHADOW E&M-EST. PATIENT-LVL III: CPT | Mod: PBBFAC,,, | Performed by: STUDENT IN AN ORGANIZED HEALTH CARE EDUCATION/TRAINING PROGRAM

## 2024-08-01 PROCEDURE — 3074F SYST BP LT 130 MM HG: CPT | Mod: CPTII,,, | Performed by: STUDENT IN AN ORGANIZED HEALTH CARE EDUCATION/TRAINING PROGRAM

## 2024-08-01 PROCEDURE — 3008F BODY MASS INDEX DOCD: CPT | Mod: CPTII,,, | Performed by: STUDENT IN AN ORGANIZED HEALTH CARE EDUCATION/TRAINING PROGRAM

## 2024-08-01 PROCEDURE — 1159F MED LIST DOCD IN RCRD: CPT | Mod: CPTII,,, | Performed by: STUDENT IN AN ORGANIZED HEALTH CARE EDUCATION/TRAINING PROGRAM

## 2024-08-01 PROCEDURE — 4010F ACE/ARB THERAPY RXD/TAKEN: CPT | Mod: CPTII,,, | Performed by: STUDENT IN AN ORGANIZED HEALTH CARE EDUCATION/TRAINING PROGRAM

## 2024-08-01 NOTE — TELEPHONE ENCOUNTER
Attempted to contact patient regarding results lvm for patient to give a evelyn back.    ----- Message from Dominga Nelson PA-C sent at 7/31/2024  3:14 PM CDT -----  Labs reviewed. Lipids at goal. CMP stable.    Dr. Daniels to discuss at f/u.

## 2024-08-01 NOTE — PROGRESS NOTES
Section of Cardiology                  Cardiac Clinic Note    HPI:   Wilfred Beach is a 28 y.o. male      8/1/24  Comes in with mom  Was seeing Dominga in clinic  Works as a [employer]  Exercises with bike outside, 2-3 days a christy, 1 hour  Echo EF recovered as of 7/24 EF 57 % from 40-45%  Not taking losartan due to low BP    Denies chest pian, SOB, syncope      Denies tobacco abuse    Family hx: mom- had MI n 2009 and stroke 2010  Maternal uncles- MI         EKG 6/15/24 NSR, sinus arrhythmia, marked T wave abn with anterolateral ischemia     ECHO  Results for orders placed during the hospital encounter of 07/31/24    Echo    Interpretation Summary    Left Ventricle: The left ventricle is normal in size. Normal wall thickness. There is normal systolic function. Biplane (2D) method of discs ejection fraction is 57%. There is normal diastolic function.    Right Ventricle: Normal right ventricular cavity size. Wall thickness is normal. Systolic function is normal.    Left Atrium: Left atrium is dilated.    Pulmonary Artery: The estimated pulmonary artery systolic pressure is 29 mmHg.    IVC/SVC: Normal venous pressure at 3 mmHg.       Echo 6/24    Left Ventricle: The left ventricle is normal in size. Normal wall thickness. There is concentric remodeling. Mild global hypokinesis and regional wall motion abnormalities present. There is mildly reduced systolic function with a visually estimated ejection fraction of 40 - 45%.    Right Ventricle: Normal right ventricular cavity size. Wall thickness is normal. Systolic function is normal.    Limited ECHO for LV function and WMA    The following segments are hypokinetic: apical anterior, apical septal, apical inferior, apical lateral and apex.    STRESS TEST No results found for this or any previous visit.       [facility] Results for orders placed during the hospital encounter of 06/01/24    Cardiac catheterization    Conclusion    The estimated blood loss was  none.    Patent LAD stent    Diffuse distal LAD disease ,resolving thrombus burden    The procedure log was documented by Documenter: Virgil Wylie RN and verified by Óscar Mcnamara MD.    Date: 6/2/2024  Time: 2:21 PM            ROS: All 10 systems reviewed. Please refer to the HPI for pertinent positives. All other systems negative.     Past Medical History  Past Medical History:   Diagnosis Date    Stent 6/1/2024 Ostial LAD 99%        Surgical History  Past Surgical History:   Procedure Laterality Date    LEFT HEART CATHETERIZATION Left 6/1/2024    Procedure: Left heart cath;  Surgeon: Óscar Mcnamara MD;  Location: [facility] CATH LAB;  Service: Cardiology;  Laterality: Left;    LEFT HEART CATHETERIZATION Left 6/2/2024    Procedure: Left heart cath;  Surgeon: Óscar Mcnamara MD;  Location: [facility] CATH LAB;  Service: Cardiology;  Laterality: Left;    ORIF MANDIBULAR FRACTURE Bilateral 5/31/2021    Procedure: ORIF, FRACTURE, MANDIBLE;  Surgeon: Michael Newberry MD;  Location: [facility] OR;  Service: Plastics;  Laterality: Bilateral;  Nasal intubation    PERCUTANEOUS TRANSLUMINAL BALLOON ANGIOPLASTY OF CORONARY ARTERY  6/1/2024    Procedure: Angioplasty-coronary;  Surgeon: Óscar Mcnamara MD;  Location: [facility] CATH LAB;  Service: Cardiology;;    STENT, DRUG ELUTING, SINGLE VESSEL, CORONARY  6/1/2024    Procedure: Stent, Drug Eluting, Single Vessel, Coronary;  Surgeon: Óscar Mcnamara MD;  Location: [facility] CATH LAB;  Service: Cardiology;;    THROMBECTOMY, CORONARY  6/1/2024    Procedure: Thrombectomy, Coronary;  Surgeon: Óscar Mcnamara MD;  Location: [facility] CATH LAB;  Service: Cardiology;;          Allergies:   Review of patient's allergies indicates:  No Known Allergies    Social History:  Social History     Socioeconomic History    Marital status: Single   Tobacco Use    Smoking status: Some Days     Types: Cigarettes    Smokeless tobacco: Never   Substance and Sexual Activity    Alcohol use: Never     "Drug use: Never     Social Determinants of Health     Transportation Needs: No Transportation Needs (6/3/2024)    TRANSPORTATION NEEDS     Transportation : No       Family History:  family history includes Coronary artery disease in his mother.    Home Medications:  Current Outpatient Medications on File Prior to Visit   Medication Sig Dispense Refill    aspirin (ECOTRIN) 81 MG EC tablet Take 1 tablet (81 mg total) by mouth once daily. 90 tablet 3    atorvastatin (LIPITOR) 80 MG tablet Take 1 tablet (80 mg total) by mouth every evening. 90 tablet 3    isosorbide mononitrate (IMDUR) 30 MG 24 hr tablet Take 1 tablet (30 mg total) by mouth once daily. 90 tablet 3    losartan (COZAAR) 25 MG tablet Take 0.5 tablets (12.5 mg total) by mouth once daily. 45 tablet 3    metoprolol tartrate (LOPRESSOR) 25 MG tablet Take 1 tablet (25 mg total) by mouth 2 (two) times daily. 180 tablet 3    nitroGLYCERIN (NITROSTAT) 0.4 MG SL tablet Place 1 tablet (0.4 mg total) under the tongue every 5 (five) minutes as needed for Chest pain. 100 tablet 3    prasugreL (EFFIENT) 10 mg Tab Take 1 tablet (10 mg total) by mouth once daily. 90 tablet 3     No current facility-administered medications on file prior to visit.       Physical exam:  /70 (BP Location: Left arm, Patient Position: Sitting, BP Method: Medium (Manual))   Pulse 69   Ht 5' 6" (1.676 m)   Wt 76.7 kg (169 lb 1.5 oz)   SpO2 97%   BMI 27.29 kg/m²         General: Pt is a 28 y.o. year old male who is AAOx3, in NAD, is pleasant, well nourished, looks stated age  HEENT: PERRL, EOMI, Oral mucosa pink & moist  CVS  No abnormal cardiac pulsations noted on inspection. JVP not raised. The apical impulse is normal on palpation, and is located in the left 5th intercostal space in the mid - clavicular line. No palpable thrills or abnormal pulsations noted. RR, S1 - S2 heard, no murmurs, rubs or gallops appreciated.   PUL : CTA B/L. No wheezes/crackles heard   ABD : BS +, soft. No " tenderness elicited   LE : No C/C/E. Distal Pulses palpable B/L         LABS:    Chemistry:   Lab Results   Component Value Date     07/31/2024    K 4.0 07/31/2024     07/31/2024    CO2 29 07/31/2024    BUN 11 07/31/2024    CREATININE 1.0 07/31/2024    GLUCOSE 89 06/28/2021    CALCIUM 9.6 07/31/2024     Cardiac Markers:   Lab Results   Component Value Date    TROPONINI 0.615 (H) 06/16/2024     Cardiac Markers (Last 3):   Lab Results   Component Value Date    TROPONINI 0.615 (H) 06/16/2024    TROPONINI 0.723 (H) 06/15/2024    TROPONINI 0.815 (H) 06/14/2024     CBC:   Lab Results   Component Value Date    WBC 9.08 06/16/2024    HGB 11.0 (L) 06/16/2024    HCT 36.7 (L) 06/16/2024    MCV 85 06/16/2024     (H) 06/16/2024     Lipids:   Lab Results   Component Value Date    CHOL 73 (L) 07/31/2024    TRIG 67 07/31/2024    HDL 29 (L) 07/31/2024     Coagulation:   Lab Results   Component Value Date    INR 1.0 06/15/2024    APTT 43.6 (H) 06/16/2024           Assessment        1. Coronary artery disease of native artery of native heart with stable angina pectoris    2. Ischemic cardiomyopathy    3. S/P drug eluting coronary stent placement    4. Chest pain, unspecified type    5. Weakness         Plan:    CAD  History of anterior STEMI   S/p PCI ostial LAD, significant distal LAD disease  Continue Effient, and aspirin, statin, Imdur     ICM  Echo EF recovered as of 7/24 EF 57 % from 40-45%  Could not tolerate losartan due to low blood pressure   Continue Lopressor 25 b.i.d.    Low salt, low fat diet  Exercise as tolerated, at least 30 min daily     This note was prepared using voice recognition system and is likely to have sound alike errors that may have been overlooked even after proofreading.     I have reviewed all pertinent chart information.  Plans and recommendations have been formulated under my direct supervision. All questions answered and patient voiced understanding.   If symptoms persist go to the  ED.    RTC in 3 m         BerylAdrien AGRAWAL. MD [facility]  Cardiology

## 2024-08-01 NOTE — TELEPHONE ENCOUNTER
Attempted to contact patient regarding results lvm for patient to call back.    ----- Message from Dominga Nelson PA-C sent at 7/31/2024  3:22 PM CDT -----  Echo reviewed, EF improved, in normal range.

## 2024-09-02 PROBLEM — I21.02 ST ELEVATION MYOCARDIAL INFARCTION INVOLVING LEFT ANTERIOR DESCENDING (LAD) CORONARY ARTERY: Status: RESOLVED | Noted: 2024-06-01 | Resolved: 2024-09-02

## 2024-11-14 ENCOUNTER — OFFICE VISIT (OUTPATIENT)
Dept: CARDIOLOGY | Facility: CLINIC | Age: 29
End: 2024-11-14
Payer: MEDICAID

## 2024-11-14 VITALS
WEIGHT: 184.31 LBS | BODY MASS INDEX: 29.62 KG/M2 | OXYGEN SATURATION: 98 % | HEIGHT: 66 IN | HEART RATE: 70 BPM | SYSTOLIC BLOOD PRESSURE: 102 MMHG | DIASTOLIC BLOOD PRESSURE: 67 MMHG

## 2024-11-14 DIAGNOSIS — I25.5 ISCHEMIC CARDIOMYOPATHY: ICD-10-CM

## 2024-11-14 DIAGNOSIS — R07.9 CHEST PAIN, UNSPECIFIED TYPE: ICD-10-CM

## 2024-11-14 DIAGNOSIS — I25.118 CORONARY ARTERY DISEASE OF NATIVE ARTERY OF NATIVE HEART WITH STABLE ANGINA PECTORIS: Primary | ICD-10-CM

## 2024-11-14 DIAGNOSIS — I25.2 HISTORY OF ST ELEVATION MYOCARDIAL INFARCTION (STEMI): ICD-10-CM

## 2024-11-14 DIAGNOSIS — Z95.5 S/P DRUG ELUTING CORONARY STENT PLACEMENT: ICD-10-CM

## 2024-11-14 PROCEDURE — 99213 OFFICE O/P EST LOW 20 MIN: CPT | Mod: PBBFAC | Performed by: STUDENT IN AN ORGANIZED HEALTH CARE EDUCATION/TRAINING PROGRAM

## 2024-11-14 PROCEDURE — 99214 OFFICE O/P EST MOD 30 MIN: CPT | Mod: S$PBB,,, | Performed by: STUDENT IN AN ORGANIZED HEALTH CARE EDUCATION/TRAINING PROGRAM

## 2024-11-14 PROCEDURE — 3078F DIAST BP <80 MM HG: CPT | Mod: CPTII,,, | Performed by: STUDENT IN AN ORGANIZED HEALTH CARE EDUCATION/TRAINING PROGRAM

## 2024-11-14 PROCEDURE — 3074F SYST BP LT 130 MM HG: CPT | Mod: CPTII,,, | Performed by: STUDENT IN AN ORGANIZED HEALTH CARE EDUCATION/TRAINING PROGRAM

## 2024-11-14 PROCEDURE — 4010F ACE/ARB THERAPY RXD/TAKEN: CPT | Mod: CPTII,,, | Performed by: STUDENT IN AN ORGANIZED HEALTH CARE EDUCATION/TRAINING PROGRAM

## 2024-11-14 PROCEDURE — 1159F MED LIST DOCD IN RCRD: CPT | Mod: CPTII,,, | Performed by: STUDENT IN AN ORGANIZED HEALTH CARE EDUCATION/TRAINING PROGRAM

## 2024-11-14 PROCEDURE — 3008F BODY MASS INDEX DOCD: CPT | Mod: CPTII,,, | Performed by: STUDENT IN AN ORGANIZED HEALTH CARE EDUCATION/TRAINING PROGRAM

## 2024-11-14 PROCEDURE — 99999 PR PBB SHADOW E&M-EST. PATIENT-LVL III: CPT | Mod: PBBFAC,,, | Performed by: STUDENT IN AN ORGANIZED HEALTH CARE EDUCATION/TRAINING PROGRAM

## 2024-11-14 RX ORDER — CLOPIDOGREL BISULFATE 75 MG/1
75 TABLET ORAL DAILY
Qty: 90 TABLET | Refills: 3 | Status: SHIPPED | OUTPATIENT
Start: 2024-11-14 | End: 2025-11-14

## 2024-11-14 NOTE — PROGRESS NOTES
Section of Cardiology                  Cardiac Clinic Note    HPI:   Wilfred Beach is a 29 y.o. male      8/1/24  Comes in with mom  Was seeing Dominga in clinic  Works as a [employer]  Exercises with bike outside, 2-3 days a christy, 1 hour  Echo EF recovered as of 7/24 EF 57 % from 40-45%  Not taking losartan due to low BP    Denies chest pian, SOB, syncope      Denies tobacco abuse    Family hx: mom- had MI n 2009 and stroke 2010  Maternal uncles- MI       11/14/24          EKG 6/15/24 NSR, sinus arrhythmia, marked T wave abn with anterolateral ischemia     ECHO  Results for orders placed during the hospital encounter of 07/31/24    Echo    Interpretation Summary    Left Ventricle: The left ventricle is normal in size. Normal wall thickness. There is normal systolic function. Biplane (2D) method of discs ejection fraction is 57%. There is normal diastolic function.    Right Ventricle: Normal right ventricular cavity size. Wall thickness is normal. Systolic function is normal.    Left Atrium: Left atrium is dilated.    Pulmonary Artery: The estimated pulmonary artery systolic pressure is 29 mmHg.    IVC/SVC: Normal venous pressure at 3 mmHg.       Echo 6/24    Left Ventricle: The left ventricle is normal in size. Normal wall thickness. There is concentric remodeling. Mild global hypokinesis and regional wall motion abnormalities present. There is mildly reduced systolic function with a visually estimated ejection fraction of 40 - 45%.    Right Ventricle: Normal right ventricular cavity size. Wall thickness is normal. Systolic function is normal.    Limited ECHO for LV function and WMA    The following segments are hypokinetic: apical anterior, apical septal, apical inferior, apical lateral and apex.    STRESS TEST No results found for this or any previous visit.       [facility] Results for orders placed during the hospital encounter of 06/01/24    Cardiac catheterization    Conclusion    The estimated  blood loss was none.    Patent LAD stent    Diffuse distal LAD disease ,resolving thrombus burden    The procedure log was documented by Documenter: Virgil Wylie RN and verified by Óscar Mcnamara MD.    Date: 6/2/2024  Time: 2:21 PM            ROS: All 10 systems reviewed. Please refer to the HPI for pertinent positives. All other systems negative.     Past Medical History  Past Medical History:   Diagnosis Date    Stent 6/1/2024 Ostial LAD 99%        Surgical History  Past Surgical History:   Procedure Laterality Date    LEFT HEART CATHETERIZATION Left 6/1/2024    Procedure: Left heart cath;  Surgeon: Óscar Mcnamara MD;  Location: [facility] CATH LAB;  Service: Cardiology;  Laterality: Left;    LEFT HEART CATHETERIZATION Left 6/2/2024    Procedure: Left heart cath;  Surgeon: Óscar Mcnamara MD;  Location: [facility] CATH LAB;  Service: Cardiology;  Laterality: Left;    ORIF MANDIBULAR FRACTURE Bilateral 5/31/2021    Procedure: ORIF, FRACTURE, MANDIBLE;  Surgeon: Michael Newberry MD;  Location: [facility] OR;  Service: Plastics;  Laterality: Bilateral;  Nasal intubation    PERCUTANEOUS TRANSLUMINAL BALLOON ANGIOPLASTY OF CORONARY ARTERY  6/1/2024    Procedure: Angioplasty-coronary;  Surgeon: Óscar Mcnamara MD;  Location: [facility] CATH LAB;  Service: Cardiology;;    STENT, DRUG ELUTING, SINGLE VESSEL, CORONARY  6/1/2024    Procedure: Stent, Drug Eluting, Single Vessel, Coronary;  Surgeon: Óscar Mcnamara MD;  Location: [facility] CATH LAB;  Service: Cardiology;;    THROMBECTOMY, CORONARY  6/1/2024    Procedure: Thrombectomy, Coronary;  Surgeon: Óscar Mcnamara MD;  Location: [facility] CATH LAB;  Service: Cardiology;;          Allergies:   Review of patient's allergies indicates:  No Known Allergies    Social History:  Social History     Socioeconomic History    Marital status: Single   Tobacco Use    Smoking status: Some Days     Types: Cigarettes    Smokeless tobacco: Never   Substance and Sexual Activity    Alcohol  "use: Never    Drug use: Never     Social Drivers of Health     Transportation Needs: No Transportation Needs (6/3/2024)    TRANSPORTATION NEEDS     Transportation : No       Family History:  family history includes Coronary artery disease in his mother.    Home Medications:  Current Outpatient Medications on File Prior to Visit   Medication Sig Dispense Refill    aspirin (ECOTRIN) 81 MG EC tablet Take 1 tablet (81 mg total) by mouth once daily. 90 tablet 3    atorvastatin (LIPITOR) 80 MG tablet Take 1 tablet (80 mg total) by mouth every evening. 90 tablet 3    isosorbide mononitrate (IMDUR) 30 MG 24 hr tablet Take 1 tablet (30 mg total) by mouth once daily. 90 tablet 3    metoprolol tartrate (LOPRESSOR) 25 MG tablet Take 1 tablet (25 mg total) by mouth 2 (two) times daily. 180 tablet 3    nitroGLYCERIN (NITROSTAT) 0.4 MG SL tablet Place 1 tablet (0.4 mg total) under the tongue every 5 (five) minutes as needed for Chest pain. 100 tablet 3    prasugreL (EFFIENT) 10 mg Tab Take 1 tablet (10 mg total) by mouth once daily. 90 tablet 3     No current facility-administered medications on file prior to visit.       Physical exam:  /67 (BP Location: Left arm, Patient Position: Sitting)   Pulse 70   Ht 5' 6" (1.676 m)   Wt 83.6 kg (184 lb 4.9 oz)   SpO2 98%   BMI 29.75 kg/m²         General: Pt is a 29 y.o. year old male who is AAOx3, in NAD, is pleasant, well nourished, looks stated age  HEENT: PERRL, EOMI, Oral mucosa pink & moist  CVS  No abnormal cardiac pulsations noted on inspection. JVP not raised. The apical impulse is normal on palpation, and is located in the left 5th intercostal space in the mid - clavicular line. No palpable thrills or abnormal pulsations noted. RR, S1 - S2 heard, no murmurs, rubs or gallops appreciated.   PUL : CTA B/L. No wheezes/crackles heard   ABD : BS +, soft. No tenderness elicited   LE : No C/C/E. Distal Pulses palpable B/L         LABS:    Chemistry:   Lab Results   Component " Value Date     07/31/2024    K 4.0 07/31/2024     07/31/2024    CO2 29 07/31/2024    BUN 11 07/31/2024    CREATININE 1.0 07/31/2024    GLUCOSE 89 06/28/2021    CALCIUM 9.6 07/31/2024     Cardiac Markers:   Lab Results   Component Value Date    TROPONINI 0.615 (H) 06/16/2024     Cardiac Markers (Last 3):   Lab Results   Component Value Date    TROPONINI 0.615 (H) 06/16/2024    TROPONINI 0.723 (H) 06/15/2024    TROPONINI 0.815 (H) 06/14/2024     CBC:   Lab Results   Component Value Date    WBC 9.08 06/16/2024    HGB 11.0 (L) 06/16/2024    HCT 36.7 (L) 06/16/2024    MCV 85 06/16/2024     (H) 06/16/2024     Lipids:   Lab Results   Component Value Date    CHOL 73 (L) 07/31/2024    TRIG 67 07/31/2024    HDL 29 (L) 07/31/2024     Coagulation:   Lab Results   Component Value Date    INR 1.0 06/15/2024    APTT 43.6 (H) 06/16/2024           Assessment        1. Coronary artery disease of native artery of native heart with stable angina pectoris    2. Ischemic cardiomyopathy    3. S/P drug eluting coronary stent placement    4. Chest pain, unspecified type    5. History of ST elevation myocardial infarction (STEMI)           Plan:    CAD  History of anterior STEMI 6/1/24  S/p PCI ostial LAD, significant distal LAD disease  Continue aspirin, statin, Imdur   Reports blood in his stool   Switch Effient to Plavix   Patient to alert clinic if symptoms do not improve in 1 week    ICM  Echo EF recovered as of 7/24 EF 57 % from 40-45%  Continue Lopressor 25 b.i.d.    Low salt, low fat diet  Exercise as tolerated, at least 30 min daily     This note was prepared using voice recognition system and is likely to have sound alike errors that may have been overlooked even after proofreading.     I have reviewed all pertinent chart information.  Plans and recommendations have been formulated under my direct supervision. All questions answered and patient voiced understanding.   If symptoms persist go to the ED.    RTC in 6  sindhu Daniels MD  Cardiology

## 2024-11-15 ENCOUNTER — TELEPHONE (OUTPATIENT)
Dept: CARDIOLOGY | Facility: CLINIC | Age: 29
End: 2024-11-15
Payer: MEDICAID

## 2024-11-15 NOTE — TELEPHONE ENCOUNTER
Spoke with patient confirmed medications.    ----- Message from Jerilyn sent at 11/15/2024  2:56 PM CST -----  Name of Caller:.Wilfred Beach   Best Call Back Number:.[phone]    Additional Information: Patient is requesting a call back regarding what medication Dr. Daniels replaced and the name of the new medication.

## 2024-11-29 ENCOUNTER — HOSPITAL ENCOUNTER (EMERGENCY)
Facility: HOSPITAL | Age: 29
Discharge: HOME | End: 2024-11-29
Attending: EMERGENCY MEDICINE
Payer: MEDICAID

## 2024-11-29 VITALS
TEMPERATURE: 99 F | RESPIRATION RATE: 18 BRPM | HEIGHT: 66 IN | SYSTOLIC BLOOD PRESSURE: 111 MMHG | HEART RATE: 72 BPM | OXYGEN SATURATION: 100 % | WEIGHT: 185 LBS | DIASTOLIC BLOOD PRESSURE: 70 MMHG | BODY MASS INDEX: 29.73 KG/M2

## 2024-11-29 DIAGNOSIS — G51.0 BELL'S PALSY: Primary | ICD-10-CM

## 2024-11-29 LAB
OHS QRS DURATION: 82 MS
OHS QTC CALCULATION: 332 MS

## 2024-11-29 PROCEDURE — 99284 EMERGENCY DEPT VISIT MOD MDM: CPT

## 2024-11-29 PROCEDURE — 25000003 PHARM REV CODE 250: Performed by: EMERGENCY MEDICINE

## 2024-11-29 PROCEDURE — 63600175 PHARM REV CODE 636 W HCPCS: Performed by: EMERGENCY MEDICINE

## 2024-11-29 RX ORDER — ACYCLOVIR 400 MG/1
400 TABLET ORAL
Qty: 35 TABLET | Refills: 0 | Status: SHIPPED | OUTPATIENT
Start: 2024-11-29 | End: 2024-12-06

## 2024-11-29 RX ORDER — PRASUGREL 10 MG/1
10 TABLET, FILM COATED ORAL DAILY
COMMUNITY

## 2024-11-29 RX ORDER — PREDNISONE 20 MG/1
80 TABLET ORAL
Status: COMPLETED | OUTPATIENT
Start: 2024-11-29 | End: 2024-11-29

## 2024-11-29 RX ORDER — ACYCLOVIR 400 MG/1
400 TABLET ORAL
Status: COMPLETED | OUTPATIENT
Start: 2024-11-29 | End: 2024-11-29

## 2024-11-29 RX ORDER — PREDNISONE 20 MG/1
80 TABLET ORAL DAILY
Qty: 36 TABLET | Refills: 0 | Status: SHIPPED | OUTPATIENT
Start: 2024-11-30 | End: 2024-12-09

## 2024-11-29 RX ADMIN — PREDNISONE 80 MG: 20 TABLET ORAL at 02:11

## 2024-11-29 RX ADMIN — ACYCLOVIR 400 MG: 400 TABLET ORAL at 02:11

## 2024-11-29 NOTE — ED PROVIDER NOTES
SCRIBE #1 NOTE: I, Jose Munguia, am scribing for, and in the presence of, Ruben Frost DO. I have scribed the entire note.       History     Chief Complaint   Patient presents with    Numbness     Patient presents to ED with c/o right sided facial numbness that started yesterday evening around 1530.      Review of patient's allergies indicates:  No Known Allergies      History of Present Illness     HPI    11/29/2024, 2:06 PM  History obtained from the patient      History of Present Illness: Wilfred Beach is a 29 y.o. male patient with a PMHx of stent for Ostial LAD 99% who presents to the Emergency Department for evaluation of right sided facial numbness which onset yesterday around 1530. Symptoms are constant and moderate in severity. No mitigating or exacerbating factors reported. Associated sxs include blurred vision. Pt denies hx of chickpox and cold sores. No prior Tx reported. No further complaints or concerns at this time.       Arrival mode: Personal vehicle    PCP: Johana, Primary Doctor        Past Medical History:  Past Medical History:   Diagnosis Date    Stent 6/1/2024 Ostial LAD 99%        Past Surgical History:  Past Surgical History:   Procedure Laterality Date    LEFT HEART CATHETERIZATION Left 6/1/2024    Procedure: Left heart cath;  Surgeon: Óscar Mcnamara MD;  Location: [facility] C[facility] LAB;  Service: Cardiology;  Laterality: Left;    LEFT HEART CATHETERIZATION Left 6/2/2024    Procedure: Left heart cath;  Surgeon: Óscar Mcnamara MD;  Location: [facility];  Service: Cardiology;  Laterality: Left;    ORIF MANDIBULAR FRACTURE Bilateral 5/31/2021    Procedure: ORIF, FRACTURE, MANDIBLE;  Surgeon: Michael Newberry MD;  Location: [facility] OR;  Service: Plastics;  Laterality: Bilateral;  Nasal intubation    PERCUTANEOUS TRANSLUMINAL BALLOON ANGIOPLASTY OF CORONARY ARTERY  6/1/2024    Procedure: Angioplasty-coronary;  Surgeon: Óscar Mcnamara MD;  Location: [facility] CATH LAB;   "Service: Cardiology;;    STENT, DRUG ELUTING, SINGLE VESSEL, CORONARY  6/1/2024    Procedure: Stent, Drug Eluting, Single Vessel, Coronary;  Surgeon: Óscar Mcnamara MD;  Location: [facility] CATH LAB;  Service: Cardiology;;    THROMBECTOMY, CORONARY  6/1/2024    Procedure: Thrombectomy, Coronary;  Surgeon: Óscar Mcnamara MD;  Location: [facility] CATH LAB;  Service: Cardiology;;         Family History:  Family History   Problem Relation Name Age of Onset    Coronary artery disease Mother         Social History:  Social History     Tobacco Use    Smoking status: Some Days     Types: Cigarettes    Smokeless tobacco: Never   Substance and Sexual Activity    Alcohol use: Never    Drug use: Never    Sexual activity: Not on file        Review of Systems     Review of Systems   Eyes:  Positive for visual disturbance (blurred).   Neurological:  Positive for numbness (right sided facial).      Physical Exam     Initial Vitals [11/29/24 1313]   BP Pulse Resp Temp SpO2   130/74 82 16 97.9 °F (36.6 °C) 98 %      MAP       --          Physical Exam  Vitals reviewed.   Constitutional:       Appearance: Normal appearance.   Skin:     General: Skin is warm and dry.      Findings: No rash.   Neurological:      Mental Status: He is alert.      Comments: Left-sided facial droop, left-sided forehead muscles are not spared, muscle strength 5/5 to all 4 extremities with no sensation changes.  No pronator drift, good finger-to-nose and heel-to-shin exam            ED Course   Procedures  ED Vital Signs:  Vitals:    11/29/24 1313 11/29/24 1433   BP: 130/74 111/70   Pulse: 82 72   Resp: 16 18   Temp: 97.9 °F (36.6 °C) 98.7 °F (37.1 °C)   TempSrc: Oral Oral   SpO2: 98% 100%   Weight: 83.9 kg (185 lb)    Height: 5' 6" (1.676 m)        Abnormal Lab Results:  Labs Reviewed - No data to display     All Lab Results:  none    Imaging Results:  Imaging Results    None          The EKG was ordered, reviewed, and independently interpreted by the ED " provider.  Interpretation time: 01:32  Rate: 81 BPM  Rhythm: normal sinus rhythm with sinus arrhythmia  Interpretation: Nonspecific ST and T wave abnormality. No STEMI.           The Emergency Provider reviewed the vital signs and test results, which are outlined above.     ED Discussion       2:11 PM: Reassessed pt at this time. Discussed with pt all pertinent ED information and results. Discussed pt dx and plan of tx. Gave pt all f/u and return to the ED instructions. All questions and concerns were addressed at this time. Pt expresses understanding of information and instructions, and is comfortable with plan to discharge. Pt is stable for discharge.    I discussed with patient and/or family/caretaker that evaluation in the ED does not suggest any emergent or life threatening medical conditions requiring immediate intervention beyond what was provided in the ED, and I believe patient is safe for discharge.  Regardless, an unremarkable evaluation in the ED does not preclude the development or presence of a serious of life threatening condition. As such, patient was instructed to return immediately for any worsening or change in current symptoms.         Medical Decision Making  I did have a discussion with this patient concerning that his Bell's palsy might not completely resolved.  He verbalized understanding.  I have placed him on steroid and antiviral therapy.  Instructed to return immediately for any new or worsening symptoms and he verbalized understanding.    Risk  Prescription drug management.  Risk Details: Differential diagnosis includes but is not limited to:  Bell's palsy, CVA                ED Medication(s):  Medications   predniSONE tablet 80 mg (80 mg Oral Given 11/29/24 1425)   acyclovir tablet 400 mg (400 mg Oral Given 11/29/24 1415)       Discharge Medication List as of 11/29/2024  2:11 PM        START taking these medications    Details   acyclovir (ZOVIRAX) 400 MG tablet Take 1 tablet (400 mg  total) by mouth 5 (five) times daily. for 7 days, Starting Fri 11/29/2024, Until Fri 12/6/2024, Print      predniSONE (DELTASONE) 20 MG tablet Take 4 tablets (80 mg total) by mouth once daily. for 9 days, Starting Sat 11/30/2024, Until Mon 12/9/2024, Print              Follow-up Information       Schedule an appointment as soon as possible for a visit  with [facility].    Contact information:  [address]  [place] [address] [phone]                                 Scribe Attestation:   Scribe #1: I performed the above scribed service and the documentation accurately describes the services I performed. I attest to the accuracy of the note.     Attending:   Physician Attestation Statement for Scribe #1: I, Ruben Frost DO, personally performed the services described in this documentation, as scribed by Jose Munguia, in my presence, and it is both accurate and complete.           Clinical Impression       ICD-10-CM ICD-9-CM   1. Bell's palsy  G51.0 351.0       Disposition:   Disposition: Discharged  Condition: Stable         Ruben Frost DO  11/30/24 2029

## 2024-11-29 NOTE — FIRST PROVIDER EVALUATION
" Emergency Department TeleTriage Encounter Note      CHIEF COMPLAINT    Chief Complaint   Patient presents with    Numbness     Patient presents to ED with c/o right sided facial numbness that started yesterday evening around 1530.        VITAL SIGNS   Initial Vitals [11/29/24 1313]   BP Pulse Resp Temp SpO2   130/74 82 16 97.9 °F (36.6 °C) 98 %      MAP       --            ALLERGIES    Review of patient's allergies indicates:  No Known Allergies    PROVIDER TRIAGE NOTE  This is a teletriage evaluation of a 29 y.o. male presenting to the ED complaining of numbness. Patient reports right sided facial numbness since 7pm last night. He also states that when he woke up this morning the right side of his face seemed to have "shut down." He denies headache, weakness other places, or other paresthesias.    Patient is alert and oriented. He speaks in complete sentences. He is sitting upright in the chair in no distress. Patient has some decreased movement on the right side of his face but is able to close his right eye completely.    The on-site providers were messaged about this patient.    Initial orders will be placed and care will be transferred to an alternate provider when patient is roomed for a full evaluation. Any additional orders and the final disposition will be determined by that provider.         ORDERS  Labs Reviewed - No data to display    ED Orders (720h ago, onward)      None              Virtual Visit Note: The provider triage portion of this emergency department evaluation and documentation was performed via [employer], a HIPAA-compliant telemedicine application, in concert with a tele-presenter in the room. A face to face patient evaluation with one of my colleagues will occur once the patient is placed in an emergency department room.      DISCLAIMER: This note was prepared with [employer] voice recognition transcription software. Garbled syntax, mangled pronouns, and other bizarre constructions may be " attributed to that software system.

## 2025-03-31 DIAGNOSIS — Z95.5 S/P DRUG ELUTING CORONARY STENT PLACEMENT: ICD-10-CM

## 2025-03-31 DIAGNOSIS — I25.118 CORONARY ARTERY DISEASE OF NATIVE ARTERY OF NATIVE HEART WITH STABLE ANGINA PECTORIS: ICD-10-CM

## 2025-03-31 DIAGNOSIS — I25.5 ISCHEMIC CARDIOMYOPATHY: ICD-10-CM

## 2025-03-31 DIAGNOSIS — I21.02 ST ELEVATION MYOCARDIAL INFARCTION INVOLVING LEFT ANTERIOR DESCENDING (LAD) CORONARY ARTERY: ICD-10-CM

## 2025-03-31 RX ORDER — ASPIRIN 81 MG/1
81 TABLET ORAL DAILY
Qty: 90 TABLET | Refills: 3 | Status: SHIPPED | OUTPATIENT
Start: 2025-03-31 | End: 2026-03-31

## 2025-05-19 ENCOUNTER — OFFICE VISIT (OUTPATIENT)
Dept: CARDIOLOGY | Facility: CLINIC | Age: 30
End: 2025-05-19
Payer: MEDICAID

## 2025-05-19 VITALS
HEART RATE: 75 BPM | BODY MASS INDEX: 31.36 KG/M2 | SYSTOLIC BLOOD PRESSURE: 124 MMHG | HEIGHT: 66 IN | WEIGHT: 195.13 LBS | DIASTOLIC BLOOD PRESSURE: 66 MMHG

## 2025-05-19 DIAGNOSIS — I25.118 CORONARY ARTERY DISEASE OF NATIVE ARTERY OF NATIVE HEART WITH STABLE ANGINA PECTORIS: Primary | ICD-10-CM

## 2025-05-19 DIAGNOSIS — I21.02 ST ELEVATION MYOCARDIAL INFARCTION INVOLVING LEFT ANTERIOR DESCENDING (LAD) CORONARY ARTERY: ICD-10-CM

## 2025-05-19 DIAGNOSIS — Z95.5 S/P DRUG ELUTING CORONARY STENT PLACEMENT: ICD-10-CM

## 2025-05-19 DIAGNOSIS — I25.5 ISCHEMIC CARDIOMYOPATHY: ICD-10-CM

## 2025-05-19 PROCEDURE — 99214 OFFICE O/P EST MOD 30 MIN: CPT | Mod: S$PBB,,,

## 2025-05-19 PROCEDURE — 1160F RVW MEDS BY RX/DR IN RCRD: CPT | Mod: CPTII,,,

## 2025-05-19 PROCEDURE — 3074F SYST BP LT 130 MM HG: CPT | Mod: CPTII,,,

## 2025-05-19 PROCEDURE — 3078F DIAST BP <80 MM HG: CPT | Mod: CPTII,,,

## 2025-05-19 PROCEDURE — 1159F MED LIST DOCD IN RCRD: CPT | Mod: CPTII,,,

## 2025-05-19 PROCEDURE — 3008F BODY MASS INDEX DOCD: CPT | Mod: CPTII,,,

## 2025-05-19 PROCEDURE — 99999 PR PBB SHADOW E&M-EST. PATIENT-LVL III: CPT | Mod: PBBFAC,,,

## 2025-05-19 PROCEDURE — 99213 OFFICE O/P EST LOW 20 MIN: CPT | Mod: PBBFAC

## 2025-05-19 RX ORDER — ATORVASTATIN CALCIUM 80 MG/1
40 TABLET, FILM COATED ORAL NIGHTLY
Qty: 45 TABLET | Refills: 3 | Status: SHIPPED | OUTPATIENT
Start: 2025-05-19 | End: 2026-05-19

## 2025-05-19 RX ORDER — METOPROLOL SUCCINATE 25 MG/1
25 TABLET, EXTENDED RELEASE ORAL DAILY
Qty: 90 TABLET | Refills: 3 | Status: SHIPPED | OUTPATIENT
Start: 2025-05-19 | End: 2026-05-19

## 2025-05-19 NOTE — PROGRESS NOTES
"Subjective:   Patient ID:  Wilfred Beach is a 29 y.o. male who presents for evaluation of No chief complaint on file.      HPI29y/o M whose current medical conditions include CAD, cardiomyopathy, followed by Dr. Daniels in cardiology clinic here today for CV follow up denies any CP, angina or anginal equivalent at this time. Has been taking asa, plavix and effient. Denies any ongoing bleeding issues. Compliant with diet and BP stable in clinic today.       Echo 7/31/24 EF nml  [facility] 24' PCI in LAD (99% ost LAD stenosis)  No results found for: "HGBA1C"    Past Medical History:   Diagnosis Date    Stent 6/1/2024 Ostial LAD 99%        Past Surgical History:   Procedure Laterality Date    LEFT HEART CATHETERIZATION Left 6/1/2024    Procedure: Left heart cath;  Surgeon: Óscar Mcnamara MD;  Location: [facility] CATH LAB;  Service: Cardiology;  Laterality: Left;    LEFT HEART CATHETERIZATION Left 6/2/2024    Procedure: Left heart cath;  Surgeon: Óscar Mcnamara MD;  Location: [facility] CATH LAB;  Service: Cardiology;  Laterality: Left;    ORIF MANDIBULAR FRACTURE Bilateral 5/31/2021    Procedure: ORIF, FRACTURE, MANDIBLE;  Surgeon: Michael Newberry MD;  Location: [facility] OR;  Service: Plastics;  Laterality: Bilateral;  Nasal intubation    PERCUTANEOUS TRANSLUMINAL BALLOON ANGIOPLASTY OF CORONARY ARTERY  6/1/2024    Procedure: Angioplasty-coronary;  Surgeon: Óscar Mcnamara MD;  Location: [facility] CATH LAB;  Service: Cardiology;;    STENT, DRUG ELUTING, SINGLE VESSEL, CORONARY  6/1/2024    Procedure: Stent, Drug Eluting, Single Vessel, Coronary;  Surgeon: Óscar Mcnamara MD;  Location: [facility] CATH LAB;  Service: Cardiology;;    THROMBECTOMY, CORONARY  6/1/2024    Procedure: Thrombectomy, Coronary;  Surgeon: Óscar Mcnamara MD;  Location: [facility] CATH LAB;  Service: Cardiology;;       Social History[1]    Family History   Problem Relation Name Age of Onset    Coronary artery disease Mother         Medications " Ordered Prior to Encounter[2]   Wt Readings from Last 3 Encounters:   05/19/25 88.5 kg (195 lb 1.7 oz)   11/29/24 83.9 kg (185 lb)   11/14/24 83.6 kg (184 lb 4.9 oz)     Temp Readings from Last 3 Encounters:   11/29/24 98.7 °F (37.1 °C) (Oral)   06/16/24 97.9 °F (36.6 °C)   06/06/24 98.2 °F (36.8 °C) (Oral)     BP Readings from Last 3 Encounters:   05/19/25 124/66   11/29/24 111/70   11/14/24 102/67     Pulse Readings from Last 3 Encounters:   05/19/25 75   11/29/24 72   11/14/24 70        Review of Systems   Constitutional: Negative.   HENT: Negative.     Eyes: Negative.    Cardiovascular: Negative.    Respiratory: Negative.     Skin: Negative.    Musculoskeletal: Negative.    Gastrointestinal: Negative.    Genitourinary: Negative.    Neurological: Negative.    Psychiatric/Behavioral: Negative.         Objective:   Physical Exam  Vitals and nursing note reviewed.   Constitutional:       Appearance: Normal appearance.   HENT:      Head: Normocephalic and atraumatic.   Eyes:      General:         Right eye: No discharge.         Left eye: No discharge.      Pupils: Pupils are equal, round, and reactive to light.   Cardiovascular:      Rate and Rhythm: Normal rate and regular rhythm.      Heart sounds: S1 normal and S2 normal. No murmur heard.     No friction rub.   Pulmonary:      Effort: Pulmonary effort is normal. No respiratory distress.      Breath sounds: Normal breath sounds. No rales.   Abdominal:      Palpations: Abdomen is soft.      Tenderness: There is no abdominal tenderness.   Musculoskeletal:      Cervical back: Neck supple.      Right lower leg: No edema.      Left lower leg: No edema.   Skin:     General: Skin is warm and dry.   Neurological:      General: No focal deficit present.      Mental Status: He is alert and oriented to person, place, and time.   Psychiatric:         Mood and Affect: Mood normal.         Behavior: Behavior normal.         Thought Content: Thought content normal.         Lab  "Results   Component Value Date    CHOL 73 (L) 07/31/2024    CHOL 140 06/02/2024     Lab Results   Component Value Date    HDL 29 (L) 07/31/2024    HDL 32 (L) 06/02/2024     Lab Results   Component Value Date    LDLCALC 30.6 (L) 07/31/2024    LDLCALC 93.2 06/02/2024     Lab Results   Component Value Date    TRIG 67 07/31/2024    TRIG 74 06/02/2024     Lab Results   Component Value Date    CHOLHDL 39.7 07/31/2024    CHOLHDL 22.9 06/02/2024       Chemistry        Component Value Date/Time     07/31/2024 0754    K 4.0 07/31/2024 0754     07/31/2024 0754    CO2 29 07/31/2024 0754    BUN 11 07/31/2024 0754    CREATININE 1.0 07/31/2024 0754    GLU 76 07/31/2024 0754        Component Value Date/Time    CALCIUM 9.6 07/31/2024 0754    ALKPHOS 71 07/31/2024 0754    AST 24 07/31/2024 0754    ALT 37 07/31/2024 0754    BILITOT 1.2 (H) 07/31/2024 0754    ESTGFRAFRICA >60 07/28/2019 2024    EGFRNONAA >60 07/28/2019 2024          No results found for: "TSH"  Lab Results   Component Value Date    INR 1.0 06/15/2024    INR 1.0 06/14/2024     @RESUFAST(WBC,HGB,HCT,MCV,PLT)  @LABRCNTIP(BNP,BNPTRIAGEBLO)@  CrCl cannot be calculated (Patient's most recent lab result is older than the maximum 7 days allowed.).     Results for orders placed during the hospital encounter of 07/31/24    Echo    Interpretation Summary    Left Ventricle: The left ventricle is normal in size. Normal wall thickness. There is normal systolic function. Biplane (2D) method of discs ejection fraction is 57%. There is normal diastolic function.    Right Ventricle: Normal right ventricular cavity size. Wall thickness is normal. Systolic function is normal.    Left Atrium: Left atrium is dilated.    Pulmonary Artery: The estimated pulmonary artery systolic pressure is 29 mmHg.    IVC/SVC: Normal venous pressure at 3 mmHg.     No results found for this or any previous visit.     Assessment:      1. Coronary artery disease of native artery of native heart with " stable angina pectoris    2. Ischemic cardiomyopathy    3. S/P drug eluting coronary stent placement        Plan:   Coronary artery disease of native artery of native heart with stable angina pectoris  -     metoprolol succinate (TOPROL-XL) 25 MG 24 hr tablet; Take 1 tablet (25 mg total) by mouth once daily.  Dispense: 90 tablet; Refill: 3    Ischemic cardiomyopathy    S/P drug eluting coronary stent placement      DC effient  Switch to Toprol XL for better compliance    Asa, statin, plavix, metoprolol- CAD  Statin, low fat diet- Hlp  RF modifications  Daily exercise as tolerated    Fasting lipids  Repeat Echo in July  RTC 6m      Maricruz Posada, FNP-C [facility], Cardiology         [1]   Social History  Tobacco Use    Smoking status: Some Days     Types: Cigarettes    Smokeless tobacco: Never   Substance Use Topics    Alcohol use: Never    Drug use: Never   [2]   Current Outpatient Medications on File Prior to Visit   Medication Sig Dispense Refill    aspirin (ECOTRIN) 81 MG EC tablet Take 1 tablet (81 mg total) by mouth once daily. 90 tablet 3    atorvastatin (LIPITOR) 80 MG tablet Take 1 tablet (80 mg total) by mouth every evening. 90 tablet 3    clopidogreL (PLAVIX) 75 mg tablet Take 1 tablet (75 mg total) by mouth once daily. 90 tablet 3    isosorbide mononitrate (IMDUR) 30 MG 24 hr tablet Take 1 tablet (30 mg total) by mouth once daily. 90 tablet 3    nitroGLYCERIN (NITROSTAT) 0.4 MG SL tablet Place 1 tablet (0.4 mg total) under the tongue every 5 (five) minutes as needed for Chest pain. 100 tablet 3    [DISCONTINUED] metoprolol tartrate (LOPRESSOR) 25 MG tablet Take 1 tablet (25 mg total) by mouth 2 (two) times daily. 180 tablet 3    [DISCONTINUED] prasugreL (EFFIENT) 10 mg Tab Take 10 mg by mouth once daily.      acyclovir (ZOVIRAX) 400 MG tablet Take 1 tablet (400 mg total) by mouth 5 (five) times daily. for 7 days 35 tablet 0     No current facility-administered medications on file prior to visit.

## 2025-07-11 ENCOUNTER — TELEPHONE (OUTPATIENT)
Dept: CARDIOLOGY | Facility: CLINIC | Age: 30
End: 2025-07-11
Payer: MEDICAID

## 2025-07-11 DIAGNOSIS — Z95.5 S/P DRUG ELUTING CORONARY STENT PLACEMENT: ICD-10-CM

## 2025-07-11 DIAGNOSIS — I25.5 ISCHEMIC CARDIOMYOPATHY: ICD-10-CM

## 2025-07-11 DIAGNOSIS — I25.118 CORONARY ARTERY DISEASE OF NATIVE ARTERY OF NATIVE HEART WITH STABLE ANGINA PECTORIS: ICD-10-CM

## 2025-07-11 DIAGNOSIS — I21.02 ST ELEVATION MYOCARDIAL INFARCTION INVOLVING LEFT ANTERIOR DESCENDING (LAD) CORONARY ARTERY: ICD-10-CM

## 2025-07-11 RX ORDER — ISOSORBIDE MONONITRATE 30 MG/1
30 TABLET, EXTENDED RELEASE ORAL DAILY
Qty: 90 TABLET | Refills: 3 | Status: SHIPPED | OUTPATIENT
Start: 2025-07-11 | End: 2026-07-11

## 2025-07-11 NOTE — TELEPHONE ENCOUNTER
Copied from CRM #[id number]. Topic: Medications - Medication Status Check   >> Jul 11, 2025  3:52 PM Juanita wrote:  ..Type:  Patient Requesting Call    Who Called: Wilfred Beach  What is the call regarding?: pt is checking on the status of isosorbide mononitrate (IMDUR) 30 MG 24 hr tablet  Would the patient rather a call back or a response via [facility]?  call  Best Call Back Number: [phone] (home)  Additional Information:      Pt notified rx has been approved and sent to pharmacy

## 2025-07-22 DIAGNOSIS — I25.5 ISCHEMIC CARDIOMYOPATHY: ICD-10-CM

## 2025-07-22 DIAGNOSIS — I25.118 CORONARY ARTERY DISEASE OF NATIVE ARTERY OF NATIVE HEART WITH STABLE ANGINA PECTORIS: ICD-10-CM

## 2025-07-22 DIAGNOSIS — Z95.5 S/P DRUG ELUTING CORONARY STENT PLACEMENT: ICD-10-CM

## 2025-07-22 DIAGNOSIS — I21.02 ST ELEVATION MYOCARDIAL INFARCTION INVOLVING LEFT ANTERIOR DESCENDING (LAD) CORONARY ARTERY: ICD-10-CM

## 2025-07-22 RX ORDER — ISOSORBIDE MONONITRATE 30 MG/1
30 TABLET, EXTENDED RELEASE ORAL DAILY
Qty: 90 TABLET | Refills: 3 | Status: SHIPPED | OUTPATIENT
Start: 2025-07-22 | End: 2026-07-22

## 2025-08-01 DIAGNOSIS — I21.02 ST ELEVATION MYOCARDIAL INFARCTION INVOLVING LEFT ANTERIOR DESCENDING (LAD) CORONARY ARTERY: ICD-10-CM

## 2025-08-01 DIAGNOSIS — I25.5 ISCHEMIC CARDIOMYOPATHY: ICD-10-CM

## 2025-08-01 DIAGNOSIS — I25.118 CORONARY ARTERY DISEASE OF NATIVE ARTERY OF NATIVE HEART WITH STABLE ANGINA PECTORIS: ICD-10-CM

## 2025-08-01 DIAGNOSIS — Z95.5 S/P DRUG ELUTING CORONARY STENT PLACEMENT: ICD-10-CM

## 2025-08-01 RX ORDER — ATORVASTATIN CALCIUM 80 MG/1
40 TABLET, FILM COATED ORAL NIGHTLY
Qty: 45 TABLET | Refills: 3 | Status: SHIPPED | OUTPATIENT
Start: 2025-08-01 | End: 2026-08-01

## 2025-08-01 RX ORDER — ASPIRIN 81 MG/1
81 TABLET ORAL DAILY
Qty: 90 TABLET | Refills: 3 | Status: SHIPPED | OUTPATIENT
Start: 2025-08-01 | End: 2026-08-01

## 2025-08-01 RX ORDER — METOPROLOL SUCCINATE 25 MG/1
25 TABLET, EXTENDED RELEASE ORAL DAILY
Qty: 90 TABLET | Refills: 3 | Status: SHIPPED | OUTPATIENT
Start: 2025-08-01 | End: 2026-08-01

## 2025-08-01 NOTE — TELEPHONE ENCOUNTER
Patient came into clinic requesting a refill request. I explained to patient the pharmacy may have reached out however, providers do have a 2-3 days to responds to request.

## 2025-08-01 NOTE — TELEPHONE ENCOUNTER
Copied from CRM #[id number]. Topic: Medications - Medication Refill  >> Aug 1, 2025  3:34 PM Ariana wrote:  Type:  RX Refill Request    Who Called: Wilfred Beach  Refill or New Rx:refill  RX Name and Strength:aspirin (ECOTRIN) 81 MG EC tablet  How is the patient currently taking it? (ex. 1XDay):1 x day  Is this a 30 day or 90 day RX:90  Preferred Pharmacy with phone number:  [id number] [place] [facility] [place] - [address] [facility]  [address]  [place] [facility] [address]  Phone: [phone] Fax: [phone]      Local or Mail Order:local  Ordering Provider:tianna  Would the patient rather a call back or a response via [facility]? Call back  Best Call Back Number:[phone]  Additional Information: need a call back, has no more medication left

## 2025-08-04 ENCOUNTER — TELEPHONE (OUTPATIENT)
Dept: CARDIOLOGY | Facility: CLINIC | Age: 30
End: 2025-08-04
Payer: MEDICAID

## 2025-08-04 ENCOUNTER — HOSPITAL ENCOUNTER (OUTPATIENT)
Dept: CARDIOLOGY | Facility: HOSPITAL | Age: 30
Discharge: HOME | End: 2025-08-04
Payer: MEDICAID

## 2025-08-04 VITALS
DIASTOLIC BLOOD PRESSURE: 66 MMHG | SYSTOLIC BLOOD PRESSURE: 124 MMHG | HEIGHT: 66 IN | WEIGHT: 195 LBS | BODY MASS INDEX: 31.34 KG/M2

## 2025-08-04 DIAGNOSIS — I25.5 ISCHEMIC CARDIOMYOPATHY: ICD-10-CM

## 2025-08-04 DIAGNOSIS — I25.118 CORONARY ARTERY DISEASE OF NATIVE ARTERY OF NATIVE HEART WITH STABLE ANGINA PECTORIS: ICD-10-CM

## 2025-08-04 PROCEDURE — 93306 TTE W/DOPPLER COMPLETE: CPT

## 2025-08-04 PROCEDURE — 93306 TTE W/DOPPLER COMPLETE: CPT | Mod: 26,,, | Performed by: INTERNAL MEDICINE

## 2025-08-04 NOTE — TELEPHONE ENCOUNTER
Called and reviewed labs with patient. Patient verbalized understanding. No questions or concerns. MIRNA

## 2025-08-07 LAB
AORTIC ROOT ANNULUS: 2.4 CM
AORTIC SIZE INDEX (SOV): 1.1 CM/M2
AORTIC SIZE INDEX: 1.3 CM/M2
ASCENDING AORTA: 2.6 CM
AV INDEX (PROSTH): 0.59
AV MEAN GRADIENT: 4 MMHG
AV PEAK GRADIENT: 7 MMHG
AV VALVE AREA BY VELOCITY RATIO: 1.7 CM²
AV VALVE AREA: 1.7 CM²
AV VELOCITY RATIO: 0.62
BSA FOR ECHO PROCEDURE: 2.03 M2
CV ECHO LV RWT: 0.38 CM
DOP CALC AO PEAK VEL: 1.3 M/S
DOP CALC AO VTI: 26.2 CM
DOP CALC LVOT AREA: 2.8 CM2
DOP CALC LVOT DIAMETER: 1.9 CM
DOP CALC LVOT PEAK VEL: 0.8 M/S
DOP CALC RVOT PEAK VEL: 0.64 M/S
DOP CALC RVOT VTI: 15.8 CM
DOP CALCLVOT PEAK VEL VTI: 15.4 CM
E WAVE DECELERATION TIME: 149 MSEC
E/A RATIO: 0.98
E/E' RATIO: 6 M/S
ECHO LV POSTERIOR WALL: 0.9 CM (ref 0.6–1.1)
FRACTIONAL SHORTENING: 35.4 % (ref 28–44)
INTERVENTRICULAR SEPTUM: 0.9 CM (ref 0.6–1.1)
IVC DIAMETER: 1.46 CM
IVRT: 83 MSEC
LA MAJOR: 5.4 CM
LA MINOR: 5.4 CM
LA WIDTH: 3 CM
LEFT ATRIUM AREA SYSTOLIC (APICAL 2 CHAMBER): 16.07 CM2
LEFT ATRIUM AREA SYSTOLIC (APICAL 4 CHAMBER): 15.14 CM2
LEFT ATRIUM SIZE: 3.2 CM
LEFT ATRIUM VOLUME INDEX MOD: 21 ML/M2
LEFT ATRIUM VOLUME INDEX: 22 ML/M2
LEFT ATRIUM VOLUME MOD: 41 ML
LEFT ATRIUM VOLUME: 44 CM3
LEFT INTERNAL DIMENSION IN SYSTOLE: 3.1 CM (ref 2.1–4)
LEFT VENTRICLE DIASTOLIC VOLUME INDEX: 54.55 ML/M2
LEFT VENTRICLE DIASTOLIC VOLUME: 108 ML
LEFT VENTRICLE END SYSTOLIC VOLUME APICAL 2 CHAMBER: 44.51 ML
LEFT VENTRICLE END SYSTOLIC VOLUME APICAL 4 CHAMBER: 35.84 ML
LEFT VENTRICLE MASS INDEX: 74.6 G/M2
LEFT VENTRICLE SYSTOLIC VOLUME INDEX: 18.7 ML/M2
LEFT VENTRICLE SYSTOLIC VOLUME: 37 ML
LEFT VENTRICULAR INTERNAL DIMENSION IN DIASTOLE: 4.8 CM (ref 3.5–6)
LEFT VENTRICULAR MASS: 147.8 G
LV LATERAL E/E' RATIO: 4.9 M/S
LV SEPTAL E/E' RATIO: 7.2 M/S
LVED V (TEICH): 108.07 ML
LVES V (TEICH): 37.37 ML
LVOT MG: 1.31 MMHG
LVOT MV: 0.54 CM/S
Lab: 1.3 CM/M
Lab: 1.6 CM/M
MV PEAK A VEL: 0.81 M/S
MV PEAK E VEL: 0.79 M/S
OHS CV CPX PATIENT HEIGHT IN: 66
OHS CV RV/LV RATIO: 0.63 CM
PISA TR MAX VEL: 2.2 M/S
PV MEAN GRADIENT: 1 MMHG
RA MAJOR: 4.58 CM
RA PRESSURE ESTIMATED: 8 MMHG
RA WIDTH: 3.21 CM
RIGHT VENTRICLE DIASTOLIC BASEL DIMENSION: 3 CM
RV TB RVSP: 10 MMHG
SINUS: 2.2 CM
STJ: 2.4 CM
TDI LATERAL: 0.16 M/S
TDI SEPTAL: 0.11 M/S
TDI: 0.14 M/S
TR MAX PG: 19 MMHG
TRICUSPID ANNULAR PLANE SYSTOLIC EXCURSION: 2.5 CM
TV REST PULMONARY ARTERY PRESSURE: 27 MMHG
Z-SCORE OF LEFT VENTRICULAR DIMENSION IN END DIASTOLE: -1.75
Z-SCORE OF LEFT VENTRICULAR DIMENSION IN END SYSTOLE: -0.99

## 2025-08-11 ENCOUNTER — PATIENT MESSAGE (OUTPATIENT)
Dept: CARDIOLOGY | Facility: HOSPITAL | Age: 30
End: 2025-08-11
Payer: MEDICAID

## 2025-08-11 ENCOUNTER — TELEPHONE (OUTPATIENT)
Dept: CARDIOLOGY | Facility: CLINIC | Age: 30
End: 2025-08-11
Payer: MEDICAID

## 2025-08-11 DIAGNOSIS — I25.5 ISCHEMIC CARDIOMYOPATHY: ICD-10-CM

## 2025-08-11 DIAGNOSIS — R93.1 REGIONAL WALL MOTION ABNORMALITY OF HEART: ICD-10-CM

## 2025-08-11 DIAGNOSIS — I25.2 HISTORY OF ST ELEVATION MYOCARDIAL INFARCTION (STEMI): ICD-10-CM

## 2025-08-11 DIAGNOSIS — R93.1 ABNORMAL ECHOCARDIOGRAM: ICD-10-CM

## 2025-08-11 DIAGNOSIS — I25.118 CORONARY ARTERY DISEASE OF NATIVE ARTERY OF NATIVE HEART WITH STABLE ANGINA PECTORIS: ICD-10-CM

## 2025-08-11 DIAGNOSIS — I25.118 CORONARY ARTERY DISEASE OF NATIVE ARTERY OF NATIVE HEART WITH STABLE ANGINA PECTORIS: Primary | ICD-10-CM

## 2025-08-11 RX ORDER — METOPROLOL SUCCINATE 25 MG/1
25 TABLET, EXTENDED RELEASE ORAL DAILY
Qty: 90 TABLET | Refills: 3 | Status: SHIPPED | OUTPATIENT
Start: 2025-08-11 | End: 2026-08-11

## 2025-08-20 ENCOUNTER — HOSPITAL ENCOUNTER (OUTPATIENT)
Dept: CARDIOLOGY | Facility: HOSPITAL | Age: 30
Discharge: HOME | End: 2025-08-20
Payer: MEDICAID

## 2025-08-20 ENCOUNTER — HOSPITAL ENCOUNTER (OUTPATIENT)
Dept: RADIOLOGY | Facility: HOSPITAL | Age: 30
Discharge: HOME | End: 2025-08-20
Payer: MEDICAID

## 2025-08-20 DIAGNOSIS — I25.118 CORONARY ARTERY DISEASE OF NATIVE ARTERY OF NATIVE HEART WITH STABLE ANGINA PECTORIS: ICD-10-CM

## 2025-08-20 DIAGNOSIS — R93.1 REGIONAL WALL MOTION ABNORMALITY OF HEART: ICD-10-CM

## 2025-08-20 DIAGNOSIS — R93.1 ABNORMAL ECHOCARDIOGRAM: ICD-10-CM

## 2025-08-20 DIAGNOSIS — I25.5 ISCHEMIC CARDIOMYOPATHY: ICD-10-CM

## 2025-08-20 LAB
CV STRESS BASE HR: 60 BPM
DIASTOLIC BLOOD PRESSURE: 78 MMHG
NUC REST EJECTION FRACTION: 62
NUC STRESS EJECTION FRACTION: 66 %
OHS CV CPX 85 PERCENT MAX PREDICTED HEART RATE MALE: 161
OHS CV CPX ESTIMATED METS: 1
OHS CV CPX MAX PREDICTED HEART RATE: 189
OHS CV CPX PATIENT IS FEMALE: 0
OHS CV CPX PATIENT IS MALE: 1
OHS CV CPX PEAK DIASTOLIC BLOOD PRESSURE: 73 MMHG
OHS CV CPX PEAK HEAR RATE: 116 BPM
OHS CV CPX PEAK RATE PRESSURE PRODUCT: NORMAL
OHS CV CPX PEAK SYSTOLIC BLOOD PRESSURE: 142 MMHG
OHS CV CPX PERCENT MAX PREDICTED HEART RATE ACHIEVED: 61
OHS CV CPX RATE PRESSURE PRODUCT PRESENTING: 7140
OHS CV INITIAL DOSE: 9.8 MCG/KG/MIN
OHS CV PEAK DOSE: 31 MCG/KG/MIN
STRESS ECHO POST EXERCISE DUR SEC: 48 SECONDS
SYSTOLIC BLOOD PRESSURE: 119 MMHG

## 2025-08-20 PROCEDURE — 78452 HT MUSCLE IMAGE SPECT MULT: CPT

## 2025-08-20 PROCEDURE — 93017 CV STRESS TEST TRACING ONLY: CPT

## 2025-08-20 PROCEDURE — 63600175 PHARM REV CODE 636 W HCPCS

## 2025-08-20 PROCEDURE — A9502 TC99M TETROFOSMIN: HCPCS

## 2025-08-20 RX ORDER — REGADENOSON 0.08 MG/ML
0.4 INJECTION, SOLUTION INTRAVENOUS ONCE
Status: COMPLETED | OUTPATIENT
Start: 2025-08-20 | End: 2025-08-20

## 2025-08-20 RX ADMIN — REGADENOSON 0.4 MG: 0.08 INJECTION, SOLUTION INTRAVENOUS at 10:08

## 2025-08-20 RX ADMIN — TETROFOSMIN 31 MILLICURIE: 1.38 INJECTION, POWDER, LYOPHILIZED, FOR SOLUTION INTRAVENOUS at 10:08

## 2025-08-20 RX ADMIN — TETROFOSMIN 9.8 MILLICURIE: 1.38 INJECTION, POWDER, LYOPHILIZED, FOR SOLUTION INTRAVENOUS at 10:08

## 2025-08-22 ENCOUNTER — TELEPHONE (OUTPATIENT)
Dept: CARDIOLOGY | Facility: CLINIC | Age: 30
End: 2025-08-22
Payer: MEDICAID

## (undated) DEVICE — SHEATH INTRODUCER 6FR 11CM

## (undated) DEVICE — KIT INDIGO CATH RX ASP 140CM

## (undated) DEVICE — GLOVE BIOGEL SKINSENSE PI 6.5

## (undated) DEVICE — SEE MEDLINE ITEM 157166

## (undated) DEVICE — SKIN MARKER DEVON 160

## (undated) DEVICE — SPONGE LAP 18X18 PREWASHED

## (undated) DEVICE — POSITIONER HEAD DONUT 9IN FOAM

## (undated) DEVICE — GUIDEWIRE WHOLEY HI TORQ 175CM

## (undated) DEVICE — NDL PERCUTANEOUS 21G 7CM VASC

## (undated) DEVICE — GLOVE BIOGEL SKINSENSE PI 8.0

## (undated) DEVICE — WIRE GUIDE TEFLON 3CM .035 145

## (undated) DEVICE — CATH IV CATHLON W/HUB16GAX

## (undated) DEVICE — KIT SYR REUSABLE

## (undated) DEVICE — NDL N SERIES MICRO-DISSECTION

## (undated) DEVICE — KIT MANIFOLD LOW PRESS TUBING

## (undated) DEVICE — GLOVE BIOGEL SKINSENSE PI 7.5

## (undated) DEVICE — CATH NC EMERGE MR 3.5X15MM

## (undated) DEVICE — GUIDE LAUNCHER 6FR EBU 3.5

## (undated) DEVICE — SYR 10CC LUER LOCK

## (undated) DEVICE — ELECTRODE REM PLYHSV RETURN 9

## (undated) DEVICE — PACK HEART CATH BR

## (undated) DEVICE — CATH IMPULSE PIGTAIL 6F 110CM

## (undated) DEVICE — CLOSURE SKIN STERI STRIP 1/2X4

## (undated) DEVICE — OMNIPAQUE 300MG 150ML VIAL

## (undated) DEVICE — SEE MEDLINE ITEM 156930

## (undated) DEVICE — SEE MEDLINE ITEM 157110

## (undated) DEVICE — POSITIONER HEEL FOAM CONVOLTD

## (undated) DEVICE — GLOVE BIOGEL SKINSENSE PI 7.0

## (undated) DEVICE — NDL HYPO REG 25G X 1 1/2

## (undated) DEVICE — CATH EXPORT ASPIRATION 6FR

## (undated) DEVICE — SEE MEDLINE ITEM 152622

## (undated) DEVICE — DRAPE HEAD/BAR 40 X 27 W/ADH

## (undated) DEVICE — SYR ONLY LUER LOCK 20CC

## (undated) DEVICE — STAPLER SKIN ROTATING HEAD

## (undated) DEVICE — WIRE PTCE CHOICE PT .014X182

## (undated) DEVICE — TRAY DRY SKIN SCRUB PREP

## (undated) DEVICE — SUT PERCLOSE PROSTYLE MEDIATE

## (undated) DEVICE — CATH EMERGE MR 20 X 3.00

## (undated) DEVICE — PAD DEFIB CADENCE ADULT R2

## (undated) DEVICE — UNDERGLOVES BIOGEL PI SZ 7 LF

## (undated) DEVICE — CATH DIAG IMPULSE 6FR FL4

## (undated) DEVICE — CATH DIAG IMPULSE 6FR FR4

## (undated) DEVICE — POSITIONER IV ARMBOARD FOAM

## (undated) DEVICE — ANGIOTOUCH KIT

## (undated) DEVICE — SEE MEDLINE ITEM 153151

## (undated) DEVICE — UNDERGLOVE BIOGEL PI SZ 6.5 LF

## (undated) DEVICE — CATH EMERGE MR 20 X 2.00

## (undated) DEVICE — CATH IMPULSE 6FR MULTI-PAK